# Patient Record
Sex: FEMALE | Race: WHITE | NOT HISPANIC OR LATINO | Employment: FULL TIME | URBAN - METROPOLITAN AREA
[De-identification: names, ages, dates, MRNs, and addresses within clinical notes are randomized per-mention and may not be internally consistent; named-entity substitution may affect disease eponyms.]

---

## 2017-01-03 ENCOUNTER — ANESTHESIA EVENT (OUTPATIENT)
Dept: PERIOP | Facility: HOSPITAL | Age: 32
End: 2017-01-03

## 2017-01-04 ENCOUNTER — ANESTHESIA (OUTPATIENT)
Dept: PERIOP | Facility: HOSPITAL | Age: 32
End: 2017-01-04

## 2017-01-04 PROBLEM — R10.2 PELVIC PAIN: Status: ACTIVE | Noted: 2017-01-04

## 2017-01-04 PROCEDURE — 25010000002 ONDANSETRON PER 1 MG: Performed by: NURSE ANESTHETIST, CERTIFIED REGISTERED

## 2017-01-04 PROCEDURE — 25010000002 FENTANYL CITRATE (PF) 100 MCG/2ML SOLUTION: Performed by: NURSE ANESTHETIST, CERTIFIED REGISTERED

## 2017-01-04 PROCEDURE — 25010000003 CEFAZOLIN IN DEXTROSE 2-4 GM/100ML-% SOLUTION: Performed by: OBSTETRICS & GYNECOLOGY

## 2017-01-04 PROCEDURE — 25010000002 PROPOFOL 10 MG/ML EMULSION: Performed by: NURSE ANESTHETIST, CERTIFIED REGISTERED

## 2017-01-04 PROCEDURE — 25010000002 KETOROLAC TROMETHAMINE PER 15 MG: Performed by: NURSE ANESTHETIST, CERTIFIED REGISTERED

## 2017-01-04 PROCEDURE — 25010000002 NEOSTIGMINE PER 0.5 MG: Performed by: NURSE ANESTHETIST, CERTIFIED REGISTERED

## 2017-01-04 PROCEDURE — 25010000002 PROPOFOL 1000 MG/ML EMULSION: Performed by: NURSE ANESTHETIST, CERTIFIED REGISTERED

## 2017-01-04 PROCEDURE — 25010000002 MIDAZOLAM PER 1 MG: Performed by: NURSE ANESTHETIST, CERTIFIED REGISTERED

## 2017-01-04 PROCEDURE — 25010000002 DEXAMETHASONE PER 1 MG: Performed by: NURSE ANESTHETIST, CERTIFIED REGISTERED

## 2017-01-04 RX ORDER — FENTANYL CITRATE 50 UG/ML
INJECTION, SOLUTION INTRAMUSCULAR; INTRAVENOUS AS NEEDED
Status: DISCONTINUED | OUTPATIENT
Start: 2017-01-04 | End: 2017-01-04 | Stop reason: SURG

## 2017-01-04 RX ORDER — ONDANSETRON 2 MG/ML
INJECTION INTRAMUSCULAR; INTRAVENOUS AS NEEDED
Status: DISCONTINUED | OUTPATIENT
Start: 2017-01-04 | End: 2017-01-04 | Stop reason: SURG

## 2017-01-04 RX ORDER — GLYCOPYRROLATE 0.2 MG/ML
INJECTION INTRAMUSCULAR; INTRAVENOUS AS NEEDED
Status: DISCONTINUED | OUTPATIENT
Start: 2017-01-04 | End: 2017-01-04 | Stop reason: SURG

## 2017-01-04 RX ORDER — MIDAZOLAM HYDROCHLORIDE 1 MG/ML
INJECTION INTRAMUSCULAR; INTRAVENOUS AS NEEDED
Status: DISCONTINUED | OUTPATIENT
Start: 2017-01-04 | End: 2017-01-04 | Stop reason: SURG

## 2017-01-04 RX ORDER — LIDOCAINE HYDROCHLORIDE 10 MG/ML
INJECTION, SOLUTION INFILTRATION; PERINEURAL AS NEEDED
Status: DISCONTINUED | OUTPATIENT
Start: 2017-01-04 | End: 2017-01-04 | Stop reason: SURG

## 2017-01-04 RX ORDER — ATRACURIUM BESYLATE 10 MG/ML
INJECTION, SOLUTION INTRAVENOUS AS NEEDED
Status: DISCONTINUED | OUTPATIENT
Start: 2017-01-04 | End: 2017-01-04 | Stop reason: SURG

## 2017-01-04 RX ORDER — DEXAMETHASONE SODIUM PHOSPHATE 4 MG/ML
INJECTION, SOLUTION INTRA-ARTICULAR; INTRALESIONAL; INTRAMUSCULAR; INTRAVENOUS; SOFT TISSUE AS NEEDED
Status: DISCONTINUED | OUTPATIENT
Start: 2017-01-04 | End: 2017-01-04 | Stop reason: SURG

## 2017-01-04 RX ORDER — PROPOFOL 10 MG/ML
VIAL (ML) INTRAVENOUS AS NEEDED
Status: DISCONTINUED | OUTPATIENT
Start: 2017-01-04 | End: 2017-01-04 | Stop reason: SURG

## 2017-01-04 RX ORDER — KETOROLAC TROMETHAMINE 30 MG/ML
INJECTION, SOLUTION INTRAMUSCULAR; INTRAVENOUS AS NEEDED
Status: DISCONTINUED | OUTPATIENT
Start: 2017-01-04 | End: 2017-01-04 | Stop reason: SURG

## 2017-01-04 RX ADMIN — ONDANSETRON 4 MG: 2 INJECTION INTRAMUSCULAR; INTRAVENOUS at 11:07

## 2017-01-04 RX ADMIN — LIDOCAINE HYDROCHLORIDE 50 MG: 10 INJECTION, SOLUTION INFILTRATION; PERINEURAL at 10:37

## 2017-01-04 RX ADMIN — DEXAMETHASONE SODIUM PHOSPHATE 8 MG: 4 INJECTION, SOLUTION INTRAMUSCULAR; INTRAVENOUS at 10:42

## 2017-01-04 RX ADMIN — PROPOFOL 200 MG: 10 INJECTION, EMULSION INTRAVENOUS at 10:37

## 2017-01-04 RX ADMIN — MIDAZOLAM HYDROCHLORIDE 2 MG: 1 INJECTION, SOLUTION INTRAMUSCULAR; INTRAVENOUS at 10:32

## 2017-01-04 RX ADMIN — FENTANYL CITRATE 50 MCG: 50 INJECTION, SOLUTION INTRAMUSCULAR; INTRAVENOUS at 10:37

## 2017-01-04 RX ADMIN — ROBINUL 0.2 MG: 0.2 INJECTION INTRAMUSCULAR; INTRAVENOUS at 10:58

## 2017-01-04 RX ADMIN — ATRACURIUM BESYLATE 30 MG: 10 INJECTION, SOLUTION INTRAVENOUS at 10:37

## 2017-01-04 RX ADMIN — CEFAZOLIN SODIUM 2 G: 2 INJECTION, SOLUTION INTRAVENOUS at 10:42

## 2017-01-04 RX ADMIN — FENTANYL CITRATE 50 MCG: 50 INJECTION, SOLUTION INTRAMUSCULAR; INTRAVENOUS at 10:55

## 2017-01-04 RX ADMIN — KETOROLAC TROMETHAMINE 30 MG: 30 INJECTION, SOLUTION INTRAMUSCULAR at 11:09

## 2017-01-04 RX ADMIN — PROPOFOL 25 MCG/KG/MIN: 10 INJECTION, EMULSION INTRAVENOUS at 10:41

## 2017-01-04 RX ADMIN — ROBINUL 0.4 MG: 0.2 INJECTION INTRAMUSCULAR; INTRAVENOUS at 11:13

## 2017-01-04 RX ADMIN — Medication 3 MG: at 11:13

## 2017-01-04 NOTE — ANESTHESIA PREPROCEDURE EVALUATION
Anesthesia Evaluation      Airway   Mallampati: I  TM distance: <3 FB  Neck ROM: full  no difficulty expected  Dental - normal exam     Pulmonary - normal exam   Cardiovascular - normal exam    Neuro/Psych  GI/Hepatic/Renal/Endo      Musculoskeletal     Abdominal  - normal exam    Bowel sounds: normal.   Substance History      OB/GYN          Other                       Anesthesia Plan    ASA 2     general

## 2017-01-04 NOTE — ANESTHESIA PROCEDURE NOTES
Airway  Urgency: elective    Airway not difficult    General Information and Staff    Patient location during procedure: OR  Anesthesiologist: ДМИТРИЙ KEY  CRNA: ROSY OLMOS    Indications and Patient Condition  Indications for airway management: airway protection    Preoxygenated: yes  MILS not maintained throughout  Mask difficulty assessment: 1 - vent by mask    Final Airway Details  Final airway type: endotracheal airway      Successful airway: ETT  Cuffed: yes   Successful intubation technique: direct laryngoscopy  Facilitating devices/methods: intubating stylet  Endotracheal tube insertion site: oral  Blade: Sandra  Blade size: #3  ETT size: 7.0 mm  Cormack-Lehane Classification: grade I - full view of glottis  Placement verified by: chest auscultation and capnometry   Cuff volume (mL): 6  Measured from: lips  ETT to lips (cm): 21  Number of attempts at approach: 1    Additional Comments  Negative epigastric sounds, Breath sound equal bilaterally with symmetric chest rise and fall

## 2017-01-04 NOTE — ANESTHESIA POSTPROCEDURE EVALUATION
Patient: Andreina Segovia    Procedure Summary     Date Anesthesia Start Anesthesia Stop Room / Location    01/04/17 1031   [facility] / [facility]       Procedure Diagnosis Surgeon Provider    DIAGNOSTIC LAPAROSCOPY, LAP BILATERAL SALPINGECTOMY (N/A Abdomen) No diagnosis on file. MD Josue Hooks MD          Anesthesia Type: general  Last vitals  BP   130/79   Temp   97.8   Pulse  89   Resp   15   SpO2   97     Post Anesthesia Care and Evaluation    Patient location during evaluation: PACU  Patient participation: complete - patient participated  Level of consciousness: awake and alert  Pain management: adequate  Airway patency: patent  Anesthetic complications: no  Cardiovascular status: hemodynamically stable and acceptable  Respiratory status: nonlabored ventilation, acceptable and nasal cannula  Hydration status: acceptable

## 2017-01-15 ENCOUNTER — APPOINTMENT (OUTPATIENT)
Dept: CT IMAGING | Facility: HOSPITAL | Age: 32
End: 2017-01-15

## 2017-01-15 ENCOUNTER — HOSPITAL ENCOUNTER (EMERGENCY)
Facility: HOSPITAL | Age: 32
Discharge: HOME | End: 2017-01-15
Attending: EMERGENCY MEDICINE | Admitting: EMERGENCY MEDICINE

## 2017-01-15 ENCOUNTER — APPOINTMENT (OUTPATIENT)
Dept: GENERAL RADIOLOGY | Facility: HOSPITAL | Age: 32
End: 2017-01-15

## 2017-01-15 VITALS
OXYGEN SATURATION: 95 % | DIASTOLIC BLOOD PRESSURE: 103 MMHG | SYSTOLIC BLOOD PRESSURE: 149 MMHG | HEIGHT: 60 IN | HEART RATE: 85 BPM | RESPIRATION RATE: 18 BRPM | BODY MASS INDEX: 42.41 KG/M2 | WEIGHT: 216 LBS | TEMPERATURE: 98.5 F

## 2017-01-15 DIAGNOSIS — K44.9 HIATAL HERNIA: ICD-10-CM

## 2017-01-15 DIAGNOSIS — R06.00 DYSPNEA, UNSPECIFIED TYPE: ICD-10-CM

## 2017-01-15 DIAGNOSIS — R07.9 CHEST PAIN, UNSPECIFIED TYPE: Primary | ICD-10-CM

## 2017-01-15 LAB
ALBUMIN SERPL-MCNC: 4.3 G/DL (ref 3.2–4.8)
ALBUMIN/GLOB SERPL: 1.3 G/DL (ref 1.5–2.5)
ALP SERPL-CCNC: 114 U/L (ref 25–100)
ALT SERPL W P-5'-P-CCNC: 20 U/L (ref 7–40)
ANION GAP SERPL CALCULATED.3IONS-SCNC: 14 MMOL/L (ref 3–11)
AST SERPL-CCNC: 20 U/L (ref 0–33)
BASOPHILS # BLD AUTO: 0.02 10*3/MM3 (ref 0–0.2)
BASOPHILS NFR BLD AUTO: 0.2 % (ref 0–1)
BILIRUB SERPL-MCNC: 0.2 MG/DL (ref 0.3–1.2)
BUN BLD-MCNC: 9 MG/DL (ref 9–23)
BUN/CREAT SERPL: 12.9 (ref 7–25)
CALCIUM SPEC-SCNC: 10.1 MG/DL (ref 8.7–10.4)
CHLORIDE SERPL-SCNC: 107 MMOL/L (ref 99–109)
CO2 SERPL-SCNC: 25 MMOL/L (ref 20–31)
CREAT BLD-MCNC: 0.7 MG/DL (ref 0.6–1.3)
D DIMER PPP FEU-MCNC: 0.86 MG/L (FEU) (ref 0–0.5)
DEPRECATED RDW RBC AUTO: 44.5 FL (ref 37–54)
EOSINOPHIL # BLD AUTO: 0.16 10*3/MM3 (ref 0.1–0.3)
EOSINOPHIL NFR BLD AUTO: 1.9 % (ref 0–3)
ERYTHROCYTE [DISTWIDTH] IN BLOOD BY AUTOMATED COUNT: 13.5 % (ref 11.3–14.5)
GFR SERPL CREATININE-BSD FRML MDRD: 96 ML/MIN/1.73
GLOBULIN UR ELPH-MCNC: 3.4 GM/DL
GLUCOSE BLD-MCNC: 105 MG/DL (ref 70–100)
HCT VFR BLD AUTO: 38.8 % (ref 34.5–44)
HGB BLD-MCNC: 12.8 G/DL (ref 11.5–15.5)
HOLD SPECIMEN: NORMAL
IMM GRANULOCYTES # BLD: 0.03 10*3/MM3 (ref 0–0.03)
IMM GRANULOCYTES NFR BLD: 0.4 % (ref 0–0.6)
INR PPP: 0.92
LYMPHOCYTES # BLD AUTO: 1.98 10*3/MM3 (ref 0.6–4.8)
LYMPHOCYTES NFR BLD AUTO: 23.5 % (ref 24–44)
MCH RBC QN AUTO: 29.6 PG (ref 27–31)
MCHC RBC AUTO-ENTMCNC: 33 G/DL (ref 32–36)
MCV RBC AUTO: 89.6 FL (ref 80–99)
MONOCYTES # BLD AUTO: 0.39 10*3/MM3 (ref 0–1)
MONOCYTES NFR BLD AUTO: 4.6 % (ref 0–12)
NEUTROPHILS # BLD AUTO: 5.83 10*3/MM3 (ref 1.5–8.3)
NEUTROPHILS NFR BLD AUTO: 69.4 % (ref 41–71)
PLATELET # BLD AUTO: 247 10*3/MM3 (ref 150–450)
PMV BLD AUTO: 10.2 FL (ref 6–12)
POTASSIUM BLD-SCNC: 4.1 MMOL/L (ref 3.5–5.5)
PROT SERPL-MCNC: 7.7 G/DL (ref 5.7–8.2)
PROTHROMBIN TIME: 10 SECONDS (ref 9.6–11.5)
RBC # BLD AUTO: 4.33 10*6/MM3 (ref 3.89–5.14)
SODIUM BLD-SCNC: 146 MMOL/L (ref 132–146)
TROPONIN I SERPL-MCNC: 0.01 NG/ML (ref 0–0.07)
TROPONIN I SERPL-MCNC: <0.006 NG/ML
WBC NRBC COR # BLD: 8.41 10*3/MM3 (ref 3.5–10.8)
WHOLE BLOOD HOLD SPECIMEN: NORMAL
WHOLE BLOOD HOLD SPECIMEN: NORMAL

## 2017-01-15 PROCEDURE — 85025 COMPLETE CBC W/AUTO DIFF WBC: CPT | Performed by: PHYSICIAN ASSISTANT

## 2017-01-15 PROCEDURE — 25010000002 KETOROLAC TROMETHAMINE PER 15 MG: Performed by: PHYSICIAN ASSISTANT

## 2017-01-15 PROCEDURE — 85610 PROTHROMBIN TIME: CPT | Performed by: PHYSICIAN ASSISTANT

## 2017-01-15 PROCEDURE — 99284 EMERGENCY DEPT VISIT MOD MDM: CPT

## 2017-01-15 PROCEDURE — 84484 ASSAY OF TROPONIN QUANT: CPT

## 2017-01-15 PROCEDURE — 93005 ELECTROCARDIOGRAM TRACING: CPT | Performed by: EMERGENCY MEDICINE

## 2017-01-15 PROCEDURE — 85379 FIBRIN DEGRADATION QUANT: CPT | Performed by: PHYSICIAN ASSISTANT

## 2017-01-15 PROCEDURE — 71010 HC CHEST PA OR AP: CPT

## 2017-01-15 PROCEDURE — 93005 ELECTROCARDIOGRAM TRACING: CPT | Performed by: PHYSICIAN ASSISTANT

## 2017-01-15 PROCEDURE — 96374 THER/PROPH/DIAG INJ IV PUSH: CPT

## 2017-01-15 PROCEDURE — 84484 ASSAY OF TROPONIN QUANT: CPT | Performed by: EMERGENCY MEDICINE

## 2017-01-15 PROCEDURE — 80053 COMPREHEN METABOLIC PANEL: CPT | Performed by: PHYSICIAN ASSISTANT

## 2017-01-15 PROCEDURE — 0 IOPAMIDOL PER 1 ML: Performed by: EMERGENCY MEDICINE

## 2017-01-15 PROCEDURE — 71275 CT ANGIOGRAPHY CHEST: CPT

## 2017-01-15 RX ORDER — KETOROLAC TROMETHAMINE 30 MG/ML
30 INJECTION, SOLUTION INTRAMUSCULAR; INTRAVENOUS ONCE
Status: COMPLETED | OUTPATIENT
Start: 2017-01-15 | End: 2017-01-15

## 2017-01-15 RX ORDER — SODIUM CHLORIDE 0.9 % (FLUSH) 0.9 %
10 SYRINGE (ML) INJECTION AS NEEDED
Status: DISCONTINUED | OUTPATIENT
Start: 2017-01-15 | End: 2017-01-15 | Stop reason: HOSPADM

## 2017-01-15 RX ADMIN — IOPAMIDOL 60 ML: 755 INJECTION, SOLUTION INTRAVENOUS at 10:41

## 2017-01-15 RX ADMIN — KETOROLAC TROMETHAMINE 30 MG: 30 INJECTION, SOLUTION INTRAMUSCULAR at 12:01

## 2017-01-15 NOTE — ED PROVIDER NOTES
Subjective   Patient is a 31 y.o. female presenting with chest pain.   History provided by:  Patient  Chest Pain   Pain location:  R chest  Pain quality: pressure and sharp    Pain radiates to:  Does not radiate  Onset quality:  Sudden  Duration: About 4am today.  Context comment:  Pt had diagnostic laparoscopy and salpingectomy by Dr Allison 1-4-17. No complications since procedure.   Ineffective treatments: Motrin.  Associated symptoms: dizziness (Lightheaded ) and shortness of breath    Associated symptoms: no abdominal pain, no anxiety, no back pain, no cough, no dysphagia, no fever, no headache, no lower extremity edema, no nausea, no numbness, no palpitations, no syncope, no vomiting and no weakness    Risk factors: birth control (Discontinued 1-4-17) and surgery    Risk factors: no coronary artery disease, no diabetes mellitus, no high cholesterol, no hypertension, no immobilization, no prior DVT/PE and no smoking        Review of Systems   Constitutional: Negative for chills and fever.   HENT: Negative for congestion, ear pain, sore throat and trouble swallowing.    Eyes: Negative for pain, redness and visual disturbance.   Respiratory: Positive for shortness of breath. Negative for cough and chest tightness.    Cardiovascular: Positive for chest pain. Negative for palpitations, leg swelling and syncope.   Gastrointestinal: Negative for abdominal pain, constipation, diarrhea, nausea and vomiting.   Genitourinary: Negative for difficulty urinating, dysuria, flank pain, hematuria and vaginal bleeding.   Musculoskeletal: Negative for arthralgias, back pain and joint swelling.   Skin: Negative for rash and wound.   Neurological: Positive for dizziness (Lightheaded ). Negative for syncope, speech difficulty, weakness, numbness and headaches.   Psychiatric/Behavioral: Negative for confusion.   All other systems reviewed and are negative.      Past Medical History   Diagnosis Date   • Bronchitis    • Infectious  mononucleosis    • Ovarian cyst    • Wears glasses        No Known Allergies    Past Surgical History   Procedure Laterality Date   • [place] tooth extraction     • Colonoscopy     • Diagnostic laparoscopy N/A 1/4/2017     Procedure: DIAGNOSTIC LAPAROSCOPY, LAP BILATERAL SALPINGECTOMY;  Surgeon: Kaia Humphrey MD;  Location: [facility], 1928–1983;  Service:        Family History   Problem Relation Age of Onset   • Ulcerative colitis Mother    • Glaucoma Father        Social History     Social History   • Marital status:      Spouse name: N/A   • Number of children: N/A   • Years of education: N/A     Social History Main Topics   • Smoking status: Never Smoker   • Smokeless tobacco: Never Used   • Alcohol use Yes      Comment: occasional    • Drug use: No   • Sexual activity: No     Other Topics Concern   • None     Social History Narrative           Objective   Physical Exam   Constitutional: She is oriented to person, place, and time. Vital signs are normal. She appears well-developed.   HENT:   Head: Atraumatic.   Nose: Nose normal.   Mouth/Throat: Mucous membranes are normal.   Eyes: Conjunctivae, EOM and lids are normal. Pupils are equal, round, and reactive to light.   Neck: Normal range of motion. Neck supple.   Cardiovascular: Regular rhythm and normal heart sounds.  Tachycardia present.    Pulmonary/Chest: Effort normal and breath sounds normal. She has no wheezes.   Abdominal: Soft. She exhibits no distension. There is no tenderness. There is no rebound and no guarding.   Well healing surgical scars. No erythema or drainage.    Musculoskeletal: Normal range of motion. She exhibits no edema or tenderness.   Neurological: She is alert and oriented to person, place, and time. No sensory deficit.   Skin: Skin is warm and dry. No rash noted. No erythema.   Psychiatric: She has a normal mood and affect. Her speech is normal and behavior is normal.   Nursing note and vitals reviewed.      Procedures         ED  Course  ED Course   Comment By Time   RE-examined multiple times in ED. Pt initially declined pain meds but then agreeable for Toradol. Discussed results and close f/u with PCP / Dr Humphrey. She will return to ED if new or worse sx.  MONTSE Brewster 01/15 1153        Course of Care      Lab Results (last 24 hours)     Procedure Component Value Units Date/Time    CBC & Differential [[id number]] Collected:  01/15/17 0913    Specimen:  Blood Updated:  01/15/17 0932    Narrative:       The following orders were created for panel order CBC & Differential.  Procedure                               Abnormality         Status                     ---------                               -----------         ------                     CBC Auto Differential[[id number]]         Abnormal            Final result                 Please view results for these tests on the individual orders.    Comprehensive Metabolic Panel [[id number]]  (Abnormal) Collected:  01/15/17 0913    Specimen:  Blood Updated:  01/15/17 0949     Glucose 105 (H) mg/dL      BUN 9 mg/dL      Creatinine 0.70 mg/dL      Sodium 146 mmol/L      Potassium 4.1 mmol/L      Chloride 107 mmol/L      CO2 25.0 mmol/L      Calcium 10.1 mg/dL      Total Protein 7.7 g/dL      Albumin 4.30 g/dL      ALT (SGPT) 20 U/L      AST (SGOT) 20 U/L      Alkaline Phosphatase 114 (H) U/L      Total Bilirubin 0.2 (L) mg/dL      eGFR Non African Amer 96 mL/min/1.73      Globulin 3.4 gm/dL      A/G Ratio 1.3 (L) g/dL      BUN/Creatinine Ratio 12.9      Anion Gap 14.0 (H) mmol/L     Narrative:       [employer] Guidelines    Stage                           Description                             GFR                      1                               Normal or High                          90+  2                               Mild decrease                            60-89  3                               Moderate decrease                   30-59  4                                Severe decrease                       15-29  5                               Kidney failure                             <15    Protime-INR [[id number]] Collected:  01/15/17 0913    Specimen:  Blood Updated:  01/15/17 0950     Protime 10.0 Seconds      INR 0.92     Narrative:       Therapeutic Ranges for INR: 2.0-3.0 (PT 20-30)                              2.5-3.5 (PT 25-34)    D-dimer, Quantitative [[id number]]  (Abnormal) Collected:  01/15/17 0913    Specimen:  Blood Updated:  01/15/17 0950     D-Dimer, Quantitative 0.86 (H) mg/L (FEU)     Narrative:       Negative predictive value for exclusion of venous thromboembolism: < or = 0.5 mg/L (FEU)    CBC Auto Differential [[id number]]  (Abnormal) Collected:  01/15/17 0913    Specimen:  Blood Updated:  01/15/17 0932     WBC 8.41 10*3/mm3      RBC 4.33 10*6/mm3      Hemoglobin 12.8 g/dL      Hematocrit 38.8 %      MCV 89.6 fL      MCH 29.6 pg      MCHC 33.0 g/dL      RDW 13.5 %      RDW-SD 44.5 fl      MPV 10.2 fL      Platelets 247 10*3/mm3      Neutrophil % 69.4 %      Lymphocyte % 23.5 (L) %      Monocyte % 4.6 %      Eosinophil % 1.9 %      Basophil % 0.2 %      Immature Grans % 0.4 %      Neutrophils, Absolute 5.83 10*3/mm3      Lymphocytes, Absolute 1.98 10*3/mm3      Monocytes, Absolute 0.39 10*3/mm3      Eosinophils, Absolute 0.16 10*3/mm3      Basophils, Absolute 0.02 10*3/mm3      Immature Grans, Absolute 0.03 10*3/mm3     Troponin [[id number]]  (Normal) Collected:  01/15/17 0913    Specimen:  Blood Updated:  01/15/17 1149     Troponin I <0.006 ng/mL     Narrative:       Ultra Troponin I Reference Range:         <=0.039 ng/mL: Negative    0.04-0.779 ng/mL: Indeterminate Range. Suspicious of MI.  Clinical correlation required.       >=0.78  ng/mL: Consistent with myocardial injury.  Clinical correlation required.    POC Troponin, Rapid [[id number]]  (Normal) Collected:  01/15/17 1122    Specimen:  Blood Updated:  01/15/17 1140     Troponin I 0.01 ng/mL       Serial  "Number: [id number]    : [id number]             Note: In addition to lab results from this visit, the labs listed above may include labs taken at another facility or during a different encounter within the last 24 hours. Please correlate lab times with ED admission and discharge times for further clarification of the services performed during this visit.    CT Angiogram Chest With Contrast   ED Interpretation   No PE, hiatal hernia otherwise NAD per RAD voice comment      XR Chest 1 View    (Results Pending)       Vitals:    01/15/17 0812 01/15/17 0814 01/15/17 0830 01/15/17 1100   BP:  157/93 131/87 (!) 149/103   BP Location:  Right arm     Patient Position:  Sitting     Pulse:  104 87 85   Resp:  18     Temp:  98.5 °F (36.9 °C)     TempSrc:  Oral     SpO2:  96% 95% 95%   Weight: 216 lb (98 kg)      Height: 60\" (152.4 cm)          Medications   sodium chloride 0.9 % flush 10 mL (not administered)   ketorolac (TORADOL) injection 30 mg (not administered)   iopamidol (ISOVUE-370) 76 % injection 60 mL (60 mL Intravenous Given 1/15/17 1041)       ECG/EMG Results (last 24 hours)     Procedure Component Value Units Date/Time    ECG 12 Lead [[id number]] Collected:  01/15/17 0816     Updated:  01/15/17 0839    ECG 12 Lead [[id number]] Collected:  01/15/17 1125     Updated:  01/15/17 1138                    MDM    Final diagnoses:   Chest pain, unspecified type   Dyspnea, unspecified type   Hiatal hernia            MONTSE Brewster  01/15/17 1810    "

## 2017-05-17 ENCOUNTER — OFFICE VISIT (OUTPATIENT)
Dept: ORTHOPEDIC SURGERY | Facility: CLINIC | Age: 32
End: 2017-05-17

## 2017-05-17 DIAGNOSIS — R52 PAIN: Primary | ICD-10-CM

## 2017-05-17 DIAGNOSIS — M79.672 FOOT PAIN, LEFT: ICD-10-CM

## 2017-05-17 DIAGNOSIS — M77.42 METATARSALGIA OF LEFT FOOT: ICD-10-CM

## 2017-05-17 PROCEDURE — 99213 OFFICE O/P EST LOW 20 MIN: CPT | Performed by: ORTHOPAEDIC SURGERY

## 2017-10-31 ENCOUNTER — TRANSCRIBE ORDERS (OUTPATIENT)
Dept: PHYSICAL THERAPY | Facility: HOSPITAL | Age: 32
End: 2017-10-31

## 2017-10-31 DIAGNOSIS — S49.91XA INJURY OF RIGHT SHOULDER, INITIAL ENCOUNTER: Primary | ICD-10-CM

## 2017-11-06 ENCOUNTER — HOSPITAL ENCOUNTER (OUTPATIENT)
Dept: PHYSICAL THERAPY | Facility: HOSPITAL | Age: 32
Setting detail: THERAPIES SERIES
Discharge: HOME | End: 2017-11-06

## 2017-11-06 DIAGNOSIS — S49.91XA INJURY OF RIGHT SHOULDER, INITIAL ENCOUNTER: Primary | ICD-10-CM

## 2017-11-06 PROCEDURE — 97161 PT EVAL LOW COMPLEX 20 MIN: CPT | Performed by: PHYSICAL THERAPIST

## 2017-11-06 NOTE — THERAPY EVALUATION
Outpatient Physical Therapy Ortho Initial Evaluation  [facility]     Patient Name: Andreina Segovia  : 1985  MRN: [id number]  Today's Date: 2017      Visit Date: 2017    Patient Active Problem List   Diagnosis   • Pelvic pain   • Metatarsalgia of left foot        Past Medical History:   Diagnosis Date   • Bronchitis    • Infectious mononucleosis    • Ovarian cyst    • Wears glasses         Past Surgical History:   Procedure Laterality Date   • COLONOSCOPY     • DIAGNOSTIC LAPAROSCOPY N/A 2017    Procedure: DIAGNOSTIC LAPAROSCOPY, LAP BILATERAL SALPINGECTOMY;  Surgeon: Kaia Humphrey MD;  Location: [facility];  Service:    • WISDOM TOOTH EXTRACTION         Visit Dx:     ICD-10-CM ICD-9-CM   1. Injury of right shoulder, initial encounter S49.91XA 959.2             Patient History       17 1400          History    Chief Complaint Difficulty with daily activities;Pain;Muscle weakness  -RB      Type of Pain Upper Extremity / Arm;Shoulder pain  -RB      Brief Description of Current Complaint Received a flu shot 3 weeks ago, was sore for a day or so.  By the end of the week was unable to raise her arm,  objects, has numbness/tingling in the pinky finger and ulnar side of the hand. Symptoms initially constant, have since become more intermittent. Pn initially located over the lateral upper arm, and radiated up into her neck and the shoulder blade, but recently has involved primarily the upper arm. Unable to raise her arm over shoulder height, don/doff tighter fitting shirts, wash her hair,  objects, open screw top lids, or perform repetitive activity w/ the R arm. Has been taking prednisone, ibuprofen. Has had no imaging.   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 Hobbies include knitting, running. She cares for her 3 children  -RB      Patient seeing anyone else for problem(s)? Yes  -RB      How has patient tried to help current problem? use of ice, heat, rest, medication  -RB      What clinical tests have you had for this problem? --   None  -RB      Pain     Pain Location Arm  -RB      Pain at Present 3  -RB      Pain at Best 3  -RB      Pain at Worst 7  -RB      Pain Frequency Constant/continuous  -RB      Pain Description Aching;Dull  -RB      What Performance Factors Make the Current Problem(s) WORSE? raising the arm, repetitive activity  -RB      What Performance Factors Make the Current Problem(s) BETTER? use of ibuprofen  -RB      Is your sleep disturbed? Yes   2/2 soreness w/ direct pressure  -RB      Fall Risk Assessment    Any falls in the past year: No  -RB      Daily Activities    Primary Language English  -RB      Are you able to read Yes  -RB      Are you able to write Yes  -RB      How does patient learn best? Demonstration  -RB      Teaching needs identified Home Exercise Program;Management of Condition  -RB      Patient is concerned about/has problems with Difficulty with self care (i.e. bathing, dressing, toileting:;Performing home management (household chores, shopping, care of dependents);Performing job responsibilities/community activities (work, school,;Reaching over head;Repetitive movements of the hand, arm, shoulder  -RB      Does patient have problems with the following? None  -RB      Barriers to learning None  -RB      Pt Participated in POC and Goals Yes  -RB      Safety    Are you being hurt, hit, or frightened by anyone at home or in your life? No  -RB      Are you being neglected by a caregiver No  -RB        User Key  (r) = Recorded By, (t) = Taken By, (c) =  Cosigned By    Initials Name Provider Type    RB Collette Crespo, PT Physical Therapist                PT Ortho       11/06/17 1400    Subjective Comments    Subjective Comments Presents w/ complaint of acute right shoulder pn after receiving flu shot 3 weeks ago.  -RB    Posture/Observations    Posture- WNL Posture is WNL  -RB    Quarter Clearing    Quarter Clearing Upper Quarter Clearing  -RB    Neural Tension Signs- Upper Quarter Clearing    ULNTT 1 Negative  -RB    ULNTT 2 Negative  -RB    ULNTT 3 Negative  -RB    ULNTT 4 Negative  -RB    Sensory Screen for Light Touch- Upper Quarter Clearing    C4 (posterior shoulder) Intact  -RB    C5 (lateral upper arm) Intact  -RB    C6 (tip of thumb) Intact  -RB    C7 (tip of 3rd finger) Intact  -RB    C8 (tip of 5th finger) Intact  -RB    T1 (medial lower arm) Intact  -RB    Cervical/Shoulder ROM Screen    Cervical flexion Normal  -RB    Cervical extension Normal  -RB    Cervical lateral flexion Normal  -RB    Cervical rotation Normal  -RB    Cervical quadrant (Spurling's) Normal  -RB    Special Tests/Palpation    Special Tests/Palpation Shoulder  -RB    Shoulder Girdle Palpation    Subacromial Space Right:;Tender  -RB    Long Head of Biceps Right:;Tender  -RB    Deltoid Right:;Tender   ant/middle portions  -RB    Shoulder Girdle Palpation? Yes  -RB    Shoulder Girdle Accessory Motions    Shoulder Girdle Accessory Motions Tested? Yes   intact and pn free  -RB    Shoulder Impingement/Rotator Cuff Special Tests    Ordoñez-Theron Test (RC Lesion vs. Bursitis) Negative  -RB    Neer Impingement Test (RC Lesion vs. Bursitis) Negative  -RB    Empty Can Test (RC Lesion) Right:;Positive   weak and painful  -RB    Belly Press (Subscapularis Lesion) Negative  -RB    Speed's Test (LH of Biceps Lesion) Right:;Positive   pn over bicep tendon  -RB    Yergason Test (LH of Biceps Lesion) Right:;Positive  -RB    Biceps/Labral Special Tests    Sumter's Test (Labral Test) Negative  -RB     ROM (Range of Motion)    General ROM upper extremity range of motion deficits identified  -RB    Right Shoulder    Flexion AROM Deficit 143   limited by tightness on top of shoulder  -RB    Flexion PROM Deficit 180    pn anterior shoulder at end range  -RB    ABduction AROM Deficit 93  -RB    ABduction PROM Deficit 170   pn at end range  -RB    External Rotation AROM Deficit 56  -RB    External Rotation PROM Deficit 70  -RB    Internal Rotation AROM Deficit to mid back, limited by pn/tightness upper arm  -RB    Internal Rotation PROM Deficit WFL  -RB    General UE Assessment    ROM shoulder, right: UE ROM deficit  -RB    MMT (Manual Muscle Testing)    General MMT Assessment upper extremity strength deficits identified  -RB    Right Shoulder    Flexion Gross Movement (4-/5) good minus  -RB    ABduction Gross Movement (4-/5) good minus   significant pn  -RB    Int Rotation Gross Movement (4+/5) good plus  -RB    Ext Rotation Gross Movement (4/5) good  -RB    Upper Extremity    Upper Ext Manual Muscle Testing right shoulder strength deficit;right elbow/forearm strength deficit  -RB    Right Elbow/Forearm    Elbow Flexion Gross Movement (4+/5) good plus   mild pn  -RB    Elbow Extension Gross Movement (5/5) normal  -RB      User Key  (r) = Recorded By, (t) = Taken By, (c) = Cosigned By    Initials Name Provider Type    COLETTE Crespo PT Physical Therapist                            Therapy Education       11/06/17 1527          Therapy Education    Education Details Issued hands behind head ER stretch, bicep stretch, and wall slides  -RB      Given HEP  -RB      Program New  -RB      How Provided Verbal;Demonstration;Written  -RB      Provided to Patient  -RB      Level of Understanding Teach back education performed  -RB        User Key  (r) = Recorded By, (t) = Taken By, (c) = Cosigned By    Initials Name Provider Type    COLETTE Crespo PT Physical Therapist                PT OP Goals       11/06/17 1500        PT Short Term Goals    STG Date to Achieve 11/27/17  -RB     STG 1 Pt to be compliant w/ initial HEP for flexibility and strengthening  -RB     STG 1 Progress New  -RB     STG 2 Pt to demo full AROM of the R shoulder w/ minimal to no pn.  -RB     STG 2 Progress New  -RB     STG 3 Pt to report pn w/ daily activities no greater than 4/10.  -RB     STG 3 Progress New  -RB     Long Term Goals    LTG Date to Achieve 12/18/17  -RB     LTG 1 Pt to be independent w/ long term HEP for strengthening, flexibility, symptom mgmt  -RB     LTG 1 Progress New  -RB     LTG 2 Pt to improve R shoulder/elbow strength to at least 4+/5 in all planes w/ minimal to no pn upon testing.  -RB     LTG 2 Progress New  -RB     LTG 3 Pt to improve QD score by at least 20% to reflect improved pn and function.  -RB     LTG 3 Progress New  -RB     Time Calculation    PT Goal Re-Cert Due Date 02/04/18  -RB       User Key  (r) = Recorded By, (t) = Taken By, (c) = Cosigned By    Initials Name Provider Type    RB Collette Crespo, PT Physical Therapist                PT Assessment/Plan       11/06/17 1530       PT Assessment    Functional Limitations Limitations in functional capacity and performance;Performance in self-care ADL;Limitation in home management;Performance in work activities  -RB     Impairments Muscle strength;Pain;Range of motion;Impaired flexibility  -RB     Assessment Comments Pt presents with complaint of acute R upper extremity pn, numbness/tingling along ulnar hand and 5th digit. Findings indicative of R bicep strain. She demonstrates deficits in R shoulder AROM and strength, limiting her ability to perform daily and work activities including pushing/pulling, lifting and carrying objects, opening lids. Cervical compression/distraction and ULTT unremarkable. Pt would benefit from skilled PT services to address deficits, decrease pn, and allow return to PLOF.  -RB     Please refer to paper survey for additional self-reported information  Yes  -RB     Rehab Potential Good  -RB     Patient/caregiver participated in establishment of treatment plan and goals Yes  -RB     Patient would benefit from skilled therapy intervention Yes  -RB     PT Plan    PT Frequency 1x/week  -RB     Predicted Duration of Therapy Intervention (days/wks) 6 wks  -RB     Planned CPT's? PT EVAL LOW COMPLEXITY: 52970;PT RE-EVAL: 56717;PT THER PROC EA 15 MIN: 36415;PT MANUAL THERAPY EA 15 MIN: 19253;PT NEUROMUSC RE-EDUCATION EA 15 MIN: 97648;PT ELECTRICAL STIM UNATTEND: ;PT HOT/COLD PACK WC NONMCARE: 87872;PT ULTRASOUND EA 15 MIN: 96603;PT IONTOPHORESIS EA 15 MIN: 38899  -RB     PT Plan Comments PT POC to include progressive strengthening and flexibility program, manual therapy techniques, modalities as indicated for pn control.  -RB       User Key  (r) = Recorded By, (t) = Taken By, (c) = Cosigned By    Initials Name Provider Type    COLETTE Crespo, PT Physical Therapist                  Exercises       11/06/17 1400          Subjective Comments    Subjective Comments Presents w/ complaint of acute right shoulder pn after receiving flu shot 3 weeks ago.  -RB        User Key  (r) = Recorded By, (t) = Taken By, (c) = Cosigned By    Initials Name Provider Type    COLETTE Crespo, PT Physical Therapist                              Outcome Measures       11/06/17 1500          Quick DASH    Open a tight or new jar. 4  -RB      Do heavy household chores (e.g., wash walls, wash floors) 4  -RB      Carry a shopping bag or briefcase 3  -RB      Wash your back 4  -RB      Use a knife to cut food 1  -RB      Recreational activities in which you take some force or impact through your arm, should or hand (e.g. golf, hammering, tennis, etc.) 5  -RB      During the past week, to what extent has your arm, shoulder, or hand problem interfered with your normal social activites with family, friends, neighbors or groups? 4  -RB      During the past week, were you limited in your work or  other regular daily activities as a result of your arm, shoulder or hand problem? 4  -RB      Arm, Shoulder, or hand pain 3  -RB      Tingling (pins and needles) in your arm, shoulder, or hand 3  -RB      During the past week, how much difficulty have you had sleeping because of the pain in your arm, shoulder or hand? 2  -RB      Number of Questions Answered 11  -RB      Quick DASH Score 59.09  -RB      Functional Assessment    Outcome Measure Options Quick DASH  -RB        User Key  (r) = Recorded By, (t) = Taken By, (c) = Cosigned By    Initials Name Provider Type    RB Collette Crespo, PT Physical Therapist            Time Calculation:   Start Time: 1430     Therapy Charges for Today     Code Description Service Date Service Provider Modifiers Qty    62005413464 HC PT EVAL LOW COMPLEXITY 3 11/6/2017 Collette Crespo, PT GP 1          PT G-Sophia  Outcome Measure Options: Quick DASH         Collette Crespo, PT  11/6/2017

## 2017-11-14 ENCOUNTER — HOSPITAL ENCOUNTER (OUTPATIENT)
Dept: PHYSICAL THERAPY | Facility: HOSPITAL | Age: 32
Setting detail: THERAPIES SERIES
Discharge: HOME | End: 2017-11-14

## 2017-11-14 DIAGNOSIS — S49.91XA INJURY OF RIGHT SHOULDER, INITIAL ENCOUNTER: Primary | ICD-10-CM

## 2017-11-14 PROCEDURE — 97140 MANUAL THERAPY 1/> REGIONS: CPT | Performed by: PHYSICAL THERAPIST

## 2017-11-14 PROCEDURE — 97110 THERAPEUTIC EXERCISES: CPT | Performed by: PHYSICAL THERAPIST

## 2017-11-14 PROCEDURE — 97035 APP MDLTY 1+ULTRASOUND EA 15: CPT | Performed by: PHYSICAL THERAPIST

## 2017-11-14 NOTE — THERAPY TREATMENT NOTE
Outpatient Physical Therapy Ortho Treatment Note  [facility]     Patient Name: Andreina Segovia  : 1985  MRN: [id number]  Today's Date: 2017      Visit Date: 2017    Visit Dx:    ICD-10-CM ICD-9-CM   1. Injury of right shoulder, initial encounter S49.91XA 959.2       Patient Active Problem List   Diagnosis   • Pelvic pain   • Metatarsalgia of left foot        Past Medical History:   Diagnosis Date   • Bronchitis    • Infectious mononucleosis    • Ovarian cyst    • Wears glasses         Past Surgical History:   Procedure Laterality Date   • COLONOSCOPY     • DIAGNOSTIC LAPAROSCOPY N/A 2017    Procedure: DIAGNOSTIC LAPAROSCOPY, LAP BILATERAL SALPINGECTOMY;  Surgeon: Kaia Humphrey MD;  Location: [facility];  Service:    • WISDOM TOOTH EXTRACTION               PT Ortho       17 1600    Subjective Comments    Subjective Comments Pn in the upper arm is about the same, however tingling in hand/digits much improved.  -RB    Subjective Pain    Able to rate subjective pain? yes  -RB    Pre-Treatment Pain Level 3  -RB    Post-Treatment Pain Level 4  -RB      User Key  (r) = Recorded By, (t) = Taken By, (c) = Cosigned By    Initials Name Provider Type    COLETTE Crespo, PT Physical Therapist                            PT Assessment/Plan       17 1632       PT Assessment    Assessment Comments Signs/symptoms appear more indicative of bursitis vs. bicep involvement. Pt tolerated initial ther ex fairly well, though has significant pn w/ elevation over shoulder height.  -RB     PT Plan    PT Plan Comments Cont per POC-progress strengthening as tolerated.  -RB       User Key  (r) = Recorded By, (t) = Taken By, (c) = Cosigned By    Initials Name Provider Type    COLETTE Crespo, PT Physical Therapist                Modalities       17 1600          Ultrasound 70703    Location R shoulder-anterior  -RB      Rx Minutes 8  -RB      Duty Cycle 50  -RB      Frequency 1.0 MHz   -RB      Intensity - Wts/cm 1  -RB        User Key  (r) = Recorded By, (t) = Taken By, (c) = Cosigned By    Initials Name Provider Type    COLETTE Crespo, PT Physical Therapist                Exercises       11/14/17 1600          Subjective Comments    Subjective Comments Pn in the upper arm is about the same, however tingling in hand/digits much improved.  -RB      Subjective Pain    Able to rate subjective pain? yes  -RB      Pre-Treatment Pain Level 3  -RB      Post-Treatment Pain Level 4  -RB      Exercise 1    Exercise Name 1 Initiated ther ex in clinic as per flow sheet w/ focus on UE flexiblity and scapular stabilization.  -RB      Time (Minutes) 1 20  -RB        User Key  (r) = Recorded By, (t) = Taken By, (c) = Cosigned By    Initials Name Provider Type    COLETTE Crespo, PT Physical Therapist                        Manual Rx (last 36 hours)      Manual Treatments       11/14/17 1500          Manual Rx 1    Manual Rx 1 Location R shoulder  -RB      Manual Rx 1 Type supine GH mobilization, post/inf; distraction w/ oscillation  -RB      Manual Rx 1 Grade GrII  -RB      Manual Rx 1 Duration 10 min  -RB        User Key  (r) = Recorded By, (t) = Taken By, (c) = Cosigned By    Initials Name Provider Type    COLETTE Crespo, PT Physical Therapist                PT OP Goals       11/14/17 1633       Time Calculation    PT Goal Re-Cert Due Date 02/04/18  -RB       User Key  (r) = Recorded By, (t) = Taken By, (c) = Cosigned By    Initials Name Provider Type    COLETTE Crespo, PT Physical Therapist                Therapy Education       11/14/17 1632          Therapy Education    Education Details updated HEP to include SL ER, SL abd, scaption, supine scap depression  -RB      Given HEP  -RB      Program New  -RB      How Provided Verbal;Demonstration;Written  -RB      Provided to Patient  -RB      Level of Understanding Teach back education performed  -RB        User Key  (r) = Recorded By, (t) = Taken  By, (c) = Cosigned By    Initials Name Provider Type    RB Collette Crespo, PT Physical Therapist                Time Calculation:   Start Time: 1310  Total Timed Code Minutes- PT: 30 minute(s)    Therapy Charges for Today     Code Description Service Date Service Provider Modifiers Qty    85505865326 HC PT THER PROC EA 15 MIN 11/14/2017 Collette Crespo, PT GP 1    62257257043 HC PT MANUAL THERAPY EA 15 MIN 11/14/2017 Collette Crespo, PT GP 1    51507085978 HC PT ULTRASOUND EA 15 MIN 11/14/2017 Collette Crespo, PT GP 1                    Collette Crespo, PT  11/14/2017

## 2017-11-20 ENCOUNTER — HOSPITAL ENCOUNTER (OUTPATIENT)
Dept: PHYSICAL THERAPY | Facility: HOSPITAL | Age: 32
Setting detail: THERAPIES SERIES
Discharge: HOME | End: 2017-11-20

## 2017-11-20 DIAGNOSIS — S49.91XA INJURY OF RIGHT SHOULDER, INITIAL ENCOUNTER: Primary | ICD-10-CM

## 2017-11-20 PROCEDURE — 97110 THERAPEUTIC EXERCISES: CPT | Performed by: PHYSICAL THERAPIST

## 2017-11-20 PROCEDURE — 97140 MANUAL THERAPY 1/> REGIONS: CPT | Performed by: PHYSICAL THERAPIST

## 2017-11-20 PROCEDURE — 97035 APP MDLTY 1+ULTRASOUND EA 15: CPT | Performed by: PHYSICAL THERAPIST

## 2017-11-20 NOTE — THERAPY TREATMENT NOTE
Outpatient Physical Therapy Ortho Treatment Note  [facility]     Patient Name: Andreina Segovia  : 1985  MRN: [id number]  Today's Date: 2017      Visit Date: 2017    Visit Dx:    ICD-10-CM ICD-9-CM   1. Injury of right shoulder, initial encounter S49.91XA 959.2       Patient Active Problem List   Diagnosis   • Pelvic pain   • Metatarsalgia of left foot        Past Medical History:   Diagnosis Date   • Bronchitis    • Infectious mononucleosis    • Ovarian cyst    • Wears glasses         Past Surgical History:   Procedure Laterality Date   • COLONOSCOPY     • DIAGNOSTIC LAPAROSCOPY N/A 2017    Procedure: DIAGNOSTIC LAPAROSCOPY, LAP BILATERAL SALPINGECTOMY;  Surgeon: Kaia Humphrey MD;  Location: [facility];  Service:    • WISDOM TOOTH EXTRACTION               PT Ortho       17 1000    Subjective Comments    Subjective Comments Has no issues after her last PT visit. However last Friday had significant anterior shoulder pn after a busy day w/ work and school activities.  -RB    Subjective Pain    Able to rate subjective pain? yes  -RB    Pre-Treatment Pain Level 4  -RB    Post-Treatment Pain Level 5  -RB    Right Shoulder    Flexion AROM Deficit 135  -RB    ABduction AROM Deficit 122  -RB    External Rotation AROM Deficit 68  -RB    Internal Rotation AROM Deficit thumb to T10  -RB    Right Shoulder    Flexion Gross Movement (4+/5) good plus  -RB    ABduction Gross Movement (4/5) good   pn  -RB    Int Rotation Gross Movement (4+/5) good plus  -RB    Ext Rotation Gross Movement (4+/5) good plus  -RB    Right Elbow/Forearm    Elbow Flexion Gross Movement (5/5) normal  -RB    Elbow Extension Gross Movement (5/5) normal  -RB      User Key  (r) = Recorded By, (t) = Taken By, (c) = Cosigned By    Initials Name Provider Type    RB Collette Crespo, PT Physical Therapist                            PT Assessment/Plan       17 1100       PT Assessment    Assessment Comments Pn more  localized to anterior shoulder. Pt reports tenderness to palpation of the anterior subacromial region that is more pronounced w/ shoulder extended, indicating subacromial bursa involvement. She reported slight decrease in pn w/ application of MWM throughout scaption.  -RB     PT Plan    PT Plan Comments Incorporate ionto w/ dex if MD agreeable  -RB       User Key  (r) = Recorded By, (t) = Taken By, (c) = Cosigned By    Initials Name Provider Type    COLETTE Crespo, PT Physical Therapist                Modalities       11/20/17 1000          Ultrasound 76148    Location R shoulder-anterior  -RB      Rx Minutes 8  -RB      Duty Cycle 50  -RB      Frequency 1.0 MHz  -RB      Intensity - Wts/cm 1  -RB        User Key  (r) = Recorded By, (t) = Taken By, (c) = Cosigned By    Initials Name Provider Type    COLETTE Crespo, PT Physical Therapist                Exercises       11/20/17 1000          Subjective Comments    Subjective Comments Has no issues after her last PT visit. However last Friday had significant anterior shoulder pn after a busy day w/ work and school activities.  -RB      Subjective Pain    Able to rate subjective pain? yes  -RB      Pre-Treatment Pain Level 4  -RB      Post-Treatment Pain Level 5  -RB      Exercise 1    Exercise Name 1 Continued ther ex in clinic as per flow sheet. Issued MD progress note.  -RB      Time (Minutes) 1 20  -RB        User Key  (r) = Recorded By, (t) = Taken By, (c) = Cosigned By    Initials Name Provider Type    COLETTE Crespo, PT Physical Therapist                        Manual Rx (last 36 hours)      Manual Treatments       11/20/17 1100          Manual Rx 1    Manual Rx 1 Location R shoulder  -RB      Manual Rx 1 Type supine GH mobilization, post/inf; distraction w/ oscillation  -RB      Manual Rx 1 Grade GrII  -RB      Manual Rx 1 Duration 7 min  -RB      Manual Rx 2    Manual Rx 2 Location R shoulder  -RB      Manual Rx 2 Type Seated inferior/posterior humeral  glide MWM w/ scaption  -RB      Manual Rx 2 Duration 8 reps  -RB        User Key  (r) = Recorded By, (t) = Taken By, (c) = Cosigned By    Initials Name Provider Type    RB Collette Crespo, PT Physical Therapist                PT OP Goals       11/20/17 1102       Time Calculation    PT Goal Re-Cert Due Date 02/04/18  -RB       User Key  (r) = Recorded By, (t) = Taken By, (c) = Cosigned By    Initials Name Provider Type    COLETTE Crespo PT Physical Therapist                    Time Calculation:   Start Time: 0945  Total Timed Code Minutes- PT: 30 minute(s)    Therapy Charges for Today     Code Description Service Date Service Provider Modifiers Qty    90982851106 HC PT MANUAL THERAPY EA 15 MIN 11/20/2017 Collette Crespo, PT GP 1    91505723541 HC PT THER PROC EA 15 MIN 11/20/2017 Collette Crespo, PT GP 1    76376145196 HC PT ULTRASOUND EA 15 MIN 11/20/2017 Collette Crespo, PT GP 1                    Collette Crespo, PT  11/20/2017

## 2017-11-28 ENCOUNTER — HOSPITAL ENCOUNTER (OUTPATIENT)
Dept: PHYSICAL THERAPY | Facility: HOSPITAL | Age: 32
Setting detail: THERAPIES SERIES
Discharge: HOME | End: 2017-11-28

## 2017-11-28 DIAGNOSIS — S49.91XA INJURY OF RIGHT SHOULDER, INITIAL ENCOUNTER: Primary | ICD-10-CM

## 2017-11-28 PROCEDURE — 97110 THERAPEUTIC EXERCISES: CPT | Performed by: PHYSICAL THERAPIST

## 2017-11-28 PROCEDURE — 97035 APP MDLTY 1+ULTRASOUND EA 15: CPT | Performed by: PHYSICAL THERAPIST

## 2017-11-28 NOTE — THERAPY TREATMENT NOTE
Outpatient Physical Therapy Ortho Treatment Note  [facility]     Patient Name: Andreina Segovia  : 1985  MRN: [id number]  Today's Date: 2017      Visit Date: 2017    Visit Dx:    ICD-10-CM ICD-9-CM   1. Injury of right shoulder, initial encounter S49.91XA 959.2       Patient Active Problem List   Diagnosis   • Pelvic pain   • Metatarsalgia of left foot        Past Medical History:   Diagnosis Date   • Bronchitis    • Infectious mononucleosis    • Ovarian cyst    • Wears glasses         Past Surgical History:   Procedure Laterality Date   • COLONOSCOPY     • DIAGNOSTIC LAPAROSCOPY N/A 2017    Procedure: DIAGNOSTIC LAPAROSCOPY, LAP BILATERAL SALPINGECTOMY;  Surgeon: Kaia Humphrey MD;  Location: [facility];  Service:    • WISDOM TOOTH EXTRACTION               PT Ortho       17 1000    Subjective Comments    Subjective Comments Has pn when reaching behind her such as when reaching for items in the backseat of her car.  -RB    Subjective Pain    Able to rate subjective pain? yes  -RB    Pre-Treatment Pain Level 4  -RB    Post-Treatment Pain Level 3  -RB      User Key  (r) = Recorded By, (t) = Taken By, (c) = Cosigned By    Initials Name Provider Type    COLETTE Crespo, PT Physical Therapist                            PT Assessment/Plan       17 1019       PT Assessment    Assessment Comments Pt tolerated progressed ther ex w/o complaint of pn. Initiated ionto take home patch w/ dexamethasone today, and issued updated HEP.  -RB     PT Plan    PT Plan Comments Assess resposne to ionto w/ dex, continue to progress as tolerated.  -RB       User Key  (r) = Recorded By, (t) = Taken By, (c) = Cosigned By    Initials Name Provider Type    COLETTE Crespo, PT Physical Therapist                Modalities       17 1000          Ultrasound [id number]    Location R shoulder-anterior  -RB      Rx Minutes 8  -RB      Duty Cycle 50  -RB      Frequency 1.0 MHz  -RB      Intensity -  Wts/cm 1  -RB      Iontophoresis 62949    Dexamethasone used Yes  -RB      Patch Type --   activa take home patch anterior subacromial region  -RB        User Key  (r) = Recorded By, (t) = Taken By, (c) = Cosigned By    Initials Name Provider Type    COLETTE Crespo, PT Physical Therapist                Exercises       11/28/17 1000          Subjective Comments    Subjective Comments Has pn when reaching behind her such as when reaching for items in the backseat of her car.  -RB      Subjective Pain    Able to rate subjective pain? yes  -RB      Pre-Treatment Pain Level 4  -RB      Post-Treatment Pain Level 3  -RB      Exercise 1    Exercise Name 1 Continued ther ex in clinic as per flow sheet. Progressed to include prone row, prone HAbdER @ 100 deg abd, progressed shoulder ER and shoulder depression to include resistance w/ RTB.  -RB      Time (Minutes) 1 20  -RB        User Key  (r) = Recorded By, (t) = Taken By, (c) = Cosigned By    Initials Name Provider Type    COLETTE Crespo, PT Physical Therapist                               PT OP Goals       11/28/17 1020       Time Calculation    PT Goal Re-Cert Due Date 02/04/18  -RB       User Key  (r) = Recorded By, (t) = Taken By, (c) = Cosigned By    Initials Name Provider Type    COLETTE Crepso PT Physical Therapist                Therapy Education       11/28/17 1018          Therapy Education    Education Details educated on use of ionto patch w/ dexamethasone and instructed to remove if any adverse effects noted. Issued updated HEP to include progressed ther ex as outlined in chart.  -RB      Given HEP;Other (comment)  -RB      Program Progressed  -RB      How Provided Verbal;Demonstration;Written  -RB      Provided to Patient  -RB      Level of Understanding Teach back education performed  -RB        User Key  (r) = Recorded By, (t) = Taken By, (c) = Cosigned By    Initials Name Provider Type    COLETTE Crespo, PT Physical Therapist                 Time Calculation:   Start Time: 0845  Total Timed Code Minutes- PT: 30 minute(s)    Therapy Charges for Today     Code Description Service Date Service Provider Modifiers Qty    18298799621 HC PT THER PROC EA 15 MIN 11/28/2017 Collette Crespo, PT GP 1    76111415018 HC PT ULTRASOUND EA 15 MIN 11/28/2017 Collette Crespo, PT GP 1                    Collette Crespo, PT  11/28/2017

## 2017-12-05 ENCOUNTER — HOSPITAL ENCOUNTER (OUTPATIENT)
Dept: PHYSICAL THERAPY | Facility: HOSPITAL | Age: 32
Setting detail: THERAPIES SERIES
Discharge: HOME | End: 2017-12-05

## 2017-12-05 DIAGNOSIS — S49.91XA INJURY OF RIGHT SHOULDER, INITIAL ENCOUNTER: Primary | ICD-10-CM

## 2017-12-05 PROCEDURE — 97110 THERAPEUTIC EXERCISES: CPT | Performed by: PHYSICAL THERAPIST

## 2017-12-05 NOTE — THERAPY PROGRESS REPORT/RE-CERT
"    Outpatient Physical Therapy Ortho Re-Assessment  [facility]     Patient Name: Andreina Segovia  : 1985  MRN: [id number]  Today's Date: 2017      Visit Date: 2017    Patient Active Problem List   Diagnosis   • Pelvic pain   • Metatarsalgia of left foot        Past Medical History:   Diagnosis Date   • Bronchitis    • Infectious mononucleosis    • Ovarian cyst    • Wears glasses         Past Surgical History:   Procedure Laterality Date   • COLONOSCOPY     • DIAGNOSTIC LAPAROSCOPY N/A 2017    Procedure: DIAGNOSTIC LAPAROSCOPY, LAP BILATERAL SALPINGECTOMY;  Surgeon: Kaia Humphrey MD;  Location: [facility];  Service:    • WISDOM TOOTH EXTRACTION         Visit Dx:     ICD-10-CM ICD-9-CM   1. Injury of right shoulder, initial encounter S49.91XA 959.2                 PT Ortho       17 1100    Subjective Comments    Subjective Comments Pn has been much improved over the past several days, even through her work shifts. Able to lift her arm further overhead w/ little pn.  -RB    Subjective Pain    Able to rate subjective pain? yes  -RB    Pre-Treatment Pain Level 1  -RB    Post-Treatment Pain Level 1  -RB    Shoulder Girdle Palpation    Subacromial Space Right:;Tender  -RB    Shoulder Impingement/Rotator Cuff Special Tests    Ordoñez-Theron Test (RC Lesion vs. Bursitis) Negative  -RB    Neer Impingement Test (RC Lesion vs. Bursitis) Negative  -RB    Empty Can Test (RC Lesion) Negative  -RB    Speed's Test (LH of Biceps Lesion) Negative  -RB    Yergason Test (LH of Biceps Lesion) Negative  -RB    Right Shoulder    Flexion AROM Deficit 160   \"soreness\" ant shoulder at end range  -RB    Flexion PROM Deficit 180  -RB    ABduction AROM Deficit 130  -RB    ABduction PROM Deficit 180  -RB    External Rotation AROM Deficit 75, functionally to C7   \"soreness\" ant shoulder at end range  -RB    External Rotation PROM Deficit 80  -RB    Internal Rotation AROM Deficit thumb to T10  -RB    " Internal Rotation PROM Deficit WFL  -RB    Right Shoulder    Flexion Gross Movement (4+/5) good plus  -RB    ABduction Gross Movement (4+/5) good plus  -RB    Int Rotation Gross Movement (5/5) normal  -RB    Ext Rotation Gross Movement (5/5) normal  -RB    Right Elbow/Forearm    Elbow Flexion Gross Movement (5/5) normal  -RB    Elbow Extension Gross Movement (5/5) normal  -RB      User Key  (r) = Recorded By, (t) = Taken By, (c) = Cosigned By    Initials Name Provider Type    RB Collette Crespo, PT Physical Therapist                                PT OP Goals       12/05/17 1142 12/05/17 1100    PT Short Term Goals    STG Date to Achieve  11/27/17  -RB    STG 1  Pt to be compliant w/ initial HEP for flexibility and strengthening  -RB    STG 1 Progress  Met  -RB    STG 2  Pt to demo full AROM of the R shoulder w/ minimal to no pn.  -RB    STG 2 Progress  Partially Met  -RB    STG 3  Pt to report pn w/ daily activities no greater than 4/10.  -RB    STG 3 Progress  Met  -RB    Long Term Goals    LTG Date to Achieve  12/18/17  -RB    LTG 1  Pt to be independent w/ long term HEP for strengthening, flexibility, symptom mgmt  -RB    LTG 1 Progress  Ongoing  -RB    LTG 2  Pt to improve R shoulder/elbow strength to at least 4+/5 in all planes w/ minimal to no pn upon testing.  -RB    LTG 2 Progress  Met  -RB    LTG 3  Pt to improve QD score by at least 20% to reflect improved pn and function.  -RB    LTG 3 Progress  Met  -RB    Time Calculation    PT Goal Re-Cert Due Date 02/04/18  -RB 02/04/18  -RB      User Key  (r) = Recorded By, (t) = Taken By, (c) = Cosigned By    Initials Name Provider Type    RB Collette Crespo, PT Physical Therapist                PT Assessment/Plan       12/05/17 1138       PT Assessment    Functional Limitations Limitations in functional capacity and performance  -RB     Impairments Pain;Range of motion  -RB     Assessment Comments Pt progressing well w/ PT.  She is responding well to use of  Iontophoresis w/ dexamethasone applied over the anterior subacromial region. She subjectively reports significant reduction in pn, improved function and demonstrates improved strength and ROM of the R shoulder. She continues to lack full AROM and reports mild restrictions in daily function due to continued pn. She would benefit from continued PT services to restore full strength and mobility and allow return to PLOF.  -RB     Please refer to paper survey for additional self-reported information Yes  -RB     Rehab Potential Good  -RB     Patient/caregiver participated in establishment of treatment plan and goals Yes  -RB     Patient would benefit from skilled therapy intervention Yes  -RB     PT Plan    PT Frequency 1x/week  -RB     Predicted Duration of Therapy Intervention (days/wks) 3 visits  -RB     Planned CPT's? PT RE-EVAL: 76553;PT THER PROC EA 15 MIN: 10875;PT MANUAL THERAPY EA 15 MIN: 73658;PT IONTOPHORESIS EA 15 MIN: 72251;PT ULTRASOUND EA 15 MIN: 70758;PT HOT/COLD PACK WC NONMCARE: 94977  -RB     PT Plan Comments May d/c soon if pn and ROM continue to improve. Plan to continue 2-4 additional visits to restore full strength and mobility.  -RB       User Key  (r) = Recorded By, (t) = Taken By, (c) = Cosigned By    Initials Name Provider Type    COLETTE Crespo, PT Physical Therapist                Modalities       12/05/17 1100          Iontophoresis 66456    Milliamps 40  -RB      MA/Min 4  -RB      Dexamethasone used Yes  -RB      Patch Type --   activa take home patch anterior subacromial region  -RB        User Key  (r) = Recorded By, (t) = Taken By, (c) = Cosigned By    Initials Name Provider Type    COLETTE Crespo, PT Physical Therapist              Exercises       12/05/17 1100          Subjective Comments    Subjective Comments Pn has been much improved over the past several days, even through her work shifts. Able to lift her arm further overhead w/ little pn.  -RB      Subjective Pain    Able  to rate subjective pain? yes  -RB      Pre-Treatment Pain Level 1  -RB      Post-Treatment Pain Level 1  -RB      Exercise 1    Exercise Name 1 Performed reassessment followed by gentle ROM/mobility activities.  -RB      Time (Minutes) 1 25  -RB        User Key  (r) = Recorded By, (t) = Taken By, (c) = Cosigned By    Initials Name Provider Type    COLETTE Crespo PT Physical Therapist                              Outcome Measures       12/05/17 1100          Quick DASH    Open a tight or new jar. 2  -RB      Do heavy household chores (e.g., wash walls, wash floors) 2  -RB      Carry a shopping bag or briefcase 3  -RB      Wash your back 3  -RB      Use a knife to cut food 2  -RB      Recreational activities in which you take some force or impact through your arm, should or hand (e.g. golf, hammering, tennis, etc.) 3  -RB      During the past week, to what extent has your arm, shoulder, or hand problem interfered with your normal social activites with family, friends, neighbors or groups? 1  -RB      During the past week, were you limited in your work or other regular daily activities as a result of your arm, shoulder or hand problem? 3  -RB      Arm, Shoulder, or hand pain 2  -RB      Tingling (pins and needles) in your arm, shoulder, or hand 2  -RB      During the past week, how much difficulty have you had sleeping because of the pain in your arm, shoulder or hand? 3  -RB      Number of Questions Answered 11  -RB      Quick DASH Score 34.09  -RB      Functional Assessment    Outcome Measure Options Quick DASH  -RB        User Key  (r) = Recorded By, (t) = Taken By, (c) = Cosigned By    Initials Name Provider Type    COLETTE Crespo PT Physical Therapist            Time Calculation:   Start Time: 1100  Total Timed Code Minutes- PT: 25 minute(s)     Therapy Charges for Today     Code Description Service Date Service Provider Modifiers Qty    70985986711  PT THER PROC EA 15 MIN 12/5/2017 Collette Crespo, PT GP  2          PT G-Codes  Outcome Measure Options: Quick AURORA Crespo, PT  12/5/2017

## 2017-12-12 ENCOUNTER — HOSPITAL ENCOUNTER (OUTPATIENT)
Dept: PHYSICAL THERAPY | Facility: HOSPITAL | Age: 32
Setting detail: THERAPIES SERIES
Discharge: HOME | End: 2017-12-12

## 2017-12-12 DIAGNOSIS — S49.91XA INJURY OF RIGHT SHOULDER, INITIAL ENCOUNTER: Primary | ICD-10-CM

## 2017-12-12 PROCEDURE — 97110 THERAPEUTIC EXERCISES: CPT | Performed by: PHYSICAL THERAPIST

## 2017-12-12 NOTE — THERAPY TREATMENT NOTE
Outpatient Physical Therapy Ortho Treatment Note  [facility]     Patient Name: Andreina Segovia  : 1985  MRN: [id number]  Today's Date: 2017      Visit Date: 2017    Visit Dx:    ICD-10-CM ICD-9-CM   1. Injury of right shoulder, initial encounter S49.91XA 959.2       Patient Active Problem List   Diagnosis   • Pelvic pain   • Metatarsalgia of left foot        Past Medical History:   Diagnosis Date   • Bronchitis    • Infectious mononucleosis    • Ovarian cyst    • Wears glasses         Past Surgical History:   Procedure Laterality Date   • COLONOSCOPY     • DIAGNOSTIC LAPAROSCOPY N/A 2017    Procedure: DIAGNOSTIC LAPAROSCOPY, LAP BILATERAL SALPINGECTOMY;  Surgeon: Kaia Humphrey MD;  Location: [facility];  Service:    • WISDOM TOOTH EXTRACTION               PT Ortho       17 1000    Subjective Comments    Subjective Comments Pn had been improved, but then was exacerbated after a busy weekend at work.  -RB    Subjective Pain    Able to rate subjective pain? yes  -RB    Pre-Treatment Pain Level 3  -RB    Post-Treatment Pain Level 3  -RB    Right Shoulder    ABduction AROM Deficit 145  -RB      User Key  (r) = Recorded By, (t) = Taken By, (c) = Cosigned By    Initials Name Provider Type    RB Collette Crespo, PT Physical Therapist                            PT Assessment/Plan       17 1036       PT Assessment    Assessment Comments Pt presents w/ increased pn after busy weeked at work. Tx program currently focused on rotator cuff strengthening in attempt to decompress subacromial space/bursa and pn control. Pt tolerated ther ex progressions w/o increased pn.  -RB     PT Plan    PT Plan Comments Pt follows up w/ MD in 1 wk-will continue as MD recommends.  -RB       User Key  (r) = Recorded By, (t) = Taken By, (c) = Cosigned By    Initials Name Provider Type    COLETTE Crespo, PT Physical Therapist                Modalities       17 1000          Iontophoresis  23246    Milliamps 40  -RB      MA/Min 4  -RB      Dexamethasone used Yes  -RB      Patch Type --   activa take home patch anterior subacromial region  -RB        User Key  (r) = Recorded By, (t) = Taken By, (c) = Cosigned By    Initials Name Provider Type    COLETTE Crespo, PT Physical Therapist                Exercises       12/12/17 1000          Subjective Comments    Subjective Comments Pn had been improved, but then was exacerbated after a busy weekend at work.  -RB      Subjective Pain    Able to rate subjective pain? yes  -RB      Pre-Treatment Pain Level 3  -RB      Post-Treatment Pain Level 3  -RB      Exercise 1    Exercise Name 1 Continued ther ex as per flow sheet w/ continued focus on rotator cuff strengthening, OH mobility activities. Issued MD progress report  -RB      Time (Minutes) 1 30  -RB        User Key  (r) = Recorded By, (t) = Taken By, (c) = Cosigned By    Initials Name Provider Type    COLETTE Crespo, PT Physical Therapist                               PT OP Goals       12/12/17 1037       Time Calculation    PT Goal Re-Cert Due Date 02/04/18  -RB       User Key  (r) = Recorded By, (t) = Taken By, (c) = Cosigned By    Initials Name Provider Type    COLETTE Crespo, PT Physical Therapist                         Time Calculation:   Start Time: 1000  Total Timed Code Minutes- PT: 30 minute(s)    Therapy Charges for Today     Code Description Service Date Service Provider Modifiers Qty    70774843119  PT THER PROC EA 15 MIN 12/12/2017 Collette Crespo, PT GP 2                    Collette Crespo, PT  12/12/2017

## 2017-12-22 ENCOUNTER — HOSPITAL ENCOUNTER (OUTPATIENT)
Dept: PHYSICAL THERAPY | Facility: HOSPITAL | Age: 32
Setting detail: THERAPIES SERIES
Discharge: HOME | End: 2017-12-22

## 2017-12-22 DIAGNOSIS — S49.91XA INJURY OF RIGHT SHOULDER, INITIAL ENCOUNTER: Primary | ICD-10-CM

## 2017-12-22 PROCEDURE — 97110 THERAPEUTIC EXERCISES: CPT | Performed by: PHYSICAL THERAPIST

## 2017-12-22 NOTE — THERAPY TREATMENT NOTE
Outpatient Physical Therapy Ortho Treatment Note  [facility]     Patient Name: Andreina Segovia  : 1985  MRN: [id number]  Today's Date: 2017      Visit Date: 2017    Visit Dx:    ICD-10-CM ICD-9-CM   1. Injury of right shoulder, initial encounter S49.91XA 959.2       Patient Active Problem List   Diagnosis   • Pelvic pain   • Metatarsalgia of left foot        Past Medical History:   Diagnosis Date   • Bronchitis    • Infectious mononucleosis    • Ovarian cyst    • Wears glasses         Past Surgical History:   Procedure Laterality Date   • COLONOSCOPY     • DIAGNOSTIC LAPAROSCOPY N/A 2017    Procedure: DIAGNOSTIC LAPAROSCOPY, LAP BILATERAL SALPINGECTOMY;  Surgeon: Kaia Humphrey MD;  Location: [facility];  Service:    • WISDOM TOOTH EXTRACTION               PT Ortho       17 1000    Subjective Comments    Subjective Comments Pn about the same. At rest doesn't really bother her, but comes on spontaneously. Saw MD who is referring her to Ortho.  -RB    Subjective Pain    Able to rate subjective pain? yes  -RB    Pre-Treatment Pain Level 0  -RB    Post-Treatment Pain Level 0  -RB      User Key  (r) = Recorded By, (t) = Taken By, (c) = Cosigned By    Initials Name Provider Type    COLETTE Crespo, PT Physical Therapist                            PT Assessment/Plan       17 1035       PT Assessment    Assessment Comments Pn relatively unchanged since previous visit.  Pt tolerated increased resistance w/ ther ex w/o increased pn. ROM improving.  -RB     PT Plan    PT Plan Comments Ortho consult pending. Continue w/ 2 remaining approved visits w/ focus on rotator cuff strengthening, joint mobility, ROM.  -RB       User Key  (r) = Recorded By, (t) = Taken By, (c) = Cosigned By    Initials Name Provider Type    COLETTE Crespo, PT Physical Therapist                Modalities       17 1000          Iontophoresis 85766    Milliamps 40  -RB      MA/Min 4  -RB       Dexamethasone used Yes  -RB      Patch Type --   activa take home patch anterior subacromial region  -RB        User Key  (r) = Recorded By, (t) = Taken By, (c) = Cosigned By    Initials Name Provider Type    COLETTE Crespo PT Physical Therapist                Exercises       12/22/17 1000          Subjective Comments    Subjective Comments Pn about the same. At rest doesn't really bother her, but comes on spontaneously. Saw MD who is referring her to Ortho.  -RB      Subjective Pain    Able to rate subjective pain? yes  -RB      Pre-Treatment Pain Level 0  -RB      Post-Treatment Pain Level 0  -RB      Exercise 1    Exercise Name 1 Continued with ther ex in clinic as per flow sheet. Added weight to prone horizontal abd/ER, prone row, and added prone shoulder extn w/ weight.  -RB      Time (Minutes) 1 35  -RB        User Key  (r) = Recorded By, (t) = Taken By, (c) = Cosigned By    Initials Name Provider Type    COLETTE Crespo PT Physical Therapist                               PT OP Goals       12/22/17 1037       Time Calculation    PT Goal Re-Cert Due Date 02/04/18  -RB       User Key  (r) = Recorded By, (t) = Taken By, (c) = Cosigned By    Initials Name Provider Type    COLETTE Crespo, PT Physical Therapist                         Time Calculation:   Start Time: 0945  Total Timed Code Minutes- PT: 35 minute(s)    Therapy Charges for Today     Code Description Service Date Service Provider Modifiers Qty    55220693668  PT THER PROC EA 15 MIN 12/22/2017 Collette Crespo, PT GP 2                    Collette Crespo, PT  12/22/2017

## 2017-12-27 ENCOUNTER — HOSPITAL ENCOUNTER (OUTPATIENT)
Dept: PHYSICAL THERAPY | Facility: HOSPITAL | Age: 32
Setting detail: THERAPIES SERIES
Discharge: HOME | End: 2017-12-27

## 2017-12-27 DIAGNOSIS — S49.91XA INJURY OF RIGHT SHOULDER, INITIAL ENCOUNTER: Primary | ICD-10-CM

## 2017-12-27 PROCEDURE — 97110 THERAPEUTIC EXERCISES: CPT | Performed by: PHYSICAL THERAPIST

## 2017-12-27 NOTE — THERAPY TREATMENT NOTE
Outpatient Physical Therapy Ortho Treatment Note  [facility]     Patient Name: Andreina Segovia  : 1985  MRN: [id number]  Today's Date: 2017      Visit Date: 2017    Visit Dx:    ICD-10-CM ICD-9-CM   1. Injury of right shoulder, initial encounter S49.91XA 959.2       Patient Active Problem List   Diagnosis   • Pelvic pain   • Metatarsalgia of left foot        Past Medical History:   Diagnosis Date   • Bronchitis    • Infectious mononucleosis    • Ovarian cyst    • Wears glasses         Past Surgical History:   Procedure Laterality Date   • COLONOSCOPY     • DIAGNOSTIC LAPAROSCOPY N/A 2017    Procedure: DIAGNOSTIC LAPAROSCOPY, LAP BILATERAL SALPINGECTOMY;  Surgeon: Kaia Humphrey MD;  Location: [facility];  Service:    • WISDOM TOOTH EXTRACTION               PT Ortho       17 1200    Subjective Comments    Subjective Comments Pt reports that pn is a little improved. She has been able to sleep while laying on her R shoulder.  -RB    Subjective Pain    Able to rate subjective pain? yes  -RB    Pre-Treatment Pain Level 2  -RB    Post-Treatment Pain Level 2  -RB      User Key  (r) = Recorded By, (t) = Taken By, (c) = Cosigned By    Initials Name Provider Type    COLETTE Crespo, PT Physical Therapist                            PT Assessment/Plan       17 1253       PT Assessment    Assessment Comments Pn improved. Pt tolerated progressed resistance well, no complaint of increased pn. Pt has 1 approved visit remaining  -RB     PT Plan    PT Plan Comments Cont w/ last approved visit, request more. Still awaiting ortho consult  -RB       User Key  (r) = Recorded By, (t) = Taken By, (c) = Cosigned By    Initials Name Provider Type    COLETTE Crespo, PT Physical Therapist                Modalities       17 1200          Iontophoresis 29011    Milliamps 40  -RB      MA/Min 4  -RB      Dexamethasone used Yes  -RB      Patch Type --   activa take home patch anterior  subacromial region  -RB        User Key  (r) = Recorded By, (t) = Taken By, (c) = Cosigned By    Initials Name Provider Type    COLETTE Crespo, PT Physical Therapist                Exercises       12/27/17 1200          Subjective Comments    Subjective Comments Pt reports that pn is a little improved. She has been able to sleep while laying on her R shoulder.  -RB      Subjective Pain    Able to rate subjective pain? yes  -RB      Pre-Treatment Pain Level 2  -RB      Post-Treatment Pain Level 2  -RB      Exercise 1    Exercise Name 1 Continued ther ex as per flow sheet. Increased weight to 3 lbs and added robbery w/ YTB.  -RB      Time (Minutes) 1 30  -RB        User Key  (r) = Recorded By, (t) = Taken By, (c) = Cosigned By    Initials Name Provider Type    COLETTE Crespo PT Physical Therapist                               PT OP Goals       12/27/17 1254       Time Calculation    PT Goal Re-Cert Due Date 02/04/18  -RB       User Key  (r) = Recorded By, (t) = Taken By, (c) = Cosigned By    Initials Name Provider Type    COLETTE Crespo PT Physical Therapist                         Time Calculation:   Start Time: 0930  Total Timed Code Minutes- PT: 30 minute(s)    Therapy Charges for Today     Code Description Service Date Service Provider Modifiers Qty    72291466995 HC PT THER PROC EA 15 MIN 12/27/2017 Collette Crespo, PT GP 2                    Collette Crespo, PT  12/27/2017

## 2018-01-02 ENCOUNTER — HOSPITAL ENCOUNTER (OUTPATIENT)
Dept: PHYSICAL THERAPY | Facility: HOSPITAL | Age: 33
Setting detail: THERAPIES SERIES
Discharge: HOME | End: 2018-01-02

## 2018-01-02 DIAGNOSIS — S49.91XA INJURY OF RIGHT SHOULDER, INITIAL ENCOUNTER: Primary | ICD-10-CM

## 2018-01-02 PROCEDURE — 97110 THERAPEUTIC EXERCISES: CPT | Performed by: PHYSICAL THERAPIST

## 2018-01-02 NOTE — THERAPY TREATMENT NOTE
Outpatient Physical Therapy Ortho Treatment Note   [place]     Patient Name: Adnreina Segovia  : 1985  MRN: [id number]  Today's Date: 2018      Visit Date: 2018    Visit Dx:    ICD-10-CM ICD-9-CM   1. Injury of right shoulder, initial encounter S49.91XA 959.2       Patient Active Problem List   Diagnosis   • Pelvic pain   • Metatarsalgia of left foot        Past Medical History:   Diagnosis Date   • Bronchitis    • Infectious mononucleosis    • Ovarian cyst    • Wears glasses         Past Surgical History:   Procedure Laterality Date   • COLONOSCOPY     • DIAGNOSTIC LAPAROSCOPY N/A 2017    Procedure: DIAGNOSTIC LAPAROSCOPY, LAP BILATERAL SALPINGECTOMY;  Surgeon: Kaia Humphrey MD;  Location: [facility];  Service:    • WISDOM TOOTH EXTRACTION               PT Ortho       18 1300    Subjective Comments    Subjective Comments Has had only 1 episode of increased pn over the past several days, occurred while sitting at rest during work. Otherwise reports a soreness in the front of the shoulder w/ movement that limits her ability to reach overhead, behind her back, lift/carry, etc.  -RB    Subjective Pain    Able to rate subjective pain? yes  -RB    Pre-Treatment Pain Level 2  -RB    Post-Treatment Pain Level 2  -RB    Shoulder Girdle Palpation    Subacromial Space Right:;Tender  -RB    Shoulder Impingement/Rotator Cuff Special Tests    Ordoñez-Theron Test (RC Lesion vs. Bursitis) Negative  -RB    Neer Impingement Test (RC Lesion vs. Bursitis) Negative  -RB    Empty Can Test (RC Lesion) Negative  -RB    Speed's Test (LH of Biceps Lesion) Negative  -RB    Yergason Test (LH of Biceps Lesion) Negative  -RB    Right Shoulder    Flexion AROM Deficit 165  -RB    Flexion PROM Deficit 180  -RB    ABduction AROM Deficit 145  -RB    ABduction PROM Deficit 180  -RB    External Rotation AROM Deficit 80  -RB    External Rotation PROM Deficit 80  -RB    Internal Rotation AROM Deficit thumb  to T 10  -RB    Right Shoulder    Flexion Gross Movement (4+/5) good plus  -RB    ABduction Gross Movement (4+/5) good plus  -RB    Int Rotation Gross Movement (5/5) normal  -RB    Ext Rotation Gross Movement (5/5) normal  -RB    Right Elbow/Forearm    Elbow Flexion Gross Movement (5/5) normal  -RB    Elbow Extension Gross Movement (5/5) normal  -RB      User Key  (r) = Recorded By, (t) = Taken By, (c) = Cosigned By    Initials Name Provider Type    COLETTE Crespo, PT Physical Therapist                            PT Assessment/Plan       01/02/18 1410       PT Assessment    Functional Limitations Limitations in functional capacity and performance  -RB     Impairments Pain;Range of motion  -RB     Assessment Comments Pt reports slow but gradual improvement in pn and functional ability. She continues to demonstrates deficits in OH and behind back mobility 2/2 pn over anterior subacromial space, where she remains tender to palpation. Pt still awaiting approval for Ortho consult. She may benefit from continued skilled PT services to further progress strength, ROM and decrease pn.  -RB     Please refer to paper survey for additional self-reported information Yes  -RB     Rehab Potential Good  -RB     Patient/caregiver participated in establishment of treatment plan and goals Yes  -RB     Patient would benefit from skilled therapy intervention Yes  -RB     PT Plan    PT Frequency 1x/week  -RB     Predicted Duration of Therapy Intervention (days/wks) 4-6 visits  -RB     Planned CPT's? PT RE-EVAL: 98158;PT THER PROC EA 15 MIN: 19808;PT MANUAL THERAPY EA 15 MIN: 52866;PT IONTOPHORESIS EA 15 MIN: 64918;PT HOT/COLD PACK WC NONMCARE: 96885  -RB     PT Plan Comments Submit for additional visits. Awaiting ortho consult. Cont PT POC if approved to maximize strength and mobility.  -RB       User Key  (r) = Recorded By, (t) = Taken By, (c) = Cosigned By    Initials Name Provider Type    COLETTE Crespo, PT Physical Therapist                 Modalities       01/02/18 1300          Iontophoresis 31743    Milliamps 40  -RB      MA/Min 4  -RB      Dexamethasone used Yes  -RB      Patch Type --   activa take home patch anterior subacromial region  -RB        User Key  (r) = Recorded By, (t) = Taken By, (c) = Cosigned By    Initials Name Provider Type    COLETTE Crespo, PT Physical Therapist                Exercises       01/02/18 1300          Subjective Comments    Subjective Comments Has had only 1 episode of increased pn over the past several days, occurred while sitting at rest during work. Otherwise reports a soreness in the front of the shoulder w/ movement that limits her ability to reach overhead, behind her back, lift/carry, etc.  -RB      Subjective Pain    Able to rate subjective pain? yes  -RB      Pre-Treatment Pain Level 2  -RB      Post-Treatment Pain Level 2  -RB      Exercise 1    Exercise Name 1 Performed reassessment then continued w/ ther ex in clinic as per flow sheet.  -RB      Time (Minutes) 1 30  -RB        User Key  (r) = Recorded By, (t) = Taken By, (c) = Cosigned By    Initials Name Provider Type    COLETTE Crespo, PT Physical Therapist                               PT OP Goals       01/02/18 1412 01/02/18 1400    PT Short Term Goals    STG Date to Achieve  11/27/17  -RB    STG 1  Pt to be compliant w/ initial HEP for flexibility and strengthening  -RB    STG 1 Progress  Met  -RB    STG 2  Pt to demo full AROM of the R shoulder w/ minimal to no pn.  -RB    STG 2 Progress  Partially Met  -RB    STG 3  Pt to report pn w/ daily activities no greater than 4/10.  -RB    STG 3 Progress  Met  -RB    Long Term Goals    LTG Date to Achieve  12/18/17  -RB    LTG 1  Pt to be independent w/ long term HEP for strengthening, flexibility, symptom mgmt  -RB    LTG 1 Progress  Ongoing  -RB    LTG 2  Pt to improve R shoulder/elbow strength to at least 4+/5 in all planes w/ minimal to no pn upon testing.  -RB    LTG 2 Progress  Met   -RB    LTG 3  Pt to improve QD score by at least 20% to reflect improved pn and function.  -RB    LTG 3 Progress  Met  -RB    Time Calculation    PT Goal Re-Cert Due Date 02/04/18  -RB 02/04/18  -RB      User Key  (r) = Recorded By, (t) = Taken By, (c) = Cosigned By    Initials Name Provider Type    RB Collette Crespo, PT Physical Therapist               Outcome Measure Options: Quick DASH  Quick DASH  Open a tight or new jar.: Mild Difficulty  Do heavy household chores (e.g., wash walls, wash floors): Mild Difficulty  Carry a shopping bag or briefcase: Mild Difficulty  Wash your back: Moderate Difficulty  Use a knife to cut food: No Difficulty  Recreational activities in which you take some force or impact through your arm, should or hand (e.g. golf, hammering, tennis, etc.): Moderate Difficulty  During the past week, to what extent has your arm, shoulder, or hand problem interfered with your normal social activites with family, friends, neighbors or groups?: Slightly  During the past week, were you limited in your work or other regular daily activities as a result of your arm, shoulder or hand problem?: Slightly Limited  Arm, Shoulder, or hand pain: Moderate  Tingling (pins and needles) in your arm, shoulder, or hand: Mild  During the past week, how much difficulty have you had sleeping because of the pain in your arm, shoulder or hand?: Mild Difficulty  Number of Questions Answered: 11  Quick DASH Score: 29.55         Time Calculation:   Start Time: 1330  Total Timed Code Minutes- PT: 30 minute(s)    Therapy Charges for Today     Code Description Service Date Service Provider Modifiers Qty    31770877062 HC PT THER PROC EA 15 MIN 1/2/2018 Collette Crespo, PT GP 2          PT G-Codes  Outcome Measure Options: Quick DASH         Collette Crespo, PT  1/2/2018

## 2018-01-17 ENCOUNTER — OFFICE VISIT (OUTPATIENT)
Dept: ORTHOPEDIC SURGERY | Facility: CLINIC | Age: 33
End: 2018-01-17

## 2018-01-17 VITALS
SYSTOLIC BLOOD PRESSURE: 151 MMHG | HEART RATE: 73 BPM | HEIGHT: 60 IN | BODY MASS INDEX: 45.45 KG/M2 | DIASTOLIC BLOOD PRESSURE: 94 MMHG | WEIGHT: 231.48 LBS

## 2018-01-17 DIAGNOSIS — R52 PAIN: ICD-10-CM

## 2018-01-17 DIAGNOSIS — M75.91 SUPRASPINATUS TENDINITIS, RIGHT: Primary | ICD-10-CM

## 2018-01-17 PROCEDURE — 99204 OFFICE O/P NEW MOD 45 MIN: CPT | Performed by: ORTHOPAEDIC SURGERY

## 2018-01-17 PROCEDURE — 20610 DRAIN/INJ JOINT/BURSA W/O US: CPT | Performed by: ORTHOPAEDIC SURGERY

## 2018-01-17 RX ORDER — NITROFURANTOIN 25; 75 MG/1; MG/1
100 CAPSULE ORAL 2 TIMES DAILY
COMMUNITY
End: 2018-01-24

## 2018-01-17 RX ORDER — LIDOCAINE HYDROCHLORIDE 10 MG/ML
4 INJECTION, SOLUTION INFILTRATION; PERINEURAL
Status: COMPLETED | OUTPATIENT
Start: 2018-01-17 | End: 2018-01-17

## 2018-01-17 RX ORDER — BETAMETHASONE SODIUM PHOSPHATE AND BETAMETHASONE ACETATE 3; 3 MG/ML; MG/ML
6 INJECTION, SUSPENSION INTRA-ARTICULAR; INTRALESIONAL; INTRAMUSCULAR; SOFT TISSUE
Status: COMPLETED | OUTPATIENT
Start: 2018-01-17 | End: 2018-01-17

## 2018-01-17 RX ADMIN — BETAMETHASONE SODIUM PHOSPHATE AND BETAMETHASONE ACETATE 6 MG: 3; 3 INJECTION, SUSPENSION INTRA-ARTICULAR; INTRALESIONAL; INTRAMUSCULAR; SOFT TISSUE at 10:48

## 2018-01-17 RX ADMIN — LIDOCAINE HYDROCHLORIDE 4 ML: 10 INJECTION, SOLUTION INFILTRATION; PERINEURAL at 10:48

## 2018-01-17 NOTE — PROGRESS NOTES
Procedure   Large Joint Arthrocentesis  Date/Time: 1/17/2018 10:48 AM  Consent given by: patient  Site marked: site marked  Timeout: Immediately prior to procedure a time out was called to verify the correct patient, procedure, equipment, support staff and site/side marked as required   Supporting Documentation  Indications: pain   Procedure Details  Location: shoulder - R subacromial bursa  Preparation: Patient was prepped and draped in the usual sterile fashion  Needle size: 22 G  Approach: anterior  Medications administered: 4 mL lidocaine 1 %; 6 mg betamethasone acetate-betamethasone sodium phosphate 6 (3-3) MG/ML (4 cc Bupivicaine .25% NDC: 55150-167-10; LOT: [id number]; EXP: 09/01/2019)  Patient tolerance: patient tolerated the procedure well with no immediate complications

## 2018-01-17 NOTE — PROGRESS NOTES
[facility] [place] [place]    Subjective     Chief Complaint   Patient presents with   • Right Shoulder - Pain        HPI      Andreina Segovia is a 32 y.o. female.  She had a right shoulder flu shot injection October 17, 2017 associated with inflammatory problem.  She's been on ibuprofen and prednisone.  Her pain is 3 out of 10 and throbbing.  She's been in physical therapy for 9 weeks without relief.  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  She is on light duty now      Past Medical History:   Diagnosis Date   • Bronchitis    • Diabetes in pregnancy    • Infectious mononucleosis    • Ovarian cyst    • Wears glasses       Past Surgical History:   Procedure Laterality Date   • COLONOSCOPY     • DIAGNOSTIC LAPAROSCOPY N/A 1/4/2017    Procedure: DIAGNOSTIC LAPAROSCOPY, LAP BILATERAL SALPINGECTOMY;  Surgeon: Kaia Humphrey MD;  Location: [facility];  Service:    • SALPINGECTOMY     • WISDOM TOOTH EXTRACTION        Family History   Problem Relation Age of Onset   • Ulcerative colitis Mother    • Hypertension Mother    • Glaucoma Father    • Osteoarthritis Father    • Cancer Other    • Diabetes Other    • Heart attack Other    • Stroke Other      Social History     Social History   • Marital status:      Spouse name: N/A   • Number of children: N/A   • Years of education: N/A     Occupational History   • Not on file.     Social History Main Topics   • Smoking status: Never Smoker   • Smokeless tobacco: Never Used   • Alcohol use Yes      Comment: 2 per week   • Drug use: No   • Sexual activity: No     Other Topics Concern   • Not on file     Social History Narrative      Current Outpatient Prescriptions on File Prior to Visit   Medication Sig Dispense Refill   • cetirizine (zyrTEC) 10 MG tablet Take 10 mg by mouth Daily.     • HYDROcodone-acetaminophen (NORCO) 5-325 MG per tablet Take 1-2 tablets by mouth Every 4 (Four)  "Hours As Needed (Pain). 20 tablet 0   • cholecalciferol (VITAMIN D3) 1000 UNITS tablet Take 2,000 Units by mouth Daily.       No current facility-administered medications on file prior to visit.       Allergies   Allergen Reactions   • Adhesive Tape         The following portions of the patient's history were reviewed and updated as appropriate: allergies, current medications, past family history, past medical history, past social history, past surgical history and problem list.    Review of Systems   Constitutional: Positive for fatigue and fever.   HENT: Positive for congestion and ear pain.    Eyes: Negative.    Respiratory: Negative.    Cardiovascular: Negative.    Gastrointestinal: Positive for abdominal pain.   Endocrine: Negative.    Genitourinary: Positive for flank pain.   Musculoskeletal: Positive for arthralgias.   Skin: Negative.    Allergic/Immunologic: Positive for environmental allergies.   Neurological: Negative.    Hematological: Negative.    Psychiatric/Behavioral: Negative.         Objective      Physical Exam  /94  Pulse 73  Ht 151.1 cm (59.5\")  Wt 105 kg (231 lb 7.7 oz)  BMI 45.97 kg/m2    Body mass index is 45.97 kg/(m^2).        GENERAL APPEARANCE: awake, alert & oriented x 3, in no acute distress and well developed, well nourished  PSYCH: normal mood andaffect  LUNGS:  breathing nonlabored, no wheezing  EYES: sclera anicteric, pupils equal  CARDIOVASCULAR: palpable pulses dorsalis pedis, palpable posterior tibial bilaterally. Capillary refill less than 2 seconds  INTEGUMENTARY: skin intact, no clubbing, cyanosis  NEUROLOGIC:  Normal gait and balance            Ortho Exam  Musculoskeletal   Upper Extremity   Right Shoulder     Inspection and Palpation:     Tenderness - none    Crepitus - none    Sensation is normal    Examination reveals no ecchymosis.      Strength and Tone:    Supraspinatus - 5/5    Infraspinatus - 5/5    Subscapularis - 5/5    Deltoid - 5/5     Range of " Motion   Left shoulder:    Internal Rotation: ROM - T7    External Rotation: AROM - 80 degrees    Elevation through flexion: AROM - 180 degrees    Right Shoulder:    Internal Rotation: ROM - T7    External Rotation: AROM - 80 degrees    Elevation through flexion: AROM - 180 degrees     Abduction -180 degrees     Instability   Right shoulder    Sulcus sign negative    Apprehension test negative    Miracle relocation test negative    Jerk test negative   Impingement   Right shoulder    Ordoñez impingement test positive    Neer impingement test positive   Functional Testing   Right shoulder    AC crossover adduction test negative    Abdominal compression test negative    Lift-off sign negative    Speed's test negative    Greene's test negative    Horriblower's sign negative       Imaging/Studies  Imaging Results (last 24 hours)     Procedure Component Value Units Date/Time    XR Shoulder 2+ View Right [[id number]] Resulted:  01/17/18 1038     Updated:  01/17/18 1039    Narrative:       Right Shoulder X-Ray  Indication: Pain  AP, scapular Y, and axillary lateral views    Findings:  No fracture  No bony lesion  Normal soft tissues  Normal joint spaces    No prior studies were available for comparison.            Assessment/Plan      Andreina was seen today for pain.    Diagnoses and all orders for this visit:    Pain  -     XR Shoulder 2+ View Right    Supraspinatus tendinitis, right    Other orders  -     Large Joint Arthrocentesis    I gave her right shoulder subacromial steroid injection today.  She tolerated the injection well without complication.  She will continue physical therapy and follow up in 3 weeks.  She will continue work restrictions of no lifting more than 15 pounds on the right no shoulder level or above work no push or pull more than 30 pounds    Medical Decision Making  Management Options : over-the-counter medicine, prescription/IM medicine and physical/occupational therapy  Data/Risk: radiology tests and  independent visualization of imaging, lab tests, or EMG/NCV    Gabriel David MD  01/17/18  10:57 AM

## 2018-01-24 ENCOUNTER — OFFICE VISIT (OUTPATIENT)
Dept: RETAIL CLINIC | Facility: CLINIC | Age: 33
End: 2018-01-24

## 2018-01-24 VITALS
WEIGHT: 232 LBS | HEIGHT: 60 IN | HEART RATE: 126 BPM | RESPIRATION RATE: 20 BRPM | TEMPERATURE: 97.8 F | SYSTOLIC BLOOD PRESSURE: 118 MMHG | BODY MASS INDEX: 45.55 KG/M2 | DIASTOLIC BLOOD PRESSURE: 68 MMHG | OXYGEN SATURATION: 99 %

## 2018-01-24 DIAGNOSIS — H69.82 EUSTACHIAN TUBE DYSFUNCTION, LEFT: Primary | ICD-10-CM

## 2018-01-24 PROCEDURE — 99213 OFFICE O/P EST LOW 20 MIN: CPT | Performed by: NURSE PRACTITIONER

## 2018-01-24 RX ORDER — FLUTICASONE PROPIONATE 50 MCG
2 SPRAY, SUSPENSION (ML) NASAL DAILY
Qty: 1 BOTTLE | Refills: 0 | Status: SHIPPED | OUTPATIENT
Start: 2018-01-24 | End: 2018-02-23

## 2018-01-24 RX ORDER — MECLIZINE HCL 12.5 MG/1
12.5 TABLET ORAL 3 TIMES DAILY PRN
Qty: 90 TABLET | Refills: 0 | Status: SHIPPED | OUTPATIENT
Start: 2018-01-24 | End: 2018-02-23

## 2018-01-24 NOTE — PATIENT INSTRUCTIONS
Eustachian Tube Dysfunction  Introduction  The eustachian tube connects the middle ear to the back of the nose. It regulates air pressure in the middle ear by allowing air to move between the ear and nose. It also helps to drain fluid from the middle ear space. When the eustachian tube does not function properly, air pressure, fluid, or both can build up in the middle ear.  Eustachian tube dysfunction can affect one or both ears.  What are the causes?  This condition happens when the eustachian tube becomes blocked or cannot open normally. This may result from:  · Ear infections.  · Colds and other upper respiratory infections.  · Allergies.  · Irritation, such as from cigarette smoke or acid from the stomach coming up into the esophagus (gastroesophageal reflux).  · Sudden changes in air pressure, such as from descending in an airplane.  · Abnormal growths in the nose or throat, such as nasal polyps, tumors, or enlarged tissue at the back of the throat (adenoids).  What increases the risk?  This condition may be more likely to develop in people who smoke and people who are overweight. Eustachian tube dysfunction may also be more likely to develop in children, especially children who have:  · Certain birth defects of the mouth, such as cleft palate.  · Large tonsils and adenoids.  What are the signs or symptoms?  Symptoms of this condition may include:  · A feeling of fullness in the ear.  · Ear pain.  · Clicking or popping noises in the ear.  · Ringing in the ear.  · Hearing loss.  · Loss of balance.  Symptoms may get worse when the air pressure around you changes, such as when you travel to an area of high elevation or fly on an airplane.  How is this diagnosed?  This condition may be diagnosed based on:  · Your symptoms.  · A physical exam of your ear, nose, and throat.  · Tests, such as those that measure:  ¨ The movement of your eardrum (tympanogram).  ¨ Your hearing (audiometry).  How is this  "treated?  Treatment depends on the cause and severity of your condition. If your symptoms are mild, you may be able to relieve your symptoms by moving air into (\"popping\") your ears. If you have symptoms of fluid in your ears, treatment may include:  · Decongestants.  · Antihistamines.  · Nasal sprays or ear drops that contain medicines that reduce swelling (steroids).  In some cases, you may need to have a procedure to drain the fluid in your eardrum (myringotomy). In this procedure, a small tube is placed in the eardrum to:  · Drain the fluid.  · Restore the air in the middle ear space.  Follow these instructions at home:  · Take over-the-counter and prescription medicines only as told by your health care provider.  · Use techniques to help pop your ears as recommended by your health care provider. These may include:  ¨ Chewing gum.  ¨ Yawning.  ¨ Frequent, forceful swallowing.  ¨ Closing your mouth, holding your nose closed, and gently blowing as if you are trying to blow air out of your nose.  · Do not do any of the following until your health care provider approves:  ¨ Travel to high altitudes.  ¨ Fly in airplanes.  ¨ Work in a pressurized cabin or room.  ¨ Scuba dive.  · Keep your ears dry. Dry your ears completely after showering or bathing.  · Do not smoke.  · Keep all follow-up visits as told by your health care provider. This is important.  Contact a health care provider if:  · Your symptoms do not go away after treatment.  · Your symptoms come back after treatment.  · You are unable to pop your ears.  · You have:  ¨ A fever.  ¨ Pain in your ear.  ¨ Pain in your head or neck.  ¨ Fluid draining from your ear.  · Your hearing suddenly changes.  · You become very dizzy.  · You lose your balance.  This information is not intended to replace advice given to you by your health care provider. Make sure you discuss any questions you have with your health care provider.  Document Released: 01/13/2017 Document " Revised: 05/25/2017 Document Reviewed: 01/06/2016  © 2017 [employer]

## 2018-01-24 NOTE — PROGRESS NOTES
"Subjective   Andreina Segovia is a 32 y.o. female.      CHIEF COMPLAINT  Earache      Earache    There is pain in the left ear. This is a new problem. The current episode started in the past 7 days. The problem occurs constantly. The problem has been gradually worsening. There has been no fever. The pain is at a severity of 5/10. The pain is moderate. Pertinent negatives include no abdominal pain, coughing, diarrhea, ear discharge, headaches, hearing loss, rash, rhinorrhea, sore throat or vomiting. She has tried NSAIDs for the symptoms. The treatment provided mild relief.       The following portions of the patient's history were reviewed and updated as appropriate: allergies, current mediations, past family history, past medical history, past social history, past surgical history, and problem list.    Review of Systems   Constitutional: Positive for fatigue. Negative for appetite change, chills and fever.   HENT: Positive for ear pain and tinnitus. Negative for congestion, ear discharge, hearing loss, rhinorrhea, sneezing, sore throat and voice change.    Eyes: Negative for pain and itching.   Respiratory: Negative for cough, shortness of breath and wheezing.    Gastrointestinal: Negative for abdominal pain, diarrhea, nausea and vomiting.   Skin: Negative for rash.   Neurological: Positive for dizziness. Negative for light-headedness and headaches.         Objective   Allergies   Allergen Reactions   • Adhesive Tape          /68  Pulse (!) 126  Temp 97.8 °F (36.6 °C) (Temporal Artery )   Resp 20  Ht 152.4 cm (60\")  Wt 105 kg (232 lb)  LMP 01/20/2018  SpO2 99%  BMI 45.31 kg/m2    Physical Exam   Constitutional: She is oriented to person, place, and time. She appears well-developed and well-nourished. No distress.   HENT:   Head: Normocephalic.   Right Ear: Tympanic membrane, external ear and ear canal normal.   Left Ear: External ear and ear canal normal. Tympanic membrane is not injected and not " erythematous. A middle ear effusion is present.   Nose: Mucosal edema present. Right sinus exhibits no maxillary sinus tenderness and no frontal sinus tenderness. Left sinus exhibits no maxillary sinus tenderness and no frontal sinus tenderness.   Mouth/Throat: Uvula is midline and mucous membranes are normal. Posterior oropharyngeal erythema present. No oropharyngeal exudate or posterior oropharyngeal edema.   Eyes: Conjunctivae are normal.   Cardiovascular: Normal rate, regular rhythm and normal heart sounds.    Pulmonary/Chest: Effort normal and breath sounds normal.   Lymphadenopathy:     She has no cervical adenopathy.   Neurological: She is alert and oriented to person, place, and time.       Assessment/Plan   Andreina was seen today for earache.    Diagnoses and all orders for this visit:    Eustachian tube dysfunction, left  -     fluticasone (FLONASE) 50 MCG/ACT nasal spray; 2 sprays into each nostril Daily for 30 days.  -     meclizine (ANTIVERT) 12.5 MG tablet; Take 1 tablet by mouth 3 (Three) Times a Day As Needed for dizziness for up to 30 days. Advised this may cause drowsiness, do not take if driving or going to work.  - Drink plenty of clear, decaffeinated fluids, as tolerated.  - Acetaminophen or ibuprofen, per package directions, as needed for earache  - Warm compress to left ear, as needed for pain  - Follow up with PCP if these measures are not effective, for further evaluation.  Patient states she has had multiple ear infections as an adult.       An After Visit Summary was printed, reviewed, and given to the patient. Understanding verbalized and agrees with treatment plan.  If no improvement or becomes worse, follow up with primary or go to [facility].        January 24, 2018  11:53 AM

## 2018-01-25 ENCOUNTER — HOSPITAL ENCOUNTER (OUTPATIENT)
Dept: PHYSICAL THERAPY | Facility: HOSPITAL | Age: 33
Setting detail: THERAPIES SERIES
Discharge: HOME | End: 2018-01-25

## 2018-01-25 DIAGNOSIS — S49.91XA INJURY OF RIGHT SHOULDER, INITIAL ENCOUNTER: Primary | ICD-10-CM

## 2018-01-25 PROCEDURE — 97110 THERAPEUTIC EXERCISES: CPT | Performed by: PHYSICAL THERAPIST

## 2018-01-25 NOTE — THERAPY PROGRESS REPORT/RE-CERT
Outpatient Physical Therapy Ortho Re-Assessment  [facility]     Patient Name: Andreina Segovia  : 1985  MRN: [id number]  Today's Date: 2018      Visit Date: 2018    Patient Active Problem List   Diagnosis   • Pelvic pain   • Metatarsalgia of left foot        Past Medical History:   Diagnosis Date   • Bronchitis    • Diabetes in pregnancy    • Infectious mononucleosis    • Ovarian cyst    • Wears glasses         Past Surgical History:   Procedure Laterality Date   • COLONOSCOPY     • DIAGNOSTIC LAPAROSCOPY N/A 2017    Procedure: DIAGNOSTIC LAPAROSCOPY, LAP BILATERAL SALPINGECTOMY;  Surgeon: Kaia Humphrey MD;  Location: [facility];  Service:    • SALPINGECTOMY     • WISDOM TOOTH EXTRACTION         Visit Dx:     ICD-10-CM ICD-9-CM   1. Injury of right shoulder, initial encounter S49.91XA 959.2                 PT Ortho       18 1400    Subjective Comments    Subjective Comments Had injection at MD visit. [place] good a few days then pn returned. Returns for followup 18. Has continued w/ HEP  -RB    Subjective Pain    Able to rate subjective pain? yes  -RB    Pre-Treatment Pain Level 3  -RB    Post-Treatment Pain Level 2  -RB    Shoulder Girdle Palpation    Subacromial Space Right:;Tender  -RB    Shoulder Impingement/Rotator Cuff Special Tests    Ordoñez-Theron Test (RC Lesion vs. Bursitis) Positive  -RB    Empty Can Test (RC Lesion) Positive  -RB    Speed's Test (LH of Biceps Lesion) Negative  -RB    Yergason Test (LH of Biceps Lesion) Negative  -RB    Right Shoulder    Flexion AROM Deficit 160  -RB    Flexion PROM Deficit 180  -RB    ABduction AROM Deficit 145  -RB    ABduction PROM Deficit 180  -RB    External Rotation AROM Deficit 80  -RB    External Rotation PROM Deficit 80  -RB    Internal Rotation AROM Deficit thumb to T10  -RB    Right Shoulder    Flexion Gross Movement (4+/5) good plus  -RB    ABduction Gross Movement (5/5) normal  -RB    Int Rotation Gross Movement  (5/5) normal  -RB    Ext Rotation Gross Movement (5/5) normal  -RB      User Key  (r) = Recorded By, (t) = Taken By, (c) = Cosigned By    Initials Name Provider Type    COLETTE Crespo PT Physical Therapist                      Therapy Education  Education Details: updated HEP to include posterior capsule and sleeper stretches  Given: HEP  Program: Modified  How Provided: Verbal  Provided to: Patient  Level of Understanding: Teach back education performed           PT OP Goals       01/25/18 1616 01/25/18 1600    PT Short Term Goals    STG Date to Achieve  11/27/17  -RB    STG 1  Pt to be compliant w/ initial HEP for flexibility and strengthening  -RB    STG 1 Progress  Met  -RB    STG 2  Pt to demo full AROM of the R shoulder w/ minimal to no pn.  -RB    STG 2 Progress  Partially Met  -RB    STG 3  Pt to report pn w/ daily activities no greater than 4/10.  -RB    STG 3 Progress  Met  -RB    Long Term Goals    LTG Date to Achieve  12/18/17  -RB    LTG 1  Pt to be independent w/ long term HEP for strengthening, flexibility, symptom mgmt  -RB    LTG 1 Progress  Ongoing  -RB    LTG 2  Pt to improve R shoulder/elbow strength to at least 4+/5 in all planes w/ minimal to no pn upon testing.  -RB    LTG 2 Progress  Met  -RB    LTG 3  Pt to improve QD score by at least 20% to reflect improved pn and function.  -RB    LTG 3 Progress  Met  -RB    Time Calculation    PT Goal Re-Cert Due Date 02/04/18  -RB 02/04/18  -RB      User Key  (r) = Recorded By, (t) = Taken By, (c) = Cosigned By    Initials Name Provider Type    COLETTE Crespo, PT Physical Therapist                PT Assessment/Plan       01/25/18 1613       PT Assessment    Functional Limitations Limitations in functional capacity and performance;Performance in work activities  -RB     Impairments Pain;Range of motion  -RB     Assessment Comments Pt has completed Ortho consult. She was diagnosed w/ bursitis/tendonitis and administered a cortisone injection which  provided her w/ a few days of pn relief. She continues to show signs of bursitis/impingement, though pn at end range elevation slightly improved. Initiated stretching directed at the posterior capsule this visit. Pt would benefit from continued skilled PT services.  -RB     Please refer to paper survey for additional self-reported information Yes  -RB     Rehab Potential Good  -RB     Patient/caregiver participated in establishment of treatment plan and goals Yes  -RB     Patient would benefit from skilled therapy intervention Yes  -RB     PT Plan    PT Frequency 2x/week  -RB     Predicted Duration of Therapy Intervention (days/wks) 6 visits  -RB     Planned CPT's? PT RE-EVAL: 30677;PT THER PROC EA 15 MIN: 47841;PT NEUROMUSC RE-EDUCATION EA 15 MIN: 87932;PT HOT/COLD PACK WC NONMCARE: 24637  -RB     PT Plan Comments Cont POC w/ focus on restoring posterior capsule mobility, progressing scapular stabilization as tolerated.  -RB       User Key  (r) = Recorded By, (t) = Taken By, (c) = Cosigned By    Initials Name Provider Type    COLETTE Crespo, PT Physical Therapist                  Exercises       01/25/18 1400          Subjective Comments    Subjective Comments Had injection at MD visit. [place] good a few days then pn returned. Returns for followup 2/7/18. Has continued w/ HEP  -RB      Subjective Pain    Able to rate subjective pain? yes  -RB      Pre-Treatment Pain Level 3  -RB      Post-Treatment Pain Level 2  -RB      Exercise 1    Exercise Name 1 Performed reassessment then continued w/ ther ex in clinic per flow sheet. Added posterior capsule stretch and sleeper stretch.  -RB      Time (Minutes) 1 30  -RB        User Key  (r) = Recorded By, (t) = Taken By, (c) = Cosigned By    Initials Name Provider Type    COLETTE Crespo, PT Physical Therapist                                  Time Calculation:   Start Time: 1425  Total Timed Code Minutes- PT: 30 minute(s)     Therapy Charges for Today     Code Description  Service Date Service Provider Modifiers Qty    41705369688 HC PT THER PROC EA 15 MIN 1/25/2018 Collette Crespo, PT GP 2                    Collette Crespo, PT  1/25/2018

## 2018-01-26 ENCOUNTER — HOSPITAL ENCOUNTER (OUTPATIENT)
Dept: PHYSICAL THERAPY | Facility: HOSPITAL | Age: 33
Setting detail: THERAPIES SERIES
Discharge: HOME | End: 2018-01-26

## 2018-01-26 DIAGNOSIS — S49.91XA INJURY OF RIGHT SHOULDER, INITIAL ENCOUNTER: Primary | ICD-10-CM

## 2018-01-26 PROCEDURE — 97110 THERAPEUTIC EXERCISES: CPT | Performed by: PHYSICAL THERAPIST

## 2018-01-26 NOTE — THERAPY TREATMENT NOTE
Outpatient Physical Therapy Ortho Treatment Note   [place]     Patient Name: Andreina Segovia  : 1985  MRN: [id number]  Today's Date: 2018      Visit Date: 2018    Visit Dx:    ICD-10-CM ICD-9-CM   1. Injury of right shoulder, initial encounter S49.91XA 959.2       Patient Active Problem List   Diagnosis   • Pelvic pain   • Metatarsalgia of left foot        Past Medical History:   Diagnosis Date   • Bronchitis    • Diabetes in pregnancy    • Infectious mononucleosis    • Ovarian cyst    • Wears glasses         Past Surgical History:   Procedure Laterality Date   • COLONOSCOPY     • DIAGNOSTIC LAPAROSCOPY N/A 2017    Procedure: DIAGNOSTIC LAPAROSCOPY, LAP BILATERAL SALPINGECTOMY;  Surgeon: Kaia Humphrey MD;  [facility]  Service:    • SALPINGECTOMY     • WISDOM TOOTH EXTRACTION               PT Ortho       18 1200    Subjective Comments    Subjective Comments Reports that she had a little pn in the R shoulder last night, but improved today.  -RB    Subjective Pain    Able to rate subjective pain? yes  -RB    Pre-Treatment Pain Level 1  -RB    Post-Treatment Pain Level 1  -RB      18 1400    Subjective Comments    Subjective Comments Had injection at MD visit. [place] good a few days then pn returned. Returns for followup 18. Has continued w/ HEP  -RB    Subjective Pain    Able to rate subjective pain? yes  -RB    Pre-Treatment Pain Level 3  -RB    Post-Treatment Pain Level 2  -RB    Shoulder Girdle Palpation    Subacromial Space Right:;Tender  -RB    Shoulder Impingement/Rotator Cuff Special Tests    Ordoñez-Theron Test (RC Lesion vs. Bursitis) Positive  -RB    Empty Can Test (RC Lesion) Positive  -RB    Speed's Test (LH of Biceps Lesion) Negative  -RB    Yergason Test (LH of Biceps Lesion) Negative  -RB    Right Shoulder    Flexion AROM Deficit 160  -RB    Flexion PROM Deficit 180  -RB    ABduction AROM Deficit 145  -RB    ABduction PROM Deficit 180   -RB    External Rotation AROM Deficit 80  -RB    External Rotation PROM Deficit 80  -RB    Internal Rotation AROM Deficit thumb to T10  -RB    Right Shoulder    Flexion Gross Movement (4+/5) good plus  -RB    ABduction Gross Movement (5/5) normal  -RB    Int Rotation Gross Movement (5/5) normal  -RB    Ext Rotation Gross Movement (5/5) normal  -RB      User Key  (r) = Recorded By, (t) = Taken By, (c) = Cosigned By    Initials Name Provider Type    RB Collette Crespo, PT Physical Therapist                            PT Assessment/Plan       01/26/18 1249 01/25/18 1613    PT Assessment    Functional Limitations  Limitations in functional capacity and performance;Performance in work activities  -RB    Impairments  Pain;Range of motion  -RB    Assessment Comments Provided cues for scap depression w/ weighted scaption. Pt tolerated ther ex additions well. Posterior glenohumeral glide mildly restricted due to posterior capusle tightness.  -RB Pt has completed Ortho consult. She was diagnosed w/ bursitis/tendonitis and administered a cortisone injection which provided her w/ a few days of pn relief. She continues to show signs of bursitis/impingement, though pn at end range elevation slightly improved. Initiated stretching directed at the posterior capsule this visit. Pt would benefit from continued skilled PT services.  -RB    Please refer to paper survey for additional self-reported information  Yes  -RB    Rehab Potential  Good  -RB    Patient/caregiver participated in establishment of treatment plan and goals  Yes  -RB    Patient would benefit from skilled therapy intervention  Yes  -RB    PT Plan    PT Frequency  2x/week  -RB    Predicted Duration of Therapy Intervention (days/wks)  6 visits  -RB    Planned CPT's?  PT RE-EVAL: 15831;PT THER PROC EA 15 MIN: 22279;PT NEUROMUSC RE-EDUCATION EA 15 MIN: 67225;PT HOT/COLD PACK WC NONMCARE: 19427  -RB    PT Plan Comments Cont per POC.  -RB Cont POC w/ focus on restoring  posterior capsule mobility, progressing scapular stabilization as tolerated.  -RB      User Key  (r) = Recorded By, (t) = Taken By, (c) = Cosigned By    Initials Name Provider Type    COLETTE Crespo, PT Physical Therapist                    Exercises       01/26/18 1200 01/25/18 1400       Subjective Comments    Subjective Comments Reports that she had a little pn in the R shoulder last night, but improved today.  -RB Had injection at MD visit. [place] good a few days then pn returned. Returns for followup 2/7/18. Has continued w/ HEP  -RB     Subjective Pain    Able to rate subjective pain? yes  -RB yes  -RB     Pre-Treatment Pain Level 1  -RB 3  -RB     Post-Treatment Pain Level 1  -RB 2  -RB     Exercise 1    Exercise Name 1 Ther ex in clinic per flow sheet. Added serratus slides at wall, focused on scap depression during weighted scaption, increased weight w/ 90/90 ER.  -RB Performed reassessment then continued w/ ther ex in clinic per flow sheet. Added posterior capsule stretch and sleeper stretch.  -RB     Time (Minutes) 1 25  -RB 30  -RB       User Key  (r) = Recorded By, (t) = Taken By, (c) = Cosigned By    Initials Name Provider Type    COLETTE Crespo, PT Physical Therapist                        Manual Rx (last 36 hours)      Manual Treatments       01/26/18 1100          Manual Rx 1    Manual Rx 1 Location R shoulder  -RB      Manual Rx 1 Type supine GH mobilization, post/inf; distraction w/ oscillation  -RB      Manual Rx 1 Grade GrII-III  -RB      Manual Rx 1 Duration 5 min  -RB        User Key  (r) = Recorded By, (t) = Taken By, (c) = Cosigned By    Initials Name Provider Type    COLETTE Crespo, PT Physical Therapist                PT OP Goals       01/26/18 1251 01/25/18 1616    Time Calculation    PT Goal Re-Cert Due Date 02/04/18  -RB 02/04/18  -RB      01/25/18 1600       PT Short Term Goals    STG Date to Achieve 11/27/17  -RB     STG 1 Pt to be compliant w/ initial HEP for flexibility and  strengthening  -RB     STG 1 Progress Met  -RB     STG 2 Pt to demo full AROM of the R shoulder w/ minimal to no pn.  -RB     STG 2 Progress Partially Met  -RB     STG 3 Pt to report pn w/ daily activities no greater than 4/10.  -RB     STG 3 Progress Met  -RB     Long Term Goals    LTG Date to Achieve 12/18/17  -RB     LTG 1 Pt to be independent w/ long term HEP for strengthening, flexibility, symptom mgmt  -RB     LTG 1 Progress Ongoing  -RB     LTG 2 Pt to improve R shoulder/elbow strength to at least 4+/5 in all planes w/ minimal to no pn upon testing.  -RB     LTG 2 Progress Met  -RB     LTG 3 Pt to improve QD score by at least 20% to reflect improved pn and function.  -RB     LTG 3 Progress Met  -RB     Time Calculation    PT Goal Re-Cert Due Date 02/04/18  -RB       User Key  (r) = Recorded By, (t) = Taken By, (c) = Cosigned By    Initials Name Provider Type    RB Collette Crespo, PT Physical Therapist                         Time Calculation:   Start Time: 1115  Total Timed Code Minutes- PT: 30 minute(s)    Therapy Charges for Today     Code Description Service Date Service Provider Modifiers Qty    45688767503 HC PT THER PROC EA 15 MIN 1/26/2018 Collette Crespo, PT GP 2                    Collette Crespo, PT  1/26/2018

## 2018-01-27 ENCOUNTER — TELEPHONE (OUTPATIENT)
Dept: RETAIL CLINIC | Facility: CLINIC | Age: 33
End: 2018-01-27

## 2018-01-27 NOTE — TELEPHONE ENCOUNTER
Patient called to say she can't tolerate meclizine. Advised to stop this and try sudafed along with continuation of flonase nasal spray for fluid behind her ear and ear pain. If no improvement she should follow up with her PCP.   GISELLE Scott

## 2018-01-31 ENCOUNTER — HOSPITAL ENCOUNTER (OUTPATIENT)
Dept: PHYSICAL THERAPY | Facility: HOSPITAL | Age: 33
Setting detail: THERAPIES SERIES
Discharge: HOME | End: 2018-01-31

## 2018-01-31 DIAGNOSIS — S49.91XA INJURY OF RIGHT SHOULDER, INITIAL ENCOUNTER: Primary | ICD-10-CM

## 2018-01-31 PROCEDURE — 97110 THERAPEUTIC EXERCISES: CPT | Performed by: PHYSICAL THERAPIST

## 2018-02-06 ENCOUNTER — HOSPITAL ENCOUNTER (OUTPATIENT)
Dept: PHYSICAL THERAPY | Facility: HOSPITAL | Age: 33
Setting detail: THERAPIES SERIES
Discharge: HOME | End: 2018-02-06

## 2018-02-06 DIAGNOSIS — S49.91XA INJURY OF RIGHT SHOULDER, INITIAL ENCOUNTER: Primary | ICD-10-CM

## 2018-02-06 PROCEDURE — 97110 THERAPEUTIC EXERCISES: CPT | Performed by: PHYSICAL THERAPIST

## 2018-02-06 NOTE — THERAPY TREATMENT NOTE
Outpatient Physical Therapy Ortho Progress Note  [facility]     Patient Name: Andreina Segovia  : 1985  MRN: [id number]  Today's Date: 2018      Visit Date: 2018    Visit Dx:    ICD-10-CM ICD-9-CM   1. Injury of right shoulder, initial encounter S49.91XA 959.2       Patient Active Problem List   Diagnosis   • Pelvic pain   • Metatarsalgia of left foot        Past Medical History:   Diagnosis Date   • Bronchitis    • Diabetes in pregnancy    • Infectious mononucleosis    • Ovarian cyst    • Wears glasses         Past Surgical History:   Procedure Laterality Date   • COLONOSCOPY     • DIAGNOSTIC LAPAROSCOPY N/A 2017    Procedure: DIAGNOSTIC LAPAROSCOPY, LAP BILATERAL SALPINGECTOMY;  Surgeon: Kaia Humphrey MD;  Location: [facility];  Service:    • SALPINGECTOMY     • WISDOM TOOTH EXTRACTION               PT Ortho       18 1400    Subjective Comments    Subjective Comments Pn slowly improving. Was sore after sleeping on her shoulder the other night, but improved now.  -RB    Subjective Pain    Able to rate subjective pain? yes  -RB    Pre-Treatment Pain Level 2  -RB    Post-Treatment Pain Level 2  -RB    Shoulder Girdle Palpation    Subacromial Space Right:;Tender  -RB    Shoulder Impingement/Rotator Cuff Special Tests    Ordoñez-Theron Test (RC Lesion vs. Bursitis) Positive  -RB    Empty Can Test (RC Lesion) --   strong, mild pn  -RB    Right Shoulder    Flexion AROM Deficit 170  -RB    ABduction AROM Deficit 165  -RB    External Rotation AROM Deficit 80  -RB    Internal Rotation AROM Deficit thumb to T8  -RB    Right Shoulder    Flexion Gross Movement (5/5) normal  -RB    ABduction Gross Movement (5/5) normal   pn subacromial space  -RB    Int Rotation Gross Movement (5/5) normal  -RB    Ext Rotation Gross Movement (5/5) normal  -RB    Right Elbow/Forearm    Elbow Flexion Gross Movement (5/5) normal  -RB    Elbow Extension Gross Movement (5/5) normal  -RB      User Key  (r)  = Recorded By, (t) = Taken By, (c) = Cosigned By    Initials Name Provider Type    COLETTE Crespo, PT Physical Therapist                            PT Assessment/Plan       02/06/18 1457       PT Assessment    Assessment Comments Pt demo improved elevation AROM today, though still painful at end range. She exhibits 5/5 strength throughout the shoulder but continues to report pn w/ abduction. Empty can testing now strong and w/ minimal pn, whereas was weak and significantly painful before. Pt follows up w/ MD tomorrow.  -RB     PT Plan    PT Plan Comments Will await MD recommendation, plan to continue PT w/ focus on scapular stabilization/rotator cuff strengthening, capsular mobility  -RB       User Key  (r) = Recorded By, (t) = Taken By, (c) = Cosigned By    Initials Name Provider Type    COLETTE Crespo, PT Physical Therapist                    Exercises       02/06/18 1400          Subjective Comments    Subjective Comments Pn slowly improving. Was sore after sleeping on her shoulder the other night, but improved now.  -RB      Subjective Pain    Able to rate subjective pain? yes  -RB      Pre-Treatment Pain Level 2  -RB      Post-Treatment Pain Level 2  -RB      Exercise 1    Exercise Name 1 Continued w/ ther ex in clinic as per flow sheet. Increased resistance w/ supported 90/90 ER and scaption. Issued MD report.  -RB      Time (Minutes) 1 30  -RB        User Key  (r) = Recorded By, (t) = Taken By, (c) = Cosigned By    Initials Name Provider Type    CLOETTE Crespo, PT Physical Therapist                               PT OP Goals       02/06/18 1459 02/06/18 1400    PT Short Term Goals    STG Date to Achieve  11/27/17  -RB    STG 1  Pt to be compliant w/ initial HEP for flexibility and strengthening  -RB    STG 1 Progress  Met  -RB    STG 2  Pt to demo full AROM of the R shoulder w/ minimal to no pn.  -RB    STG 2 Progress  Partially Met  -RB    STG 3  Pt to report pn w/ daily activities no greater than  4/10.  -RB    STG 3 Progress  Met  -RB    Long Term Goals    LTG Date to Achieve  12/18/17  -RB    LTG 1  Pt to be independent w/ long term HEP for strengthening, flexibility, symptom mgmt  -RB    LTG 1 Progress  Ongoing  -RB    LTG 2  Pt to improve R shoulder/elbow strength to at least 4+/5 in all planes w/ minimal to no pn upon testing.  -RB    LTG 2 Progress  Met  -RB    LTG 3  Pt to improve QD score by at least 20% to reflect improved pn and function.  -RB    LTG 3 Progress  Met  -RB    Time Calculation    PT Goal Re-Cert Due Date 05/07/18  -RB 05/07/18  -RB      User Key  (r) = Recorded By, (t) = Taken By, (c) = Cosigned By    Initials Name Provider Type    RB Collette Crespo, PT Physical Therapist                         Time Calculation:   Start Time: 1425  Total Timed Code Minutes- PT: 30 minute(s)    Therapy Charges for Today     Code Description Service Date Service Provider Modifiers Qty    72296564893 HC PT THER PROC EA 15 MIN 2/6/2018 Collette Crespo, PT GP 2                    Collette Crespo, PT  2/6/2018

## 2018-02-07 ENCOUNTER — OFFICE VISIT (OUTPATIENT)
Dept: ORTHOPEDIC SURGERY | Facility: CLINIC | Age: 33
End: 2018-02-07

## 2018-02-07 VITALS
WEIGHT: 225.97 LBS | DIASTOLIC BLOOD PRESSURE: 82 MMHG | HEIGHT: 60 IN | HEART RATE: 78 BPM | BODY MASS INDEX: 44.36 KG/M2 | SYSTOLIC BLOOD PRESSURE: 120 MMHG

## 2018-02-07 DIAGNOSIS — M75.91 SUPRASPINATUS TENDINITIS, RIGHT: Primary | ICD-10-CM

## 2018-02-07 PROCEDURE — 99212 OFFICE O/P EST SF 10 MIN: CPT | Performed by: ORTHOPAEDIC SURGERY

## 2018-02-07 NOTE — PROGRESS NOTES
[facility] Orthopaedic Surgery Clinic Note    Subjective     Chief Complaint   Patient presents with   • Right Shoulder - Follow-up     3 week follow up        HPI      Andreina Segovia is a 32 y.o. female. She is follow-up right shoulder pain after flu vaccine October 17, 2017.  She is a nurse here at [employer].  She says she is getting better.  Her pain is 2 out of 10 aching and throbbing.  She tried anti-inflammatories physical therapy and an injection        Past Medical History:   Diagnosis Date   • Bronchitis    • Diabetes in pregnancy    • Infectious mononucleosis    • Ovarian cyst    • Wears glasses       Past Surgical History:   Procedure Laterality Date   • COLONOSCOPY     • DIAGNOSTIC LAPAROSCOPY N/A 1/4/2017    Procedure: DIAGNOSTIC LAPAROSCOPY, LAP BILATERAL SALPINGECTOMY;  Surgeon: Kaia Humphrey MD;  Location: [facility];  Service:    • SALPINGECTOMY     • WISDOM TOOTH EXTRACTION        Family History   Problem Relation Age of Onset   • Ulcerative colitis Mother    • Hypertension Mother    • Glaucoma Father    • Osteoarthritis Father    • Cancer Other    • Diabetes Other    • Heart attack Other    • Stroke Other      Social History     Social History   • Marital status:      Spouse name: N/A   • Number of children: N/A   • Years of education: N/A     Occupational History   • Not on file.     Social History Main Topics   • Smoking status: Never Smoker   • Smokeless tobacco: Never Used   • Alcohol use Yes      Comment: 2 per week   • Drug use: No   • Sexual activity: No     Other Topics Concern   • Not on file     Social History Narrative      Current Outpatient Prescriptions on File Prior to Visit   Medication Sig Dispense Refill   • cetirizine (zyrTEC) 10 MG tablet Take 10 mg by mouth Daily.     • cholecalciferol (VITAMIN D3) 1000 UNITS tablet Take 2,000 Units by mouth Daily.     • fluticasone (FLONASE) 50 MCG/ACT nasal spray 2 sprays into each nostril Daily for 30 days. 1 bottle 0   •  "meclizine (ANTIVERT) 12.5 MG tablet Take 1 tablet by mouth 3 (Three) Times a Day As Needed for dizziness for up to 30 days. 90 tablet 0     No current facility-administered medications on file prior to visit.       Allergies   Allergen Reactions   • Adhesive Tape         The following portions of the patient's history were reviewed and updated as appropriate: allergies, current medications, past family history, past medical history, past social history, past surgical history and problem list.    Review of Systems   Constitutional: Negative.    HENT: Positive for ear pain.    Eyes: Negative.    Respiratory: Negative.    Cardiovascular: Negative.    Gastrointestinal: Negative.    Endocrine: Negative.    Genitourinary: Negative.    Musculoskeletal: Positive for arthralgias.   Skin: Negative.    Allergic/Immunologic: Negative.    Neurological: Negative.    Hematological: Negative.    Psychiatric/Behavioral: Negative.         Objective      Physical Exam  /82  Pulse 78  Ht 152.4 cm (60\")  Wt 102 kg (225 lb 15.5 oz)  LMP 01/20/2018  BMI 44.13 kg/m2    Body mass index is 44.13 kg/(m^2).        GENERAL APPEARANCE: awake, alert & oriented x 3, in no acute distress and well developed, well nourished  PSYCH: normal mood and affect      Ortho Exam  Musculoskeletal   Upper Extremity   Right Shoulder     Inspection and Palpation:     Tenderness - none    Crepitus - none    Sensation is normal    Examination reveals no ecchymosis.      Strength and Tone:    Supraspinatus - 5/5    Infraspinatus - 5/5    Subscapularis - 5/5    Deltoid - 5/5     Range of Motion   Left shoulder:    Internal Rotation: ROM - T7    External Rotation: AROM - 80 degrees    Elevation through flexion: AROM - 180 degrees    Right Shoulder:    Internal Rotation: ROM - T7    External Rotation: AROM - 80 degrees    Elevation through flexion: AROM - 180 degrees     Abduction -180 degrees     Instability   Right shoulder    Sulcus sign " negative    Apprehension test negative    Miracle relocation test negative    Jerk test negative   Impingement   Right shoulder    Orodñez impingement test positive    Neer impingement test positive   Functional Testing   Right shoulder    AC crossover adduction test negative    Abdominal compression test negative    Lift-off sign negative    Speed's test negative    Greene's test negative    Horriblower's sign negative     Assessment/Plan      Andreina was seen today for follow-up.    Diagnoses and all orders for this visit:    Supraspinatus tendinitis, right  -     Ambulatory Referral to Physical Therapy  She will continue physical therapy.  We will increase work restrictions no lifting more than 20 pounds no push pull greater than 40 pounds she'll follow-up in a month.  She's making progress.  If she plateaus we will get an MRI.    Medical Decision Making  Management Options : over-the-counter medicine and physical/occupational therapy      Gabriel David MD  02/07/18  9:28 AM

## 2018-02-20 ENCOUNTER — HOSPITAL ENCOUNTER (OUTPATIENT)
Dept: PHYSICAL THERAPY | Facility: HOSPITAL | Age: 33
Setting detail: THERAPIES SERIES
Discharge: HOME | End: 2018-02-20

## 2018-02-20 DIAGNOSIS — S49.91XA INJURY OF RIGHT SHOULDER, INITIAL ENCOUNTER: Primary | ICD-10-CM

## 2018-02-20 PROCEDURE — 97110 THERAPEUTIC EXERCISES: CPT | Performed by: PHYSICAL THERAPIST

## 2018-03-01 ENCOUNTER — HOSPITAL ENCOUNTER (OUTPATIENT)
Dept: PHYSICAL THERAPY | Facility: HOSPITAL | Age: 33
Setting detail: THERAPIES SERIES
Discharge: HOME | End: 2018-03-01

## 2018-03-01 DIAGNOSIS — S49.91XA INJURY OF RIGHT SHOULDER, INITIAL ENCOUNTER: Primary | ICD-10-CM

## 2018-03-01 PROCEDURE — 97110 THERAPEUTIC EXERCISES: CPT | Performed by: PHYSICAL THERAPIST

## 2018-03-01 NOTE — THERAPY TREATMENT NOTE
Outpatient Physical Therapy Ortho Treatment Note  [facility]     Patient Name: Andreina Segovia  : 1985  MRN: [id number]  Today's Date: 3/1/2018      Visit Date: 2018    Visit Dx:    ICD-10-CM ICD-9-CM   1. Injury of right shoulder, initial encounter S49.91XA 959.2       Patient Active Problem List   Diagnosis   • Pelvic pain   • Metatarsalgia of left foot        Past Medical History:   Diagnosis Date   • Bronchitis    • Diabetes in pregnancy    • Infectious mononucleosis    • Ovarian cyst    • Wears glasses         Past Surgical History:   Procedure Laterality Date   • COLONOSCOPY     • DIAGNOSTIC LAPAROSCOPY N/A 2017    Procedure: DIAGNOSTIC LAPAROSCOPY, LAP BILATERAL SALPINGECTOMY;  Surgeon: Kaia Humphrey MD;  [facility];  Service:    • SALPINGECTOMY     • WISDOM TOOTH EXTRACTION               PT Ortho       18 09    Subjective Comments    Subjective Comments Doing about the same. Pn minimal. Still working on HEP. Next follow up w/ Dr. David 3/12.  -RB    Subjective Pain    Able to rate subjective pain? yes  -RB    Pre-Treatment Pain Level 0  -RB    Post-Treatment Pain Level 0  -RB      User Key  (r) = Recorded By, (t) = Taken By, (c) = Cosigned By    Initials Name Provider Type    COLETTE Crespo, PT Physical Therapist                            PT Assessment/Plan       18 0950       PT Assessment    Assessment Comments Pn still minimal and ROM WFL. Pt independent w/ HEP and has met all goals established for PT. Will plan d/c pending MD recommendation at next followup.  -RB     PT Plan    PT Plan Comments Plan d/c unless MD recommends otherwise.  -RB       User Key  (r) = Recorded By, (t) = Taken By, (c) = Cosigned By    Initials Name Provider Type    COLETTE Crespo, PT Physical Therapist                    Exercises       18 0900          Subjective Comments    Subjective Comments Doing about the same. Pn minimal. Still working on HEP. Next  follow up w/ Dr. David 3/12.  -RB      Subjective Pain    Able to rate subjective pain? yes  -RB      Pre-Treatment Pain Level 0  -RB      Post-Treatment Pain Level 0  -RB      Exercise 1    Exercise Name 1 Ther ex in clinic as per flow sheet. Progressed weight and resistance as detailed. Reviewed final HEP.  -RB      Time (Minutes) 1 30  -RB        User Key  (r) = Recorded By, (t) = Taken By, (c) = Cosigned By    Initials Name Provider Type    RB Collette Crespo PT Physical Therapist                               PT OP Goals       03/01/18 0952 03/01/18 0900    PT Short Term Goals    STG Date to Achieve  11/27/17  -RB    STG 1  Pt to be compliant w/ initial HEP for flexibility and strengthening  -RB    STG 1 Progress  Met  -RB    STG 2  Pt to demo full AROM of the R shoulder w/ minimal to no pn.  -RB    STG 2 Progress  Met  -RB    STG 3  Pt to report pn w/ daily activities no greater than 4/10.  -RB    STG 3 Progress  Met  -RB    Long Term Goals    LTG Date to Achieve  12/18/17  -RB    LTG 1  Pt to be independent w/ long term HEP for strengthening, flexibility, symptom mgmt  -RB    LTG 1 Progress  Met  -RB    LTG 2  Pt to improve R shoulder/elbow strength to at least 4+/5 in all planes w/ minimal to no pn upon testing.  -RB    LTG 2 Progress  Met  -RB    LTG 3  Pt to improve QD score by at least 20% to reflect improved pn and function.  -RB    LTG 3 Progress  Met  -RB    Time Calculation    PT Goal Re-Cert Due Date 05/07/18  -RB       User Key  (r) = Recorded By, (t) = Taken By, (c) = Cosigned By    Initials Name Provider Type    RB Collette Crespo PT Physical Therapist                         Time Calculation:   Start Time: 0915  Total Timed Code Minutes- PT: 30 minute(s)    Therapy Charges for Today     Code Description Service Date Service Provider Modifiers Qty    [id number]  PT THER PROC EA 15 MIN 3/1/2018 Collette Crespo, PT GP 2                    Collette Crespo, PT  3/1/2018

## 2018-03-12 ENCOUNTER — OFFICE VISIT (OUTPATIENT)
Dept: ORTHOPEDIC SURGERY | Facility: CLINIC | Age: 33
End: 2018-03-12

## 2018-03-12 VITALS
BODY MASS INDEX: 45.01 KG/M2 | HEIGHT: 60 IN | WEIGHT: 229.28 LBS | SYSTOLIC BLOOD PRESSURE: 167 MMHG | DIASTOLIC BLOOD PRESSURE: 95 MMHG | HEART RATE: 90 BPM

## 2018-03-12 DIAGNOSIS — M75.91 SUPRASPINATUS TENDINITIS, RIGHT: Primary | ICD-10-CM

## 2018-03-12 PROCEDURE — 99212 OFFICE O/P EST SF 10 MIN: CPT | Performed by: ORTHOPAEDIC SURGERY

## 2018-03-12 NOTE — PROGRESS NOTES
[facility] Orthopaedic Surgery Clinic Note    Subjective     Chief Complaint   Patient presents with   • Right Shoulder - Follow-up     4 weeks          HPI      Andreina Segovia is a 32 y.o. female.  She had a right shoulder injury with an injection back in October.  She is a nurse at [employer].  She did do physical therapy at [facility].  Her pain is better at 2 out of 10.        Past Medical History:   Diagnosis Date   • Bronchitis    • Diabetes in pregnancy    • Infectious mononucleosis    • Ovarian cyst    • Wears glasses       Past Surgical History:   Procedure Laterality Date   • COLONOSCOPY     • DIAGNOSTIC LAPAROSCOPY N/A 1/4/2017    Procedure: DIAGNOSTIC LAPAROSCOPY, LAP BILATERAL SALPINGECTOMY;  Surgeon: Kaia Humphrey MD;  Location: [facility];  Service:    • SALPINGECTOMY     • WISDOM TOOTH EXTRACTION        Family History   Problem Relation Age of Onset   • Ulcerative colitis Mother    • Hypertension Mother    • Glaucoma Father    • Osteoarthritis Father    • Cancer Other    • Diabetes Other    • Heart attack Other    • Stroke Other      Social History     Social History   • Marital status:      Spouse name: N/A   • Number of children: N/A   • Years of education: N/A     Occupational History   • Not on file.     Social History Main Topics   • Smoking status: Never Smoker   • Smokeless tobacco: Never Used   • Alcohol use Yes      Comment: 2 per week   • Drug use: No   • Sexual activity: No     Other Topics Concern   • Not on file     Social History Narrative   • No narrative on file      Current Outpatient Prescriptions on File Prior to Visit   Medication Sig Dispense Refill   • cetirizine (zyrTEC) 10 MG tablet Take 10 mg by mouth Daily.     • cholecalciferol (VITAMIN D3) 1000 UNITS tablet Take 2,000 Units by mouth Daily.       No current facility-administered medications on file prior to visit.       Allergies   Allergen Reactions   • Adhesive Tape         The following  "portions of the patient's history were reviewed and updated as appropriate: allergies, current medications, past family history, past medical history, past social history, past surgical history and problem list.    Review of Systems   Constitutional: Negative.    HENT: Negative.    Eyes: Negative.    Respiratory: Negative.    Cardiovascular: Negative.    Gastrointestinal: Negative.    Endocrine: Negative.    Genitourinary: Negative.    Musculoskeletal: Positive for arthralgias.   Skin: Negative.    Allergic/Immunologic: Negative.    Neurological: Negative.    Hematological: Negative.    Psychiatric/Behavioral: Negative.         Objective      Physical Exam  /95   Pulse 90   Ht 152.4 cm (60\")   Wt 104 kg (229 lb 4.5 oz)   BMI 44.78 kg/m²     Body mass index is 44.78 kg/m².        GENERAL APPEARANCE: awake, alert & oriented x 3, in no acute distress and well developed, well nourished  PSYCH: normal mood and affect  Ortho Exam  Musculoskeletal   Upper Extremity   Right Shoulder     Inspection and Palpation:     Tenderness - none    Crepitus - none    Sensation is normal    Examination reveals no ecchymosis.      Strength and Tone:    Supraspinatus,  Infraspinatus - 5/5    Subscapularis - 5/5    Deltoid - 5/5     Range of Motion   Left shoulder:    Internal Rotation: ROM - T7    External Rotation: AROM - 80 degrees    Elevation through flexion: AROM - 180 degrees    Right Shoulder:    Internal Rotation: ROM - T7    External Rotation: AROM - 80 degrees    Elevation through flexion: AROM - 180 degrees     Abduction -180 degrees     Instability   Right shoulder    Sulcus sign negative    Apprehension test negative    Miracle relocation test negative    Jerk test negative   Impingement   Right shoulder    Ordoñez impingement test positive    Neer impingement test positive   Functional Testing   Right shoulder    AC crossover adduction test negative    Abdominal compression test negative    Lift-off sign " negative    Speed's test negative    Greene's test negative    Horriblower's sign negative     Assessment/Plan      Andreina was seen today for follow-up.    Diagnoses and all orders for this visit:    Supraspinatus tendinitis, right    She may return to work full duty with no restrictions.  She is at maximal medical improvement.    Medical Decision Making  Management Options : over-the-counter medicine      Gabriel David MD  03/12/18  11:01 AM

## 2018-03-20 ENCOUNTER — DOCUMENTATION (OUTPATIENT)
Dept: PHYSICAL THERAPY | Facility: HOSPITAL | Age: 33
End: 2018-03-20

## 2018-03-20 DIAGNOSIS — S49.91XA INJURY OF RIGHT SHOULDER, INITIAL ENCOUNTER: Primary | ICD-10-CM

## 2018-03-20 NOTE — THERAPY DISCHARGE NOTE
Outpatient Physical Therapy Discharge Summary         Patient Name: Andreina Segovia  : 1985  MRN: [id number]    Today's Date: 3/20/2018    Visit Dx:    ICD-10-CM ICD-9-CM   1. Injury of right shoulder, initial encounter S49.91XA 959.2             PT OP Goals     Row Name 18 1400          PT Short Term Goals    STG Date to Achieve 17  -RB     STG 1 Pt to be compliant w/ initial HEP for flexibility and strengthening  -RB     STG 1 Progress Met  -RB     STG 2 Pt to demo full AROM of the R shoulder w/ minimal to no pn.  -RB     STG 2 Progress Met  -RB     STG 3 Pt to report pn w/ daily activities no greater than 4/10.  -RB     STG 3 Progress Met  -RB        Long Term Goals    LTG Date to Achieve 17  -RB     LTG 1 Pt to be independent w/ long term HEP for strengthening, flexibility, symptom mgmt  -RB     LTG 1 Progress Met  -RB     LTG 2 Pt to improve R shoulder/elbow strength to at least 4+/5 in all planes w/ minimal to no pn upon testing.  -RB     LTG 2 Progress Met  -RB     LTG 3 Pt to improve QD score by at least 20% to reflect improved pn and function.  -RB     LTG 3 Progress Met  -RB       User Key  (r) = Recorded By, (t) = Taken By, (c) = Cosigned By    Initials Name Provider Type    RB Collette Crespo, PT Physical Therapist          OP PT Discharge Summary  Date of Discharge: 18  Reason for Discharge: All goals achieved  Outcomes Achieved: Able to achieve all goals within established timeline  Discharge Destination: Home with home program  Discharge Instructions/Additional Comments: Pt progressed well w/ PT and met all goals established. She has been d/c to independent HEP.      Time Calculation:                    Collette Crespo, PT  3/20/2018

## 2019-03-03 ENCOUNTER — HOSPITAL ENCOUNTER (EMERGENCY)
Facility: HOSPITAL | Age: 34
Discharge: HOME | End: 2019-03-03
Attending: EMERGENCY MEDICINE | Admitting: EMERGENCY MEDICINE

## 2019-03-03 VITALS
WEIGHT: 230 LBS | OXYGEN SATURATION: 92 % | TEMPERATURE: 98.1 F | DIASTOLIC BLOOD PRESSURE: 91 MMHG | HEIGHT: 59 IN | HEART RATE: 92 BPM | RESPIRATION RATE: 16 BRPM | BODY MASS INDEX: 46.37 KG/M2 | SYSTOLIC BLOOD PRESSURE: 129 MMHG

## 2019-03-03 DIAGNOSIS — T50.905A PILL ESOPHAGITIS: Primary | ICD-10-CM

## 2019-03-03 DIAGNOSIS — K20.80 PILL ESOPHAGITIS: Primary | ICD-10-CM

## 2019-03-03 PROCEDURE — 99283 EMERGENCY DEPT VISIT LOW MDM: CPT

## 2019-03-03 RX ORDER — SUCRALFATE ORAL 1 G/10ML
1 SUSPENSION ORAL
Qty: 420 ML | Refills: 0 | Status: SHIPPED | OUTPATIENT
Start: 2019-03-03 | End: 2019-03-22

## 2019-03-03 RX ORDER — ALUMINA, MAGNESIA, AND SIMETHICONE 2400; 2400; 240 MG/30ML; MG/30ML; MG/30ML
15 SUSPENSION ORAL ONCE
Status: COMPLETED | OUTPATIENT
Start: 2019-03-03 | End: 2019-03-03

## 2019-03-03 RX ADMIN — LIDOCAINE HYDROCHLORIDE 15 ML: 20 SOLUTION ORAL; TOPICAL at 09:53

## 2019-03-03 RX ADMIN — ALUMINUM HYDROXIDE, MAGNESIUM HYDROXIDE, AND DIMETHICONE 15 ML: 400; 400; 40 SUSPENSION ORAL at 09:53

## 2019-03-03 NOTE — DISCHARGE INSTRUCTIONS
Please call ASAP to arrange outpatient follow up with one of the following gastroenterologists:    Wyatt Cool MD or Vlad Cruz MD  [address]  [place] [id number] [phone]    Or    If unable to make a timely appointment with Dr. Cool or Dr. Cruz, please follow up with one of these gastroenterologists:    Clyde Ortega MD or Corey Chandler MD  [address]  [place] [address]  [phone]    If follow up with these specialists cannot be arranged soon, please also follow up with a primary care provider, next available.

## 2019-03-03 NOTE — ED PROVIDER NOTES
Subjective   Patient presents to the emergency department today complaining of having a pill stuck in her esophagus.  Patient reports she got off work at about 7 AM this morning.  She took a pill just prior to clocking out.  Patient reports it has been taking clindamycin and it felt like it got stuck in her esophagus.  Patient reports that she has drank several glasses of water even warm fluids as well as bread and pudding trying to get this to resolve.  The pain is continued.  States she is able to get food and fluids down without any difficulty.  She has had a little bit of difficulty with swallowing some substances over the past couple days prior to the pill being stuck.  Patient is on clindamycin due to a abscessed tooth.  She has no other complaints she denies any shortness of breath associated with this no cough        History provided by:  Patient   used: No    Sore Throat   Sore throat location: upper esophagus.  Quality: foreign body sensation after taking Clindamycin.  Onset quality:  Sudden  Duration:  4 hours  Timing:  Constant  Progression:  Unchanged  Chronicity:  New  Relieved by:  Nothing  Worsened by:  Drinking and eating  Ineffective treatments:  None tried  Associated symptoms: no abdominal pain, no adenopathy, no chest pain, no chills, no cough, no drooling, no ear discharge, no ear pain, no night sweats, no shortness of breath, no trouble swallowing and no voice change        Review of Systems   Constitutional: Negative for chills and night sweats.   HENT: Positive for sore throat. Negative for drooling, ear discharge, ear pain, trouble swallowing and voice change.    Respiratory: Negative for cough, chest tightness, shortness of breath and wheezing.    Cardiovascular: Negative for chest pain.   Gastrointestinal: Negative for abdominal pain, nausea and vomiting.   Hematological: Negative for adenopathy.   Psychiatric/Behavioral: Negative.    All other systems reviewed and are  negative.      Past Medical History:   Diagnosis Date   • Bronchitis    • Diabetes in pregnancy    • Infectious mononucleosis    • Ovarian cyst    • Wears glasses        Allergies   Allergen Reactions   • Adhesive Tape        Past Surgical History:   Procedure Laterality Date   • COLONOSCOPY     • DIAGNOSTIC LAPAROSCOPY N/A 1/4/2017    Procedure: DIAGNOSTIC LAPAROSCOPY, LAP BILATERAL SALPINGECTOMY;  Surgeon: Kaia Humphrey MD;  Location: [facility];  Service:    • SALPINGECTOMY     • WISDOM TOOTH EXTRACTION         Family History   Problem Relation Age of Onset   • Ulcerative colitis Mother    • Hypertension Mother    • Glaucoma Father    • Osteoarthritis Father    • Cancer Other    • Diabetes Other    • Heart attack Other    • Stroke Other        Social History     Socioeconomic History   • Marital status:      Spouse name: Not on file   • Number of children: Not on file   • Years of education: Not on file   • Highest education level: Not on file   Tobacco Use   • Smoking status: Never Smoker   • Smokeless tobacco: Never Used   Substance and Sexual Activity   • Alcohol use: Yes     Comment: 2 per week   • Drug use: No   • Sexual activity: No           Objective   Physical Exam   Constitutional: She is oriented to person, place, and time. She appears well-developed and well-nourished.   HENT:   Head: Normocephalic and atraumatic.   Nose: Nose normal.   Mouth/Throat: Oropharynx is clear and moist. No oropharyngeal exudate.   Eyes: Conjunctivae are normal. No scleral icterus.   Neck: Normal range of motion. No thyromegaly present.   Cardiovascular: Normal rate, regular rhythm and normal heart sounds.   Pulmonary/Chest: Effort normal and breath sounds normal. No respiratory distress. She has no wheezes. She has no rales. She exhibits no tenderness.   Abdominal: Soft. Bowel sounds are normal. She exhibits no distension. There is no tenderness.   Musculoskeletal: Normal range of motion.   Lymphadenopathy:      She has no cervical adenopathy.   Neurological: She is alert and oriented to person, place, and time. She has normal reflexes.   Skin: Skin is warm and dry.   Psychiatric: She has a normal mood and affect. Her behavior is normal. Judgment and thought content normal.   Nursing note and vitals reviewed.      Procedures           ED Course  ED Course as of Mar 07 0720   Sun Mar 03, 2019   1000 Patient is able to eat and drink without difficulty.  Discussed with her the spine irritation due to the clindamycin.  We will give her GI cocktail here start her on some Carafate have her follow-up with her dentist and family care provider.     [BLADE]      ED Course User Index  [BLADE] Raghav Oden PA                  [facility]        Final diagnoses:   Pill esophagitis            Raghav Oden PA  03/07/19 0720

## 2019-03-06 ENCOUNTER — OFFICE VISIT (OUTPATIENT)
Dept: GASTROENTEROLOGY | Facility: CLINIC | Age: 34
End: 2019-03-06

## 2019-03-06 ENCOUNTER — LAB (OUTPATIENT)
Dept: LAB | Facility: HOSPITAL | Age: 34
End: 2019-03-06

## 2019-03-06 VITALS
WEIGHT: 234 LBS | HEART RATE: 70 BPM | TEMPERATURE: 97.9 F | BODY MASS INDEX: 47.17 KG/M2 | HEIGHT: 59 IN | SYSTOLIC BLOOD PRESSURE: 110 MMHG | DIASTOLIC BLOOD PRESSURE: 70 MMHG | OXYGEN SATURATION: 99 %

## 2019-03-06 DIAGNOSIS — K21.9 CHRONIC GERD: ICD-10-CM

## 2019-03-06 DIAGNOSIS — T50.905A PILL ESOPHAGITIS: ICD-10-CM

## 2019-03-06 DIAGNOSIS — E66.01 MORBID OBESITY WITH BMI OF 45.0-49.9, ADULT (HCC): ICD-10-CM

## 2019-03-06 DIAGNOSIS — T50.905A PILL ESOPHAGITIS: Primary | ICD-10-CM

## 2019-03-06 DIAGNOSIS — K20.80 PILL ESOPHAGITIS: Primary | ICD-10-CM

## 2019-03-06 DIAGNOSIS — K20.80 PILL ESOPHAGITIS: ICD-10-CM

## 2019-03-06 DIAGNOSIS — K57.30 DIVERTICULOSIS OF COLON: ICD-10-CM

## 2019-03-06 PROCEDURE — 99214 OFFICE O/P EST MOD 30 MIN: CPT | Performed by: NURSE PRACTITIONER

## 2019-03-06 PROCEDURE — 83013 H PYLORI (C-13) BREATH: CPT

## 2019-03-06 NOTE — H&P (VIEW-ONLY)
GASTROENTEROLOGY OUTPATIENT ESTABLISHED PATIENT NOTE  Patient: ANDREINA TORRES : 1985  Date of Service: 2019  CC: Follow-up    Assessment/Plan                                             ASSESSMENT & PLANS     Pill esophagitis  Chronic GERD  -     H. Pylori Breath Test - Breath, Lung; Future  -     Esophagogastroduodenoscopy; Future w/ Dr Cool, per pt's request.  EGD to be done in the hospital d/t high BMI  · Continue Carafate suspension    Morbid obesity with BMI of 45.0-49.9, adult (CMS/HCC)    Diverticulosis of colon Per colonoscopy in 2016 by Dr. Cool  · High-fiber diet     Follow Up: PRN    DISCUSSION: The above plan was delineated in details with patient and all questions and concerns were answered.  Patient is also given contact information.  Patient is to return as scheduled or sooner if new problems arise.   Subjective                                                     SUBJECTIVE   History of Present Illness  Ms. Andreina Torres is a 33 y.o. female who is here for Follow-up  Patient was recently in the ER Tomy 3/3/2019 for dysphagia after swallowing clindamycin pill, taken for tooth abscess.  Patient did not have pill impaction.  Patient was discharged from the ER with Carafate suspension.  Pt was on Amoxicillin prior to taking clindamycin and did not have any trouble with swallowing.  Clindamycin was given after Amoxicillin due to persistence of dental problem. Pt fiinished Clindamycin course this past Monday 3/4/2019.  No recurrence of dysphagia since ER visit. No dysphagia or odynopha to solids or liquids or other pills.    history of chronic GERD with intermittent symptoms of reflux in the past.  She has taking Nexium and Protonix in the past, but not recently.  She never had an EGD done.  No prior history of peptic ulcer or H. pylori infection  1-2 drinking of ETOh a wk. Never smoker. NSAIDS once a wk a most.  Her weight has been relatively stable with 5 pound  fluctuations.  Patient has morbid obesity with BMI of 47  No change in bowel habits. Pt denies dark black stools or bright red blood in the stools, in the toilet bowl, or on the toilet tissue.  No diarrhea or constipation.  Stool character and consistency have been normal.    Colonoscopy, done on 10/19/2016 by Dr. Cool due to left lower quadrant abdominal pain and diarrhea, showed cecal diverticulosis, but otherwise normal colonoscopy to the terminal ileum.  Random colon biopsy showed mild proprial edema, but no histologic abnormality.     ROS:Review of Systems   Constitutional: Negative.         Pt currently or recently takes NSAIDS ( (i.e Ibuprofen, Aleve, Advil, Exedrin, BC Powder, diclofenac, meloxicam, & Naproxen, etc)? Yes    Pt currently or recently takes abx? Yes   HENT: Positive for trouble swallowing.    Eyes: Negative.    Respiratory: Negative.    Cardiovascular: Negative.    Gastrointestinal: Negative.    Endocrine: Negative.    Genitourinary: Negative.    Musculoskeletal: Negative.    Skin: Negative.    Allergic/Immunologic: Negative.    Neurological: Negative.    Hematological: Negative.    Psychiatric/Behavioral: Negative.    Subjective   PAST MED HX: Pt  has a past medical history of Bronchitis, Diabetes in pregnancy, Infectious mononucleosis, Ovarian cyst, and Wears glasses.  PAST SURG HX: Pt  has a past surgical history that includes [place] tooth extraction; Colonoscopy; Salpingectomy; and DIAGNOSTIC LAPAROSCOPY, LAP BILATERAL SALPINGECTOMY (N/A, 1/4/2017).  FAM HX: family history includes Cancer in her other; Diabetes in her other; Glaucoma in her father; Heart attack in her other; Hypertension in her mother; Osteoarthritis in her father; Stroke in her other; Ulcerative colitis in her mother.  SOC HX: Pt  reports that  has never smoked. she has never used smokeless tobacco. She reports that she drinks alcohol. She reports that she does not use drugs.    Objective                                                            OBJECTIVE   Allergy: Pt is allergic to adhesive tape.  MEDS:•  cetirizine (zyrTEC) 10 MG tablet, Take 10 mg by mouth Daily., Disp: , Rfl:   •  cholecalciferol (VITAMIN D3) 1000 UNITS tablet, Take 2,000 Units by mouth Daily., Disp: , Rfl:   •  sucralfate (CARAFATE) 1 GM/10ML suspension, Take 10 mL by mouth 4 (Four) Times a Day With Meals & at Bedtime., Disp: 420 mL, Rfl: 0  Lab Results   Component Value Date    WBC 8.41 01/15/2017    WBC 7.31 12/27/2016    WBC 8.21 02/22/2016    EOSABS 0.16 01/15/2017    EOSABS 0.13 02/22/2016    EOSABS 0.21 10/07/2015    HGB 12.8 01/15/2017    HGB 13.7 12/27/2016    HGB 10.8 (L) 02/22/2016    HCT 38.8 01/15/2017    HCT 40.6 12/27/2016    HCT 34.2 (L) 02/22/2016    MCV 89.6 01/15/2017    MCV 87.9 12/27/2016    MCV 83.6 02/22/2016    MCHC 33.0 01/15/2017    MCHC 33.7 12/27/2016    MCHC 31.6 (L) 02/22/2016     01/15/2017     12/27/2016     02/22/2016     Lab Results   Component Value Date     01/15/2017    K 4.1 01/15/2017     01/15/2017    CO2 25.0 01/15/2017    BUN 9 01/15/2017    BUN 13 12/27/2016    BUN 18 02/22/2016    CREATININE 0.70 01/15/2017    CREATININE 0.60 12/27/2016    CREATININE 0.6 02/22/2016    EGFRIFNONA 96 01/15/2017    EGFRIFNONA 115 12/27/2016    GLUCOSE 105 (H) 01/15/2017    CALCIUM 10.1 01/15/2017    ANIONGAP 14.0 (H) 01/15/2017     Lab Results   Component Value Date    AST 20 01/15/2017    AST 23 02/22/2016    ALT 20 01/15/2017    ALT 20 02/22/2016    ALKPHOS 114 (H) 01/15/2017    ALKPHOS 123 (H) 02/22/2016    BILITOT 0.2 (L) 01/15/2017    BILITOT 0.3 02/22/2016    ALBUMIN 4.30 01/15/2017    ALBUMIN 4.4 02/22/2016     Lab Results   Component Value Date    LIPASE 41 02/22/2016     Lab Results   Component Value Date    HGBA1C 5.50 10/18/2016    TSH 1.268 10/07/2015     Lab Results   Component Value Date    INR 0.92 01/15/2017     Wt Readings from Last 5 Encounters:   03/06/19 106 kg (234 lb)   03/03/19  104 kg (230 lb)   08/15/18 97.5 kg (215 lb)   08/08/18 97.5 kg (215 lb)   03/12/18 104 kg (229 lb 4.5 oz)   body mass index is 47.26 kg/m².,Temp: 97.9 °F (36.6 °C),BP: 110/70,Heart Rate: 70   Physical Exam  General Well developed; well nourished; no acute distress.   ENT Oral mucosa pink and moist without thrush or lesions.    GI Abd soft, NT, ND, normal active bowel sounds.  No HSM.  No abd hernia    Pt care team: Shari DESAI & [place] Salguero  03/06/19 9:06 AM  [facility]--Gastroenterology  [phone]    CC: , Deepti ODONNELL MD   [address] / [place] [address] FAX:[phone]

## 2019-03-06 NOTE — PROGRESS NOTES
GASTROENTEROLOGY OUTPATIENT ESTABLISHED PATIENT NOTE  Patient: ANDREINA TORRES : 1985  Date of Service: 2019  CC: Follow-up    Assessment/Plan                                             ASSESSMENT & PLANS     Pill esophagitis  Chronic GERD  -     H. Pylori Breath Test - Breath, Lung; Future  -     Esophagogastroduodenoscopy; Future w/ Dr Cool, per pt's request.  EGD to be done in the hospital d/t high BMI  · Continue Carafate suspension    Morbid obesity with BMI of 45.0-49.9, adult (CMS/HCC)    Diverticulosis of colon Per colonoscopy in 2016 by Dr. Cool  · High-fiber diet     Follow Up: PRN    DISCUSSION: The above plan was delineated in details with patient and all questions and concerns were answered.  Patient is also given contact information.  Patient is to return as scheduled or sooner if new problems arise.   Subjective                                                     SUBJECTIVE   History of Present Illness  Ms. Andreina Torres is a 33 y.o. female who is here for Follow-up  Patient was recently in the ER Tomy 3/3/2019 for dysphagia after swallowing clindamycin pill, taken for tooth abscess.  Patient did not have pill impaction.  Patient was discharged from the ER with Carafate suspension.  Pt was on Amoxicillin prior to taking clindamycin and did not have any trouble with swallowing.  Clindamycin was given after Amoxicillin due to persistence of dental problem. Pt fiinished Clindamycin course this past Monday 3/4/2019.  No recurrence of dysphagia since ER visit. No dysphagia or odynopha to solids or liquids or other pills.    history of chronic GERD with intermittent symptoms of reflux in the past.  She has taking Nexium and Protonix in the past, but not recently.  She never had an EGD done.  No prior history of peptic ulcer or H. pylori infection  1-2 drinking of ETOh a wk. Never smoker. NSAIDS once a wk a most.  Her weight has been relatively stable with 5 pound  fluctuations.  Patient has morbid obesity with BMI of 47  No change in bowel habits. Pt denies dark black stools or bright red blood in the stools, in the toilet bowl, or on the toilet tissue.  No diarrhea or constipation.  Stool character and consistency have been normal.    Colonoscopy, done on 10/19/2016 by Dr. Cool due to left lower quadrant abdominal pain and diarrhea, showed cecal diverticulosis, but otherwise normal colonoscopy to the terminal ileum.  Random colon biopsy showed mild proprial edema, but no histologic abnormality.     ROS:Review of Systems   Constitutional: Negative.         Pt currently or recently takes NSAIDS ( (i.e Ibuprofen, Aleve, Advil, Exedrin, BC Powder, diclofenac, meloxicam, & Naproxen, etc)? Yes    Pt currently or recently takes abx? Yes   HENT: Positive for trouble swallowing.    Eyes: Negative.    Respiratory: Negative.    Cardiovascular: Negative.    Gastrointestinal: Negative.    Endocrine: Negative.    Genitourinary: Negative.    Musculoskeletal: Negative.    Skin: Negative.    Allergic/Immunologic: Negative.    Neurological: Negative.    Hematological: Negative.    Psychiatric/Behavioral: Negative.    Subjective   PAST MED HX: Pt  has a past medical history of Bronchitis, Diabetes in pregnancy, Infectious mononucleosis, Ovarian cyst, and Wears glasses.  PAST SURG HX: Pt  has a past surgical history that includes [place] tooth extraction; Colonoscopy; Salpingectomy; and DIAGNOSTIC LAPAROSCOPY, LAP BILATERAL SALPINGECTOMY (N/A, 1/4/2017).  FAM HX: family history includes Cancer in her other; Diabetes in her other; Glaucoma in her father; Heart attack in her other; Hypertension in her mother; Osteoarthritis in her father; Stroke in her other; Ulcerative colitis in her mother.  SOC HX: Pt  reports that  has never smoked. she has never used smokeless tobacco. She reports that she drinks alcohol. She reports that she does not use drugs.    Objective                                                            OBJECTIVE   Allergy: Pt is allergic to adhesive tape.  MEDS:•  cetirizine (zyrTEC) 10 MG tablet, Take 10 mg by mouth Daily., Disp: , Rfl:   •  cholecalciferol (VITAMIN D3) 1000 UNITS tablet, Take 2,000 Units by mouth Daily., Disp: , Rfl:   •  sucralfate (CARAFATE) 1 GM/10ML suspension, Take 10 mL by mouth 4 (Four) Times a Day With Meals & at Bedtime., Disp: 420 mL, Rfl: 0  Lab Results   Component Value Date    WBC 8.41 01/15/2017    WBC 7.31 12/27/2016    WBC 8.21 02/22/2016    EOSABS 0.16 01/15/2017    EOSABS 0.13 02/22/2016    EOSABS 0.21 10/07/2015    HGB 12.8 01/15/2017    HGB 13.7 12/27/2016    HGB 10.8 (L) 02/22/2016    HCT 38.8 01/15/2017    HCT 40.6 12/27/2016    HCT 34.2 (L) 02/22/2016    MCV 89.6 01/15/2017    MCV 87.9 12/27/2016    MCV 83.6 02/22/2016    MCHC 33.0 01/15/2017    MCHC 33.7 12/27/2016    MCHC 31.6 (L) 02/22/2016     01/15/2017     12/27/2016     02/22/2016     Lab Results   Component Value Date     01/15/2017    K 4.1 01/15/2017     01/15/2017    CO2 25.0 01/15/2017    BUN 9 01/15/2017    BUN 13 12/27/2016    BUN 18 02/22/2016    CREATININE 0.70 01/15/2017    CREATININE 0.60 12/27/2016    CREATININE 0.6 02/22/2016    EGFRIFNONA 96 01/15/2017    EGFRIFNONA 115 12/27/2016    GLUCOSE 105 (H) 01/15/2017    CALCIUM 10.1 01/15/2017    ANIONGAP 14.0 (H) 01/15/2017     Lab Results   Component Value Date    AST 20 01/15/2017    AST 23 02/22/2016    ALT 20 01/15/2017    ALT 20 02/22/2016    ALKPHOS 114 (H) 01/15/2017    ALKPHOS 123 (H) 02/22/2016    BILITOT 0.2 (L) 01/15/2017    BILITOT 0.3 02/22/2016    ALBUMIN 4.30 01/15/2017    ALBUMIN 4.4 02/22/2016     Lab Results   Component Value Date    LIPASE 41 02/22/2016     Lab Results   Component Value Date    HGBA1C 5.50 10/18/2016    TSH 1.268 10/07/2015     Lab Results   Component Value Date    INR 0.92 01/15/2017     Wt Readings from Last 5 Encounters:   03/06/19 106 kg (234 lb)   03/03/19  104 kg (230 lb)   08/15/18 97.5 kg (215 lb)   08/08/18 97.5 kg (215 lb)   03/12/18 104 kg (229 lb 4.5 oz)   body mass index is 47.26 kg/m².,Temp: 97.9 °F (36.6 °C),BP: 110/70,Heart Rate: 70   Physical Exam  General Well developed; well nourished; no acute distress.   ENT Oral mucosa pink and moist without thrush or lesions.    GI Abd soft, NT, ND, normal active bowel sounds.  No HSM.  No abd hernia    Pt care team: Shari DESAI & [place] Salguero  03/06/19 9:06 AM  [facility]--Gastroenterology  [phone]    CC: , Deepti ODONNELL MD   [address] / [place] [address] FAX:[phone]

## 2019-03-06 NOTE — PATIENT INSTRUCTIONS
Helicobacter Pylori Antibodies Test  Why am I having this test?  This is a blood test that looks for bacteria called Helicobacter pylori (H. pylori). H. pylori is a germ that can be found in the cells that line the stomach. Having high levels of H. pylori in your stomach puts you at risk for stomach ulcers and small bowel ulcers, long-term (chronic) inflammation of the lining of the stomach, or even ulcers that may occur in the canal that runs from the mouth to the stomach (esophagus). Presence of H. pylori can also increase your risk for stomach cancer if it is left untreated.  Most people with H. pylori in their stomach bacteria have no symptoms. Your health care provider may ask you to have this test if you have symptoms of a stomach ulcer or small bowel ulcer, such as stomach pain before or after eating, heartburn, or nausea repeatedly after eating.  What kind of sample is taken?  A blood sample is required for this test. It is usually collected by inserting a needle into a vein or sticking a finger with a small needle.  How do I prepare for this test?  There is no preparation required for this test.  What are the reference ranges?  Reference ranges are considered healthy ranges established after testing a large group of healthy people. Reference ranges may vary among different people, labs, and hospitals. It is your responsibility to obtain your test results. Ask the lab or department performing the test when and how you will get your results.  Your test results will be reported as positive, negative, or equivocal. Equivocal means that your results are neither positive nor negative.  References ranges for these results are as follows:  · Less than or equal to 30 units/mL. This is negative.  · Greater than or equal to 40 units/mL. This is positive.  · 30.01-39.99 units/mL. This is equivocal.    What do the results mean?  Test results that are higher than normal may indicate numerous health conditions. These may  include:  · Short-term or long-term irritation of the stomach lining (gastritis).  · Small bowel ulcer.  · Stomach ulcer.  · Stomach cancer.    Talk with your health care provider to discuss your results, treatment options, and if necessary, the need for more tests. Talk with your health care provider if you have any questions about your results.  Talk with your health care provider to discuss your results, treatment options, and if necessary, the need for more tests. Talk with your health care provider if you have any questions about your results.  This information is not intended to replace advice given to you by your health care provider. Make sure you discuss any questions you have with your health care provider.  Document Released: 01/11/2006 Document Revised: 08/23/2017 Document Reviewed: 05/01/2015  Elsevier Interactive Patient Education © 2018 [employer]

## 2019-03-07 LAB — UREA BREATH TEST QL: NEGATIVE

## 2019-03-21 ENCOUNTER — ANESTHESIA EVENT (OUTPATIENT)
Dept: GASTROENTEROLOGY | Facility: HOSPITAL | Age: 34
End: 2019-03-21

## 2019-03-21 ENCOUNTER — ANESTHESIA (OUTPATIENT)
Dept: GASTROENTEROLOGY | Facility: HOSPITAL | Age: 34
End: 2019-03-21

## 2019-03-21 ENCOUNTER — HOSPITAL ENCOUNTER (OUTPATIENT)
Facility: HOSPITAL | Age: 34
Setting detail: HOSPITAL OUTPATIENT SURGERY
Discharge: HOME | End: 2019-03-21
Attending: INTERNAL MEDICINE | Admitting: INTERNAL MEDICINE

## 2019-03-21 VITALS
SYSTOLIC BLOOD PRESSURE: 101 MMHG | BODY MASS INDEX: 44.96 KG/M2 | DIASTOLIC BLOOD PRESSURE: 72 MMHG | TEMPERATURE: 97.2 F | WEIGHT: 229 LBS | RESPIRATION RATE: 18 BRPM | HEIGHT: 60 IN | OXYGEN SATURATION: 98 % | HEART RATE: 71 BPM

## 2019-03-21 DIAGNOSIS — E66.01 MORBID OBESITY WITH BMI OF 45.0-49.9, ADULT (HCC): ICD-10-CM

## 2019-03-21 DIAGNOSIS — K20.80 PILL ESOPHAGITIS: ICD-10-CM

## 2019-03-21 DIAGNOSIS — T50.905A PILL ESOPHAGITIS: ICD-10-CM

## 2019-03-21 DIAGNOSIS — K21.9 CHRONIC GERD: ICD-10-CM

## 2019-03-21 LAB
B-HCG UR QL: NEGATIVE
INTERNAL NEGATIVE CONTROL: NORMAL
INTERNAL POSITIVE CONTROL: POSITIVE
Lab: NORMAL

## 2019-03-21 PROCEDURE — 88305 TISSUE EXAM BY PATHOLOGIST: CPT | Performed by: INTERNAL MEDICINE

## 2019-03-21 PROCEDURE — 88342 IMHCHEM/IMCYTCHM 1ST ANTB: CPT | Performed by: INTERNAL MEDICINE

## 2019-03-21 PROCEDURE — 25010000002 PROPOFOL 10 MG/ML EMULSION: Performed by: NURSE ANESTHETIST, CERTIFIED REGISTERED

## 2019-03-21 PROCEDURE — 81025 URINE PREGNANCY TEST: CPT | Performed by: ANESTHESIOLOGY

## 2019-03-21 RX ORDER — SODIUM CHLORIDE, SODIUM LACTATE, POTASSIUM CHLORIDE, CALCIUM CHLORIDE 600; 310; 30; 20 MG/100ML; MG/100ML; MG/100ML; MG/100ML
20 INJECTION, SOLUTION INTRAVENOUS CONTINUOUS
Status: CANCELLED | OUTPATIENT
Start: 2019-03-21

## 2019-03-21 RX ORDER — SODIUM CHLORIDE 0.9 % (FLUSH) 0.9 %
3-10 SYRINGE (ML) INJECTION AS NEEDED
Status: DISCONTINUED | OUTPATIENT
Start: 2019-03-21 | End: 2019-03-21 | Stop reason: HOSPADM

## 2019-03-21 RX ORDER — FAMOTIDINE 10 MG/ML
20 INJECTION, SOLUTION INTRAVENOUS ONCE
Status: COMPLETED | OUTPATIENT
Start: 2019-03-21 | End: 2019-03-21

## 2019-03-21 RX ORDER — FAMOTIDINE 20 MG/1
20 TABLET, FILM COATED ORAL ONCE
Status: DISCONTINUED | OUTPATIENT
Start: 2019-03-21 | End: 2019-03-21 | Stop reason: HOSPADM

## 2019-03-21 RX ORDER — LIDOCAINE HYDROCHLORIDE 10 MG/ML
INJECTION, SOLUTION INFILTRATION; PERINEURAL AS NEEDED
Status: DISCONTINUED | OUTPATIENT
Start: 2019-03-21 | End: 2019-03-21 | Stop reason: SURG

## 2019-03-21 RX ORDER — PROPOFOL 10 MG/ML
VIAL (ML) INTRAVENOUS AS NEEDED
Status: DISCONTINUED | OUTPATIENT
Start: 2019-03-21 | End: 2019-03-21 | Stop reason: SURG

## 2019-03-21 RX ORDER — LIDOCAINE HYDROCHLORIDE 10 MG/ML
0.5 INJECTION, SOLUTION EPIDURAL; INFILTRATION; INTRACAUDAL; PERINEURAL ONCE AS NEEDED
Status: DISCONTINUED | OUTPATIENT
Start: 2019-03-21 | End: 2019-03-21 | Stop reason: HOSPADM

## 2019-03-21 RX ORDER — FENTANYL CITRATE 50 UG/ML
50 INJECTION, SOLUTION INTRAMUSCULAR; INTRAVENOUS
Status: DISCONTINUED | OUTPATIENT
Start: 2019-03-21 | End: 2019-03-21 | Stop reason: HOSPADM

## 2019-03-21 RX ORDER — SODIUM CHLORIDE 0.9 % (FLUSH) 0.9 %
3 SYRINGE (ML) INJECTION EVERY 12 HOURS SCHEDULED
Status: DISCONTINUED | OUTPATIENT
Start: 2019-03-21 | End: 2019-03-21 | Stop reason: HOSPADM

## 2019-03-21 RX ORDER — ONDANSETRON 2 MG/ML
4 INJECTION INTRAMUSCULAR; INTRAVENOUS ONCE AS NEEDED
Status: DISCONTINUED | OUTPATIENT
Start: 2019-03-21 | End: 2019-03-21 | Stop reason: HOSPADM

## 2019-03-21 RX ORDER — DEXAMETHASONE SODIUM PHOSPHATE 4 MG/ML
8 INJECTION, SOLUTION INTRA-ARTICULAR; INTRALESIONAL; INTRAMUSCULAR; INTRAVENOUS; SOFT TISSUE ONCE AS NEEDED
Status: DISCONTINUED | OUTPATIENT
Start: 2019-03-21 | End: 2019-03-21 | Stop reason: HOSPADM

## 2019-03-21 RX ORDER — SODIUM CHLORIDE, SODIUM LACTATE, POTASSIUM CHLORIDE, CALCIUM CHLORIDE 600; 310; 30; 20 MG/100ML; MG/100ML; MG/100ML; MG/100ML
9 INJECTION, SOLUTION INTRAVENOUS CONTINUOUS
Status: DISCONTINUED | OUTPATIENT
Start: 2019-03-21 | End: 2019-03-21 | Stop reason: HOSPADM

## 2019-03-21 RX ADMIN — FAMOTIDINE 20 MG: 10 INJECTION, SOLUTION INTRAVENOUS at 08:10

## 2019-03-21 RX ADMIN — PROPOFOL 50 MG: 10 INJECTION, EMULSION INTRAVENOUS at 08:19

## 2019-03-21 RX ADMIN — PROPOFOL 50 MG: 10 INJECTION, EMULSION INTRAVENOUS at 08:22

## 2019-03-21 RX ADMIN — SODIUM CHLORIDE, POTASSIUM CHLORIDE, SODIUM LACTATE AND CALCIUM CHLORIDE 9 ML/HR: 600; 310; 30; 20 INJECTION, SOLUTION INTRAVENOUS at 08:09

## 2019-03-21 RX ADMIN — PROPOFOL 50 MG: 10 INJECTION, EMULSION INTRAVENOUS at 08:23

## 2019-03-21 RX ADMIN — PROPOFOL 50 MG: 10 INJECTION, EMULSION INTRAVENOUS at 08:18

## 2019-03-21 RX ADMIN — PROPOFOL 50 MG: 10 INJECTION, EMULSION INTRAVENOUS at 08:21

## 2019-03-21 RX ADMIN — SODIUM CHLORIDE, POTASSIUM CHLORIDE, SODIUM LACTATE AND CALCIUM CHLORIDE: 600; 310; 30; 20 INJECTION, SOLUTION INTRAVENOUS at 08:05

## 2019-03-21 RX ADMIN — LIDOCAINE HYDROCHLORIDE 100 MG: 10 INJECTION, SOLUTION INFILTRATION; PERINEURAL at 08:20

## 2019-03-21 RX ADMIN — PROPOFOL 50 MG: 10 INJECTION, EMULSION INTRAVENOUS at 08:25

## 2019-03-21 RX ADMIN — PROPOFOL 50 MG: 10 INJECTION, EMULSION INTRAVENOUS at 08:20

## 2019-03-21 NOTE — ANESTHESIA POSTPROCEDURE EVALUATION
Patient: Andreina Brady Segovia    Procedure Summary     Date:  03/21/19 Room / Location:   [facility]    Anesthesia Start:  0817 Anesthesia Stop:  0836    Procedure:  EGD w/ Dr Ontiveros (N/A ) Diagnosis:       Pill esophagitis      Chronic GERD      Morbid obesity with BMI of 45.0-49.9, adult (CMS/[facility])      (Pill esophagitis [K20.8])      (Chronic GERD [K21.9])      (Morbid obesity with BMI of 45.0-49.9, adult (CMS/[facility]) [E66.01, Z68.42])    Surgeon:  Wyatt Cool MD Provider:  Hernesto Austin MD    Anesthesia Type:  MAC ASA Status:  3          Anesthesia Type: MAC  Last vitals  BP   (!) 144/101 (03/21/19 0758)   Temp   97.8 °F (36.6 °C) (03/21/19 0758)   Pulse   81 (03/21/19 0758)   Resp   26 (03/21/19 0758)     SpO2   97 % (03/21/19 0758)     Post Anesthesia Care and Evaluation    Patient location during evaluation: PACU  Patient participation: complete - patient participated  Level of consciousness: awake and alert  Pain score: 0  Pain management: adequate  Airway patency: patent  Anesthetic complications: No anesthetic complications  PONV Status: none  Cardiovascular status: hemodynamically stable and acceptable  Respiratory status: nonlabored ventilation, acceptable and nasal cannula  Hydration status: acceptable

## 2019-03-21 NOTE — ANESTHESIA PREPROCEDURE EVALUATION
Anesthesia Evaluation     NPO Solid Status: > 8 hours  NPO Liquid Status: > 8 hours           Airway   Mallampati: II  TM distance: >3 FB  Neck ROM: full  No difficulty expected  Dental - normal exam     Pulmonary    (-) decreased breath sounds  Cardiovascular     ECG reviewed  Rhythm: regular  Rate: normal    (-) hypertension, angina, murmur, cardiac stents      Neuro/Psych  GI/Hepatic/Renal/Endo    (+) obesity,     (-) liver disease, no renal disease    Musculoskeletal     Abdominal    Substance History      OB/GYN          Other        ROS/Med Hx Other: Recent dental abscess, took clindamycin, developed esophagitis                  Anesthesia Plan    ASA 3     MAC     intravenous induction     Plan discussed with CRNA.

## 2019-03-22 ENCOUNTER — TELEPHONE (OUTPATIENT)
Dept: GASTROENTEROLOGY | Facility: CLINIC | Age: 34
End: 2019-03-22

## 2019-03-22 DIAGNOSIS — K21.00 REFLUX ESOPHAGITIS: Primary | ICD-10-CM

## 2019-03-22 RX ORDER — PANTOPRAZOLE SODIUM 40 MG/1
40 TABLET, DELAYED RELEASE ORAL EVERY MORNING
Qty: 30 TABLET | Refills: 2 | Status: SHIPPED | OUTPATIENT
Start: 2019-03-22 | End: 2019-05-28 | Stop reason: SDUPTHER

## 2019-03-22 NOTE — TELEPHONE ENCOUNTER
CALLED PATIENT AND GAVE EGD BIOPSY RESULTS. TRANSFER CALL TO [facility] TO SCHEDULE 2-3 MONTH FOLLOW UP.

## 2019-03-22 NOTE — TELEPHONE ENCOUNTER
Plt let pt know that EGD shows esophagitis.  Recommend pt to take Protonix.  Rx sent. DC Carafate    RTC in 2-3 months

## 2019-03-22 NOTE — TELEPHONE ENCOUNTER
----- Message from Wyatt Cool MD sent at 3/22/2019  8:05 AM EDT -----  Shari:  The patient had evidence of esophagitis on endoscopy yesterday.  She will need to be on a proton pump inhibitor.  There was no pharmacy information in [facility] to prescribe medication.  CASEY

## 2019-03-25 LAB
CYTO UR: NORMAL
LAB AP CASE REPORT: NORMAL
LAB AP CLINICAL INFORMATION: NORMAL
PATH REPORT.FINAL DX SPEC: NORMAL
PATH REPORT.GROSS SPEC: NORMAL

## 2019-04-23 ENCOUNTER — LAB (OUTPATIENT)
Dept: LAB | Facility: HOSPITAL | Age: 34
End: 2019-04-23

## 2019-04-23 ENCOUNTER — TRANSCRIBE ORDERS (OUTPATIENT)
Dept: LAB | Facility: HOSPITAL | Age: 34
End: 2019-04-23

## 2019-04-23 DIAGNOSIS — G93.32 CHRONIC FATIGUE SYNDROME: ICD-10-CM

## 2019-04-23 DIAGNOSIS — G93.32 CHRONIC FATIGUE SYNDROME: Primary | ICD-10-CM

## 2019-04-23 LAB
ALBUMIN SERPL-MCNC: 4 G/DL (ref 3.5–5.2)
ALBUMIN/GLOB SERPL: 1.1 G/DL
ALP SERPL-CCNC: 92 U/L (ref 39–117)
ALT SERPL W P-5'-P-CCNC: 18 U/L (ref 1–33)
ANION GAP SERPL CALCULATED.3IONS-SCNC: 12.7 MMOL/L
AST SERPL-CCNC: 20 U/L (ref 1–32)
BILIRUB SERPL-MCNC: 0.3 MG/DL (ref 0.2–1.2)
BUN BLD-MCNC: 13 MG/DL (ref 6–20)
BUN/CREAT SERPL: 17.6 (ref 7–25)
CALCIUM SPEC-SCNC: 9.5 MG/DL (ref 8.6–10.5)
CHLORIDE SERPL-SCNC: 102 MMOL/L (ref 98–107)
CHOLEST SERPL-MCNC: 174 MG/DL (ref 0–200)
CO2 SERPL-SCNC: 23.3 MMOL/L (ref 22–29)
CREAT BLD-MCNC: 0.74 MG/DL (ref 0.57–1)
GFR SERPL CREATININE-BSD FRML MDRD: 89 ML/MIN/1.73
GLOBULIN UR ELPH-MCNC: 3.6 GM/DL
GLUCOSE BLD-MCNC: 88 MG/DL (ref 65–99)
HBA1C MFR BLD: 5.57 % (ref 4.8–5.6)
HDLC SERPL-MCNC: 54 MG/DL (ref 40–60)
LDLC SERPL CALC-MCNC: 103 MG/DL (ref 0–100)
LDLC/HDLC SERPL: 1.91 {RATIO}
POTASSIUM BLD-SCNC: 4.3 MMOL/L (ref 3.5–5.2)
PROT SERPL-MCNC: 7.6 G/DL (ref 6–8.5)
SODIUM BLD-SCNC: 138 MMOL/L (ref 136–145)
TRIGL SERPL-MCNC: 84 MG/DL (ref 0–150)
TSH SERPL DL<=0.05 MIU/L-ACNC: 1.94 MIU/ML (ref 0.27–4.2)
VLDLC SERPL-MCNC: 16.8 MG/DL (ref 5–40)

## 2019-04-23 PROCEDURE — 80061 LIPID PANEL: CPT | Performed by: INTERNAL MEDICINE

## 2019-04-23 PROCEDURE — 36415 COLL VENOUS BLD VENIPUNCTURE: CPT

## 2019-04-23 PROCEDURE — 80053 COMPREHEN METABOLIC PANEL: CPT

## 2019-04-23 PROCEDURE — 84443 ASSAY THYROID STIM HORMONE: CPT

## 2019-04-23 PROCEDURE — 83036 HEMOGLOBIN GLYCOSYLATED A1C: CPT

## 2019-05-28 ENCOUNTER — OFFICE VISIT (OUTPATIENT)
Dept: GASTROENTEROLOGY | Facility: CLINIC | Age: 34
End: 2019-05-28

## 2019-05-28 VITALS
HEIGHT: 60 IN | DIASTOLIC BLOOD PRESSURE: 72 MMHG | WEIGHT: 236.4 LBS | SYSTOLIC BLOOD PRESSURE: 127 MMHG | HEART RATE: 81 BPM | BODY MASS INDEX: 46.41 KG/M2

## 2019-05-28 DIAGNOSIS — K21.00 REFLUX ESOPHAGITIS: ICD-10-CM

## 2019-05-28 DIAGNOSIS — K29.70 GASTRITIS WITHOUT BLEEDING, UNSPECIFIED CHRONICITY, UNSPECIFIED GASTRITIS TYPE: Primary | ICD-10-CM

## 2019-05-28 PROCEDURE — 99213 OFFICE O/P EST LOW 20 MIN: CPT | Performed by: NURSE PRACTITIONER

## 2019-05-28 RX ORDER — PANTOPRAZOLE SODIUM 40 MG/1
40 TABLET, DELAYED RELEASE ORAL 2 TIMES DAILY
Qty: 60 TABLET | Refills: 5 | OUTPATIENT
Start: 2019-05-28 | End: 2020-05-14

## 2019-05-28 RX ORDER — CHOLECALCIFEROL (VITAMIN D3) 125 MCG
5 CAPSULE ORAL NIGHTLY PRN
COMMUNITY

## 2019-05-28 NOTE — PROGRESS NOTES
GASTROENTEROLOGY OFFICE NOTE  Andreina Segovia  [id number]  1985    CARE TEAM  Patient Care Team:  Deepti Ly MD as PCP - General (Internal Medicine)    Referring Provider: Deepti Ly MD    Chief Complaint   Patient presents with   • Follow-up     from EGD for pharyngeal dysphagia, sspected GERD, and pill esophagitis        HISTORY OF PRESENT ILLNESS:  Ms. Segovia returns in follow-up after having an EGD in March for pill dysphasia.  EGD was significant for a 2 cm hiatal hernia, reflux esophagitis, and gastritis.  She was prescribed Protonix 40 mg daily.  She has had very minimal signs of dysphasia.  She reports that she has continued to have heartburn daily.  The Protonix seems to work throughout the day however by late evening she is beginning to have heartburn symptoms again.    PAST MEDICAL HISTORY  Past Medical History:   Diagnosis Date   • Bronchitis    • Diabetes in pregnancy     Gestational   • Infectious mononucleosis    • Ovarian cyst    • Wears glasses         PAST SURGICAL HISTORY  Past Surgical History:   Procedure Laterality Date   • COLONOSCOPY     • DIAGNOSTIC LAPAROSCOPY N/A 1/4/2017    Procedure: DIAGNOSTIC LAPAROSCOPY, LAP BILATERAL SALPINGECTOMY;  Surgeon: Kaia Humphrey MD;  Location:  [facility];  Service:    • ENDOSCOPY N/A 3/21/2019    Procedure: EGD;  Surgeon: Wyatt Cool MD;  Location: [facility] ENDOSCOPY;  Service: Gastroenterology   • SALPINGECTOMY     • WISDOM TOOTH EXTRACTION          MEDICATIONS:    Current Outpatient Medications:   •  melatonin 5 MG tablet tablet, Take 5 mg by mouth At Night As Needed., Disp: , Rfl:   •  cetirizine (zyrTEC) 10 MG tablet, Take 10 mg by mouth Daily., Disp: , Rfl:   •  cholecalciferol (VITAMIN D3) 1000 UNITS tablet, Take 2,000 Units by mouth Daily., Disp: , Rfl:   •  pantoprazole (PROTONIX) 40 MG EC tablet, Take 1 tablet by mouth 2 (Two) Times a Day., Disp: 60 tablet, Rfl: 5    ALLERGIES  Allergies   Allergen Reactions  "  • Adhesive Tape    • Clindamycin/Lincomycin Other (See Comments)     Pill esophagitis       FAMILY HISTORY:  Family History   Problem Relation Age of Onset   • Ulcerative colitis Mother    • Hypertension Mother    • Glaucoma Father    • Osteoarthritis Father    • Cancer Other    • Diabetes Other    • Heart attack Other    • Stroke Other        SOCIAL HISTORY  Social History     Socioeconomic History   • Marital status:      Spouse name: Not on file   • Number of children: Not on file   • Years of education: Not on file   • Highest education level: Not on file   Tobacco Use   • Smoking status: Never Smoker   • Smokeless tobacco: Never Used   Substance and Sexual Activity   • Alcohol use: Yes     Comment: 2 per week   • Drug use: No   • Sexual activity: No       REVIEW OF SYSTEMS  Review of Systems   HENT: Negative.    Respiratory: Negative.    Cardiovascular: Negative.    Gastrointestinal: Positive for GERD.   Genitourinary: Negative.    Musculoskeletal: Negative.    Psychiatric/Behavioral: Negative.          PHYSICAL EXAM   /72 (BP Location: Right arm, Patient Position: Sitting, Cuff Size: Adult)   Pulse 81   Ht 152.4 cm (60\")   Wt 107 kg (236 lb 6.4 oz)   BMI 46.17 kg/m²   Physical Exam   Constitutional: She is oriented to person, place, and time. She appears well-developed and well-nourished.   HENT:   Head: Normocephalic.   Mouth/Throat: Oropharynx is clear and moist.   Eyes: EOM are normal. Pupils are equal, round, and reactive to light.   Neck: Normal range of motion. Neck supple.   Cardiovascular: Normal rate and regular rhythm.   Pulmonary/Chest: Effort normal and breath sounds normal. She has no wheezes. She has no rales.   Abdominal: Soft. Bowel sounds are normal. She exhibits no mass. There is no tenderness. There is no rebound and no guarding. No hernia.   Musculoskeletal: Normal range of motion.   Neurological: She is alert and oriented to person, place, and time. No cranial nerve " deficit.   Skin: Skin is warm and dry.   Psychiatric: She has a normal mood and affect. Her behavior is normal. Judgment normal.   Nursing note and vitals reviewed.    Results Review:  I have reviewed the patient's new clinical results.    ASSESSMENT / PLAN  1.  Reflux esophagitis  2.  Gastritis  - Increase Protonix to 40 mg twice daily  - Consider repeat EGD for dilation if dysphagia complaints worsen    Return in about 3 months (around 8/28/2019) for Follow Up w/ Shari Callahan.    I discussed the patients findings and my recommendations with patient    GISELLE Castellon

## 2019-06-11 ENCOUNTER — LAB (OUTPATIENT)
Dept: LAB | Facility: HOSPITAL | Age: 34
End: 2019-06-11

## 2019-06-11 ENCOUNTER — TRANSCRIBE ORDERS (OUTPATIENT)
Dept: ADMINISTRATIVE | Facility: HOSPITAL | Age: 34
End: 2019-06-11

## 2019-06-11 ENCOUNTER — TRANSCRIBE ORDERS (OUTPATIENT)
Dept: LAB | Facility: HOSPITAL | Age: 34
End: 2019-06-11

## 2019-06-11 DIAGNOSIS — R42 DIZZINESS AND GIDDINESS: Primary | ICD-10-CM

## 2019-06-11 DIAGNOSIS — Z86.69 HX OF MIGRAINES: ICD-10-CM

## 2019-06-11 DIAGNOSIS — Z82.0 FH: MIGRAINES: ICD-10-CM

## 2019-06-11 DIAGNOSIS — R42 DIZZINESS: Primary | ICD-10-CM

## 2019-06-11 DIAGNOSIS — R42 DIZZINESS AND GIDDINESS: ICD-10-CM

## 2019-06-11 LAB
ANION GAP SERPL CALCULATED.3IONS-SCNC: 12.1 MMOL/L
BASOPHILS # BLD AUTO: 0.04 10*3/MM3 (ref 0–0.2)
BASOPHILS NFR BLD AUTO: 0.5 % (ref 0–1.5)
BUN BLD-MCNC: 11 MG/DL (ref 6–20)
BUN/CREAT SERPL: 11.8 (ref 7–25)
CALCIUM SPEC-SCNC: 9.5 MG/DL (ref 8.6–10.5)
CHLORIDE SERPL-SCNC: 104 MMOL/L (ref 98–107)
CO2 SERPL-SCNC: 23.9 MMOL/L (ref 22–29)
CREAT BLD-MCNC: 0.93 MG/DL (ref 0.57–1)
DEPRECATED RDW RBC AUTO: 45.2 FL (ref 37–54)
EOSINOPHIL # BLD AUTO: 0.17 10*3/MM3 (ref 0–0.4)
EOSINOPHIL NFR BLD AUTO: 2.2 % (ref 0.3–6.2)
ERYTHROCYTE [DISTWIDTH] IN BLOOD BY AUTOMATED COUNT: 13.3 % (ref 12.3–15.4)
GFR SERPL CREATININE-BSD FRML MDRD: 69 ML/MIN/1.73
GLUCOSE BLD-MCNC: 83 MG/DL (ref 65–99)
HCT VFR BLD AUTO: 41.2 % (ref 34–46.6)
HGB BLD-MCNC: 13.1 G/DL (ref 12–15.9)
IMM GRANULOCYTES # BLD AUTO: 0.02 10*3/MM3 (ref 0–0.05)
IMM GRANULOCYTES NFR BLD AUTO: 0.3 % (ref 0–0.5)
LYMPHOCYTES # BLD AUTO: 2.03 10*3/MM3 (ref 0.7–3.1)
LYMPHOCYTES NFR BLD AUTO: 26.4 % (ref 19.6–45.3)
MCH RBC QN AUTO: 29.5 PG (ref 26.6–33)
MCHC RBC AUTO-ENTMCNC: 31.8 G/DL (ref 31.5–35.7)
MCV RBC AUTO: 92.8 FL (ref 79–97)
MONOCYTES # BLD AUTO: 0.4 10*3/MM3 (ref 0.1–0.9)
MONOCYTES NFR BLD AUTO: 5.2 % (ref 5–12)
NEUTROPHILS # BLD AUTO: 5.04 10*3/MM3 (ref 1.7–7)
NEUTROPHILS NFR BLD AUTO: 65.4 % (ref 42.7–76)
NRBC BLD AUTO-RTO: 0 /100 WBC (ref 0–0.2)
PLATELET # BLD AUTO: 237 10*3/MM3 (ref 140–450)
PMV BLD AUTO: 12.2 FL (ref 6–12)
POTASSIUM BLD-SCNC: 4.5 MMOL/L (ref 3.5–5.2)
RBC # BLD AUTO: 4.44 10*6/MM3 (ref 3.77–5.28)
SODIUM BLD-SCNC: 140 MMOL/L (ref 136–145)
WBC NRBC COR # BLD: 7.7 10*3/MM3 (ref 3.4–10.8)

## 2019-06-11 PROCEDURE — 36415 COLL VENOUS BLD VENIPUNCTURE: CPT

## 2019-06-11 PROCEDURE — 80048 BASIC METABOLIC PNL TOTAL CA: CPT

## 2019-06-11 PROCEDURE — 85025 COMPLETE CBC W/AUTO DIFF WBC: CPT

## 2019-06-13 ENCOUNTER — HOSPITAL ENCOUNTER (OUTPATIENT)
Dept: MRI IMAGING | Facility: HOSPITAL | Age: 34
Discharge: HOME | End: 2019-06-13
Admitting: INTERNAL MEDICINE

## 2019-06-13 DIAGNOSIS — R42 DIZZINESS: ICD-10-CM

## 2019-06-13 DIAGNOSIS — Z86.69 HX OF MIGRAINES: ICD-10-CM

## 2019-06-13 PROCEDURE — 70551 MRI BRAIN STEM W/O DYE: CPT

## 2019-07-23 ENCOUNTER — OFFICE VISIT (OUTPATIENT)
Dept: GASTROENTEROLOGY | Facility: CLINIC | Age: 34
End: 2019-07-23

## 2019-07-23 VITALS
SYSTOLIC BLOOD PRESSURE: 112 MMHG | BODY MASS INDEX: 46.26 KG/M2 | OXYGEN SATURATION: 99 % | HEART RATE: 86 BPM | WEIGHT: 235.6 LBS | HEIGHT: 60 IN | TEMPERATURE: 97.3 F | DIASTOLIC BLOOD PRESSURE: 72 MMHG

## 2019-07-23 DIAGNOSIS — E66.01 OBESITY, MORBID, BMI 40.0-49.9 (HCC): ICD-10-CM

## 2019-07-23 DIAGNOSIS — K21.9 CHRONIC GERD: Primary | ICD-10-CM

## 2019-07-23 PROCEDURE — 99213 OFFICE O/P EST LOW 20 MIN: CPT | Performed by: NURSE PRACTITIONER

## 2019-07-23 NOTE — PROGRESS NOTES
GASTROENTEROLOGY OUTPATIENT ESTABLISHED PATIENT NOTE  Patient: ANDREINA TORRES : 1985  Date of Service: 2019  CC: Follow-up (Gastritis)    Assessment/Plan                                             ASSESSMENT & PLANS     Chronic GERD  · Continue Protonix BID. Pt is encouraged to cut back on PPI to once daily once sx better controlled. Hopefully, with weight reduction, pt has better sx control    Obesity, morbid, BMI 40.0-49.9 (CMS/[facility])  -     Ambulatory Referral to Weight Management Program  · Pt is educated on BMI and obesity.  Pt is encouraged of weight control as weight gain and obesity can worsen GI sx and cause other health problems (fatty liver, heart disease, diabetes, etc).    Follow Up:Return if symptoms worsen or fail to improve, for Recheck.      DISCUSSION: The above plan was delineated in details with patient and all questions and concerns were answered.  Patient is also given contact information.  Patient is to return as scheduled or sooner if new problems arise.   Subjective                                                     SUBJECTIVE   History of Present Illness  Ms. Andreina Torres is a 33 y.o. female who is here for Follow-up (Gastritis)  sx improved w/ taking BID Protonix dosing (instead of once daily) since . Pt denies anorexia, nausea, vomiting, dysphagia, odynophagia, heartburn, reflux, regurgitation, or early satiety.  Pt has good appetite. No abdominal pain. No change in bowel habits.      Has not been able to lose weight since being on vacation.  Wants to lose wt  Occasional left flank, started Sat. No dysuria or gross hematuria.  Hx of UTI in the past    EGD, done on 3/21/19 by Dr Trinidad d/t dysphagia, showed hiatal hernia, LA Grade B esophagitis, gastritis, but normal duodenum.   Colonoscopy, done on 10/19/2016 by Dr. Cool due to left lower quadrant abdominal pain and diarrhea, showed cecal diverticulosis, but otherwise normal colonoscopy to  the terminal ileum.  Random colon biopsy showed mild proprial edema, but no histologic abnormality.    ROS:Review of Systems   Constitutional: Negative.         Pt currently or recently takes NSAIDS ( (i.e Ibuprofen, Aleve, Advil, Exedrin, BC Powder, diclofenac, meloxicam, & Naproxen, etc)?  No    Pt currently or recently takes abx? No   Eyes: Negative.    Respiratory: Negative.    Cardiovascular: Negative.    Gastrointestinal: Negative.         Heartburn   Endocrine: Negative.    Genitourinary: Negative.    Musculoskeletal: Negative.    Skin: Negative.    Allergic/Immunologic: Negative.    Neurological: Positive for headaches (migraines).   Hematological: Negative.    Psychiatric/Behavioral: Negative.      Objective                                                           OBJECTIVE   Allergy: Pt is allergic to adhesive tape and clindamycin/lincomycin.  MEDS: •  cetirizine (zyrTEC) 10 MG tablet, Take 10 mg by mouth Daily., Disp: , Rfl:   •  cholecalciferol (VITAMIN D3) 1000 UNITS tablet, Take 2,000 Units by mouth Daily., Disp: , Rfl:   •  meclizine (ANTIVERT) 25 MG tablet, TAKE 1 TABLET 3 TIMES DAILY AS NEEDED., Disp: 15 tablet, Rfl: 0  •  melatonin 5 MG tablet tablet, Take 5 mg by mouth At Night As Needed., Disp: , Rfl:   •  pantoprazole (PROTONIX) 40 MG EC tablet, Take 1 tablet by mouth 2 (Two) Times a Day., Disp: 60 tablet, Rfl: 5  •  SUMAtriptan (IMITREX) 50 MG tablet, Take 1 tablet by mouth 2 (Two) Times a Day As Needed for migraine relief.  May repeat every 2 hours.  Max 200mg/day., Disp: 9 tablet, Rfl: 3  Pathology  Lab Results   Component Value Date    FINALDX  03/21/2019     1. GASTRIC ANTRUM BIOPSIES:  Reactive gastropathic changes and chronic gastritis.  Immunohistochemistry negative for H. pylori.    2. GASTRIC BODY BIOPSIES:  Slight chronic gastritis.  3. DISTAL ESOPHAGEAL BIOPSY:  Reflux esophagitis.  No intestinal goblet cell metaplasia or dysplasia identified.   4. MIDESOPHAGEAL BIOPSY:  Detached  fragments of squamous epithelium without histopathologic abnormalities.    DGD/mbc       FINALDX  01/04/2017     BILATERAL FALLOPIAN TUBE EXCISION:  Histologically unremarkable fallopian tubes with tiny benign paratubal cysts.  DGD/klb       MICRO  03/21/2019     Sections of the gastric antrum show distortion of the superficial architecture with elongation of the gastric pits, increased branching, and a diffuse chronic inflammatory infiltrate that is predominately lymphocytic. No intestinal metaplasia or dysplasia is seen. Immunohistochemistry for H. pylori shows no organisms. All controls stain appropriately including external positive, internal negative and external negative controls as required.      Sections of the gastric body show no erosion, ulceration, or goblet cell metaplasia in the gastric mucosa. The parietal cell populations appear normal. There is a very slight chronic, lymphocytic, infiltrate.     Sections of the GE junction show acanthosis of the squamous epithelium with increased numbers of small capillaries per unit area and a hyperplasia in the basal regenerative layer. There is a very modest chronic inflammatory infiltrate trafficking in the intraepithelial spaces. No neoplastic infiltrates are identified. The contiguous gastric mucosa shows foveolar epithelial hyperplasia without evidence of goblet cell intestinal metaplasia.     Sections of the midesophageal biopsy show strips of detached squamous epithelium compatible with that of the esophagus showing no viral cytopathic changes, fungal elements, dysplasia, or ulcerations.       MICRO  01/04/2017     The bilateral tubal segments show patent lumina and no plical fusion or inflammation.       Lab Results   Component Value Date    WBC 7.70 06/11/2019    WBC 8.41 01/15/2017    WBC 7.31 12/27/2016    EOSABS 0.17 06/11/2019    EOSABS 0.16 01/15/2017    EOSABS 0.13 02/22/2016    HGB 13.1 06/11/2019    HGB 12.8 01/15/2017    HGB 13.7 12/27/2016     HCT 41.2 06/11/2019    HCT 38.8 01/15/2017    HCT 40.6 12/27/2016    MCV 92.8 06/11/2019    MCV 89.6 01/15/2017    MCV 87.9 12/27/2016    MCHC 31.8 06/11/2019    MCHC 33.0 01/15/2017    MCHC 33.7 12/27/2016     06/11/2019     01/15/2017     12/27/2016     Lab Results   Component Value Date    RDW 13.3 06/11/2019    RDW 13.5 01/15/2017    RDW 14.2 12/27/2016    MCHC 31.8 06/11/2019    MCHC 33.0 01/15/2017    MCHC 33.7 12/27/2016     Lab Results   Component Value Date     06/11/2019    K 4.5 06/11/2019     06/11/2019    CO2 23.9 06/11/2019    BUN 11 06/11/2019    BUN 13 04/23/2019    BUN 9 01/15/2017    CREATININE 0.93 06/11/2019    CREATININE 0.74 04/23/2019    CREATININE 0.70 01/15/2017    EGFRIFNONA 69 06/11/2019    EGFRIFNONA 89 04/23/2019    EGFRIFNONA 96 01/15/2017    GLUCOSE 83 06/11/2019    CALCIUM 9.5 06/11/2019    ANIONGAP 12.1 06/11/2019     Lab Results   Component Value Date    AST 20 04/23/2019    AST 20 01/15/2017    AST 23 02/22/2016    ALT 18 04/23/2019    ALT 20 01/15/2017    ALT 20 02/22/2016    ALKPHOS 92 04/23/2019    ALKPHOS 114 (H) 01/15/2017    ALKPHOS 123 (H) 02/22/2016    BILITOT 0.3 04/23/2019    BILITOT 0.2 (L) 01/15/2017    BILITOT 0.3 02/22/2016    ALBUMIN 4.00 04/23/2019    ALBUMIN 4.30 01/15/2017    ALBUMIN 4.4 02/22/2016     Lab Results   Component Value Date    LIPASE 41 02/22/2016     Lab Results   Component Value Date    HGBA1C 5.57 04/23/2019    HGBA1C 5.50 10/18/2016    TSH 1.940 04/23/2019    TSH 1.268 10/07/2015     Lab Results   Component Value Date    INR 0.92 01/15/2017     Lab Results   Component Value Date    HPYLORIBR Negative 03/06/2019     Wt Readings from Last 5 Encounters:   07/23/19 107 kg (235 lb 9.6 oz)   06/13/19 103 kg (227 lb)   05/28/19 107 kg (236 lb 6.4 oz)   03/21/19 104 kg (229 lb)   03/06/19 106 kg (234 lb)   body mass index is 46.01 kg/m².,Temp: 97.3 °F (36.3 °C),BP: 112/72,Heart Rate: 86   Physical Exam  General Well  developed; well nourished. NAD  ENT Anicteric sclerae. Oral mucosa pink and moist without thrush or lesions    GI Abd soft, NT, ND, normal active bowel sounds.  Obese. No abd hernia    Pt care team: Shari DESAI & Alla Gamez JADON  07/23/19 9:22 AM  [facility]--Gastroenterology  [phone]    CC: , Deepti ODONNELL MD   [address] [facility] [address] FAX:[phone]

## 2019-07-24 PROCEDURE — 87086 URINE CULTURE/COLONY COUNT: CPT | Performed by: PHYSICIAN ASSISTANT

## 2019-07-25 ENCOUNTER — TELEPHONE (OUTPATIENT)
Dept: URGENT CARE | Facility: CLINIC | Age: 34
End: 2019-07-25

## 2019-07-27 ENCOUNTER — APPOINTMENT (OUTPATIENT)
Dept: CT IMAGING | Facility: HOSPITAL | Age: 34
End: 2019-07-27

## 2019-07-27 ENCOUNTER — HOSPITAL ENCOUNTER (EMERGENCY)
Facility: HOSPITAL | Age: 34
Discharge: HOME | End: 2019-07-27
Attending: EMERGENCY MEDICINE | Admitting: EMERGENCY MEDICINE

## 2019-07-27 VITALS
DIASTOLIC BLOOD PRESSURE: 68 MMHG | SYSTOLIC BLOOD PRESSURE: 114 MMHG | HEART RATE: 98 BPM | TEMPERATURE: 97.7 F | WEIGHT: 227 LBS | OXYGEN SATURATION: 100 % | RESPIRATION RATE: 14 BRPM | HEIGHT: 60 IN | BODY MASS INDEX: 44.57 KG/M2

## 2019-07-27 DIAGNOSIS — R10.9 LEFT FLANK PAIN: Primary | ICD-10-CM

## 2019-07-27 DIAGNOSIS — R10.9 ACUTE ABDOMINAL PAIN: ICD-10-CM

## 2019-07-27 LAB
ALBUMIN SERPL-MCNC: 4 G/DL (ref 3.5–5.2)
ALBUMIN/GLOB SERPL: 1 G/DL
ALP SERPL-CCNC: 119 U/L (ref 39–117)
ALT SERPL W P-5'-P-CCNC: 19 U/L (ref 1–33)
ANION GAP SERPL CALCULATED.3IONS-SCNC: 14 MMOL/L (ref 5–15)
AST SERPL-CCNC: 21 U/L (ref 1–32)
B-HCG UR QL: NEGATIVE
BACTERIA UR QL AUTO: ABNORMAL /HPF
BASOPHILS # BLD AUTO: 0.07 10*3/MM3 (ref 0–0.2)
BASOPHILS NFR BLD AUTO: 0.6 % (ref 0–1.5)
BILIRUB SERPL-MCNC: 0.2 MG/DL (ref 0.2–1.2)
BILIRUB UR QL STRIP: NEGATIVE
BUN BLD-MCNC: 15 MG/DL (ref 6–20)
BUN/CREAT SERPL: 17.6 (ref 7–25)
CALCIUM SPEC-SCNC: 9.4 MG/DL (ref 8.6–10.5)
CHLORIDE SERPL-SCNC: 99 MMOL/L (ref 98–107)
CLARITY UR: CLEAR
CO2 SERPL-SCNC: 24 MMOL/L (ref 22–29)
COLOR UR: YELLOW
CREAT BLD-MCNC: 0.85 MG/DL (ref 0.57–1)
DEPRECATED RDW RBC AUTO: 43.4 FL (ref 37–54)
EOSINOPHIL # BLD AUTO: 0.24 10*3/MM3 (ref 0–0.4)
EOSINOPHIL NFR BLD AUTO: 2 % (ref 0.3–6.2)
ERYTHROCYTE [DISTWIDTH] IN BLOOD BY AUTOMATED COUNT: 13.2 % (ref 12.3–15.4)
GFR SERPL CREATININE-BSD FRML MDRD: 76 ML/MIN/1.73
GLOBULIN UR ELPH-MCNC: 4 GM/DL
GLUCOSE BLD-MCNC: 114 MG/DL (ref 65–99)
GLUCOSE UR STRIP-MCNC: NEGATIVE MG/DL
HCT VFR BLD AUTO: 39.8 % (ref 34–46.6)
HGB BLD-MCNC: 12.6 G/DL (ref 12–15.9)
HGB UR QL STRIP.AUTO: NEGATIVE
HOLD SPECIMEN: NORMAL
HOLD SPECIMEN: NORMAL
HYALINE CASTS UR QL AUTO: ABNORMAL /LPF
IMM GRANULOCYTES # BLD AUTO: 0.06 10*3/MM3 (ref 0–0.05)
IMM GRANULOCYTES NFR BLD AUTO: 0.5 % (ref 0–0.5)
INTERNAL NEGATIVE CONTROL: NEGATIVE
INTERNAL POSITIVE CONTROL: POSITIVE
KETONES UR QL STRIP: ABNORMAL
LEUKOCYTE ESTERASE UR QL STRIP.AUTO: ABNORMAL
LIPASE SERPL-CCNC: 27 U/L (ref 13–60)
LYMPHOCYTES # BLD AUTO: 2.37 10*3/MM3 (ref 0.7–3.1)
LYMPHOCYTES NFR BLD AUTO: 19.8 % (ref 19.6–45.3)
Lab: NORMAL
MCH RBC QN AUTO: 28.6 PG (ref 26.6–33)
MCHC RBC AUTO-ENTMCNC: 31.7 G/DL (ref 31.5–35.7)
MCV RBC AUTO: 90.2 FL (ref 79–97)
MONOCYTES # BLD AUTO: 0.82 10*3/MM3 (ref 0.1–0.9)
MONOCYTES NFR BLD AUTO: 6.9 % (ref 5–12)
NEUTROPHILS # BLD AUTO: 8.41 10*3/MM3 (ref 1.7–7)
NEUTROPHILS NFR BLD AUTO: 70.2 % (ref 42.7–76)
NITRITE UR QL STRIP: NEGATIVE
NRBC BLD AUTO-RTO: 0 /100 WBC (ref 0–0.2)
PH UR STRIP.AUTO: <=5 [PH] (ref 5–8)
PLATELET # BLD AUTO: 327 10*3/MM3 (ref 140–450)
PMV BLD AUTO: 10.3 FL (ref 6–12)
POTASSIUM BLD-SCNC: 3.8 MMOL/L (ref 3.5–5.2)
PROT SERPL-MCNC: 8 G/DL (ref 6–8.5)
PROT UR QL STRIP: NEGATIVE
RBC # BLD AUTO: 4.41 10*6/MM3 (ref 3.77–5.28)
RBC # UR: ABNORMAL /HPF
REF LAB TEST METHOD: ABNORMAL
SODIUM BLD-SCNC: 137 MMOL/L (ref 136–145)
SP GR UR STRIP: 1.03 (ref 1–1.03)
SQUAMOUS #/AREA URNS HPF: ABNORMAL /HPF
UROBILINOGEN UR QL STRIP: ABNORMAL
WBC NRBC COR # BLD: 11.97 10*3/MM3 (ref 3.4–10.8)
WBC UR QL AUTO: ABNORMAL /HPF
WHOLE BLOOD HOLD SPECIMEN: NORMAL
WHOLE BLOOD HOLD SPECIMEN: NORMAL

## 2019-07-27 PROCEDURE — 25010000002 KETOROLAC TROMETHAMINE PER 15 MG: Performed by: NURSE PRACTITIONER

## 2019-07-27 PROCEDURE — 80053 COMPREHEN METABOLIC PANEL: CPT | Performed by: EMERGENCY MEDICINE

## 2019-07-27 PROCEDURE — 83690 ASSAY OF LIPASE: CPT | Performed by: EMERGENCY MEDICINE

## 2019-07-27 PROCEDURE — 96375 TX/PRO/DX INJ NEW DRUG ADDON: CPT

## 2019-07-27 PROCEDURE — 85025 COMPLETE CBC W/AUTO DIFF WBC: CPT | Performed by: EMERGENCY MEDICINE

## 2019-07-27 PROCEDURE — 25010000002 ONDANSETRON PER 1 MG: Performed by: NURSE PRACTITIONER

## 2019-07-27 PROCEDURE — 87086 URINE CULTURE/COLONY COUNT: CPT | Performed by: NURSE PRACTITIONER

## 2019-07-27 PROCEDURE — 81025 URINE PREGNANCY TEST: CPT | Performed by: EMERGENCY MEDICINE

## 2019-07-27 PROCEDURE — 74176 CT ABD & PELVIS W/O CONTRAST: CPT

## 2019-07-27 PROCEDURE — 96374 THER/PROPH/DIAG INJ IV PUSH: CPT

## 2019-07-27 PROCEDURE — 81001 URINALYSIS AUTO W/SCOPE: CPT | Performed by: EMERGENCY MEDICINE

## 2019-07-27 PROCEDURE — 99284 EMERGENCY DEPT VISIT MOD MDM: CPT

## 2019-07-27 RX ORDER — ONDANSETRON 4 MG/1
4 TABLET, ORALLY DISINTEGRATING ORAL EVERY 8 HOURS PRN
Qty: 9 TABLET | Refills: 0 | OUTPATIENT
Start: 2019-07-27 | End: 2019-11-02

## 2019-07-27 RX ORDER — PHENAZOPYRIDINE HYDROCHLORIDE 200 MG/1
200 TABLET, FILM COATED ORAL 3 TIMES DAILY PRN
Qty: 9 TABLET | Refills: 0 | OUTPATIENT
Start: 2019-07-27 | End: 2019-09-05

## 2019-07-27 RX ORDER — KETOROLAC TROMETHAMINE 15 MG/ML
15 INJECTION, SOLUTION INTRAMUSCULAR; INTRAVENOUS ONCE
Status: COMPLETED | OUTPATIENT
Start: 2019-07-27 | End: 2019-07-27

## 2019-07-27 RX ORDER — SODIUM CHLORIDE 0.9 % (FLUSH) 0.9 %
10 SYRINGE (ML) INJECTION AS NEEDED
Status: DISCONTINUED | OUTPATIENT
Start: 2019-07-27 | End: 2019-07-27 | Stop reason: HOSPADM

## 2019-07-27 RX ORDER — ONDANSETRON 2 MG/ML
4 INJECTION INTRAMUSCULAR; INTRAVENOUS ONCE
Status: COMPLETED | OUTPATIENT
Start: 2019-07-27 | End: 2019-07-27

## 2019-07-27 RX ADMIN — SODIUM CHLORIDE 1000 ML: 9 INJECTION, SOLUTION INTRAVENOUS at 08:45

## 2019-07-27 RX ADMIN — ONDANSETRON 4 MG: 2 INJECTION INTRAMUSCULAR; INTRAVENOUS at 08:47

## 2019-07-27 RX ADMIN — KETOROLAC TROMETHAMINE 15 MG: 15 INJECTION, SOLUTION INTRAMUSCULAR; INTRAVENOUS at 08:49

## 2019-07-28 LAB — BACTERIA SPEC AEROBE CULT: NORMAL

## 2019-11-02 ENCOUNTER — HOSPITAL ENCOUNTER (EMERGENCY)
Facility: HOSPITAL | Age: 34
Discharge: HOME | End: 2019-11-02
Attending: EMERGENCY MEDICINE | Admitting: EMERGENCY MEDICINE

## 2019-11-02 VITALS
SYSTOLIC BLOOD PRESSURE: 135 MMHG | HEART RATE: 85 BPM | BODY MASS INDEX: 46.37 KG/M2 | DIASTOLIC BLOOD PRESSURE: 70 MMHG | TEMPERATURE: 97.9 F | OXYGEN SATURATION: 98 % | RESPIRATION RATE: 18 BRPM | WEIGHT: 230 LBS | HEIGHT: 59 IN

## 2019-11-02 DIAGNOSIS — Z02.89 REFERRED BY HEALTH CARE PROFESSIONAL: ICD-10-CM

## 2019-11-02 DIAGNOSIS — T78.40XA ALLERGIC REACTION, INITIAL ENCOUNTER: Primary | ICD-10-CM

## 2019-11-02 PROCEDURE — 63710000001 PREDNISONE PER 1 MG: Performed by: EMERGENCY MEDICINE

## 2019-11-02 PROCEDURE — 99284 EMERGENCY DEPT VISIT MOD MDM: CPT

## 2019-11-02 RX ORDER — FAMOTIDINE 20 MG/1
20 TABLET, FILM COATED ORAL 2 TIMES DAILY
Qty: 14 TABLET | Refills: 0 | Status: SHIPPED | OUTPATIENT
Start: 2019-11-02 | End: 2020-05-24 | Stop reason: SDUPTHER

## 2019-11-02 RX ORDER — METHYLPREDNISOLONE 4 MG/1
TABLET ORAL
Qty: 21 TABLET | Refills: 0 | OUTPATIENT
Start: 2019-11-02 | End: 2020-05-14

## 2019-11-02 RX ORDER — PREDNISONE 20 MG/1
60 TABLET ORAL ONCE
Status: COMPLETED | OUTPATIENT
Start: 2019-11-02 | End: 2019-11-02

## 2019-11-02 RX ORDER — FAMOTIDINE 20 MG/1
40 TABLET, FILM COATED ORAL ONCE
Status: COMPLETED | OUTPATIENT
Start: 2019-11-02 | End: 2019-11-02

## 2019-11-02 RX ORDER — DIPHENHYDRAMINE HCL 25 MG
25 TABLET ORAL EVERY 6 HOURS PRN
Qty: 30 TABLET | Refills: 0 | Status: SHIPPED | OUTPATIENT
Start: 2019-11-02

## 2019-11-02 RX ADMIN — FAMOTIDINE 40 MG: 20 TABLET ORAL at 14:43

## 2019-11-02 RX ADMIN — PREDNISONE 60 MG: 20 TABLET ORAL at 14:43

## 2019-11-02 NOTE — ED PROVIDER NOTES
Subjective   Andreina Segovia is a 34 y.o. female who presents to the ED with c/o allergic reaction. 2 weeks ago the patient had an allergic reaction after taking Naproxen while sitting at home. She had swollen and erythematous hands, for which she took Benadryl for. This morning the patient awoke at 0015 and took Flonase and went back to sleep. Today she left her home and after 10 minutes of driving she began to have a similar allergic reaction to the one she had 2 weeks ago. The patient stopped and got Benadryl to take to alleviate the reaction but it worsened with her lips swelling up and her face becoming erythematous. She presented to the [facility] at 1319 today and was not administered any medication but she was advised to present to the ED for further evaluation. At [facility] she had a pulse oximetry of 95 with tachycardia, her heart rate was in the 140's, and her throat was closing up mildly. The patient's symptoms are resolving here at the ED. Normally she takes Zyrtec every day but for the past couple weeks has not done so and she has not taken Protonix in the past 2 weeks. The patient complains of shortness of breath, itching, facial swelling, rhinorrhea, a rash that has resolved, and sore throat but denies cough, congestion, fever, and chills. She has a past medical history of diabetes during pregnancy but denies history of diabetes mellitus outside of pregnancy as well any heart, lung, kidney, or liver diseases. There are no other acute complaints at this time.        History provided by:  Patient  Allergic Reaction   Presenting symptoms: difficulty breathing, itching, rash (resolved) and swelling (facial)    Severity:  Moderate  Duration:  2 hours  Prior allergic episodes:  Allergies to medications  Context: medications    Context: not poison ivy    Relieved by:  Nothing  Worsened by:  Nothing  Ineffective treatments:  Antihistamines      Review of Systems   Constitutional: Negative for chills and fever.  "  HENT: Positive for facial swelling, rhinorrhea and sore throat. Negative for congestion.         Swelling in her throat.   Respiratory: Positive for shortness of breath. Negative for cough.    Gastrointestinal: Negative for nausea and vomiting.   Skin: Positive for color change (erythematous hands and face), itching and rash (resolved).        Patient complains of itching.   Neurological: Negative for syncope, weakness and numbness.   All other systems reviewed and are negative.      Past Medical History:   Diagnosis Date   • Bronchitis    • Diabetes in pregnancy     Gestational   • Infectious mononucleosis    • Migraines    • Ovarian cyst    • Wears glasses        Allergies   Allergen Reactions   • Adhesive Tape Other (See Comments)     \"Takes my skin off\"; hives   • Clindamycin/Lincomycin Other (See Comments)     Pill esophagitis       Past Surgical History:   Procedure Laterality Date   • COLONOSCOPY     • DIAGNOSTIC LAPAROSCOPY N/A 1/4/2017    Procedure: DIAGNOSTIC LAPAROSCOPY, LAP BILATERAL SALPINGECTOMY;  Surgeon: Kaia Humphrey MD;  Location:  [facility] OR;  Service:    • ENDOSCOPY N/A 3/21/2019    Procedure: EGD;  Surgeon: Wyatt Cool MD;  Location:  [facility] END[facility];  Service: Gastroenterology   • SALPINGECTOMY     • WISDOM TOOTH EXTRACTION         Family History   Problem Relation Age of Onset   • Ulcerative colitis Mother    • Hypertension Mother    • Glaucoma Father    • Osteoarthritis Father    • Cancer Other    • Diabetes Other    • Heart attack Other    • Stroke Other        Social History     Socioeconomic History   • Marital status:      Spouse name: Not on file   • Number of children: Not on file   • Years of education: Not on file   • Highest education level: Not on file   Tobacco Use   • Smoking status: Never Smoker   • Smokeless tobacco: Never Used   Substance and Sexual Activity   • Alcohol use: Yes     Comment: 2 per week   • Drug use: No   • Sexual activity: No "         Objective   Physical Exam   Constitutional: She is oriented to person, place, and time. She appears well-developed and well-nourished. No distress.   HENT:   Head: Normocephalic and atraumatic.   Eyes: Conjunctivae are normal. No scleral icterus.   Neck: Normal range of motion. Neck supple.   Cardiovascular: Normal rate, regular rhythm and normal heart sounds.   No murmur heard.  Pulmonary/Chest: Effort normal and breath sounds normal. No respiratory distress.   Abdominal: Soft. There is no tenderness.   Musculoskeletal: Normal range of motion. She exhibits no edema.   Neurological: She is alert and oriented to person, place, and time.   Skin: Skin is warm and dry. No petechiae and no rash noted. No erythema.   No rash, erythema, or petechiae.   Psychiatric: She has a normal mood and affect. Her behavior is normal.   Nursing note and vitals reviewed.      Procedures         ED Course  ED Course as of Nov 02 1617   Sat Nov 02, 2019   1427 With the patient.  She is completely asymptomatic now.  She has no oropharyngeal or uvular edema.  She has no skin rash.  She took Zyrtec daily including the day before her last episode.  She also took Protonix that day but did not take Protonix today.  She did not take an anti-inflammatory on her previous episode but did take an anti-inflammatory today.  I discussed the unlikely possibility that this is Zyrtec related, but she has no other sprays lotions personal care products or other exposures that she can explain this with.Treated with Pepcid as an H2 blocker today.  Her symptoms resolved both times with Benadryl, and I will have her take Benadryl every 6 hours as needed for symptoms.  We will treat with prednisone today as she has no history of diabetes with a normal recent A1c on regular checkup.  We will treat with a Medrol Dosepak to go home with.Of discourse follow-up with an allergist in view of the unusual symptoms that she is had with the medication this at low  "risk for allergic reaction.  I will have her avoid Zyrtec and nonsteroidalsWe discussed indications for immediate return to the ED including any recurrence of symptoms.  Patient is a nurse here at [employer].  She verbalized understanding agreement plan of care, need for follow-up with a specialist, and indications for immediate return.  [HH]   1534 Patient is again reevaluated prior to discharge.  She remains asymptomatic.  Vitals are stable without tachycardia.  Sats are excellent.  I discussed follow-up and indications for return again.  Very reliable patient agrees  [HH]      ED Course User Index  [HH] Fran Jarquin MD       No results found for this or any previous visit (from the past 24 hour(s)).  Note: In addition to lab results from this visit, the labs listed above may include labs taken at another facility or during a different encounter within the last 24 hours. Please correlate lab times with ED admission and discharge times for further clarification of the services performed during this visit.    No orders to display     Vitals:    11/02/19 1345 11/02/19 1349 11/02/19 1444 11/02/19 1536   BP:  (!) 136/103 132/86 135/70   Pulse: 87  80 85   Resp: 17   18   Temp: 97.9 °F (36.6 °C)      TempSrc: Oral      SpO2: 96%  98% 98%   Weight: 104 kg (230 lb)      Height: 149.9 cm (59\")        Medications   famotidine (PEPCID) tablet 40 mg (40 mg Oral Given 11/2/19 1443)   predniSONE (DELTASONE) tablet 60 mg (60 mg Oral Given 11/2/19 1443)     ECG/EMG Results (last 24 hours)     ** No results found for the last 24 hours. **        No orders to display                     MDM    Final diagnoses:   Allergic reaction, initial encounter   Referred by health care professional       Documentation assistance provided by vadim Dominguez.  Information recorded by the vadim was done at my direction and has been verified and validated by me.     Marco Dominguez  11/02/19 [id number]       Fran Jarquin MD  11/02/19 1618    "

## 2019-11-02 NOTE — DISCHARGE INSTRUCTIONS
Avoid Zyrtec and anti-inflammatory medications until follow-up.    Look through your environment for other exposures that may explain both of these episodes    Take Benadryl every 6 hours as needed for recurrence of symptoms    Take Pepcid 20 mg twice a day until follow-up    Take the Medrol Dosepak as prescribed    Follow-up with an allergist from the list above.  Call Monday for an appointment    Follow-up with your PCP as well for interim management until allergy follow-up, though I do recommend specialist follow-up in view of your constellation of symptoms today    Return to the ED at once if you have any acute, urgent, emergent, or significant symptoms as discussed

## 2019-12-06 ENCOUNTER — LAB (OUTPATIENT)
Dept: LAB | Facility: HOSPITAL | Age: 34
End: 2019-12-06

## 2019-12-06 ENCOUNTER — TRANSCRIBE ORDERS (OUTPATIENT)
Dept: LAB | Facility: HOSPITAL | Age: 34
End: 2019-12-06

## 2019-12-06 DIAGNOSIS — T78.40XA ALLERGIC REACTION TO DRUG, INITIAL ENCOUNTER: ICD-10-CM

## 2019-12-06 DIAGNOSIS — T45.8X5A ADVERSE EFFECT OF BISPHOSPHONATE, INITIAL ENCOUNTER: ICD-10-CM

## 2019-12-06 DIAGNOSIS — T45.8X5A ADVERSE EFFECT OF BISPHOSPHONATE, INITIAL ENCOUNTER: Primary | ICD-10-CM

## 2019-12-06 DIAGNOSIS — J30.1 ALLERGIC RHINITIS DUE TO POLLEN, UNSPECIFIED SEASONALITY: ICD-10-CM

## 2019-12-06 DIAGNOSIS — J30.89 NON-SEASONAL ALLERGIC RHINITIS, UNSPECIFIED TRIGGER: ICD-10-CM

## 2019-12-06 DIAGNOSIS — J30.1 NON-SEASONAL ALLERGIC RHINITIS DUE TO POLLEN: ICD-10-CM

## 2019-12-06 LAB
BASOPHILS # BLD AUTO: 0.04 10*3/MM3 (ref 0–0.2)
BASOPHILS NFR BLD AUTO: 0.4 % (ref 0–1.5)
DEPRECATED RDW RBC AUTO: 43.7 FL (ref 37–54)
EOSINOPHIL # BLD AUTO: 0.15 10*3/MM3 (ref 0–0.4)
EOSINOPHIL NFR BLD AUTO: 1.7 % (ref 0.3–6.2)
ERYTHROCYTE [DISTWIDTH] IN BLOOD BY AUTOMATED COUNT: 14.2 % (ref 12.3–15.4)
HCT VFR BLD AUTO: 36.1 % (ref 34–46.6)
HGB BLD-MCNC: 11.7 G/DL (ref 12–15.9)
IMM GRANULOCYTES # BLD AUTO: 0.04 10*3/MM3 (ref 0–0.05)
IMM GRANULOCYTES NFR BLD AUTO: 0.4 % (ref 0–0.5)
LYMPHOCYTES # BLD AUTO: 2 10*3/MM3 (ref 0.7–3.1)
LYMPHOCYTES NFR BLD AUTO: 22.2 % (ref 19.6–45.3)
MCH RBC QN AUTO: 27.7 PG (ref 26.6–33)
MCHC RBC AUTO-ENTMCNC: 32.4 G/DL (ref 31.5–35.7)
MCV RBC AUTO: 85.3 FL (ref 79–97)
MONOCYTES # BLD AUTO: 0.68 10*3/MM3 (ref 0.1–0.9)
MONOCYTES NFR BLD AUTO: 7.5 % (ref 5–12)
NEUTROPHILS # BLD AUTO: 6.11 10*3/MM3 (ref 1.7–7)
NEUTROPHILS NFR BLD AUTO: 67.8 % (ref 42.7–76)
NRBC BLD AUTO-RTO: 0 /100 WBC (ref 0–0.2)
PLATELET # BLD AUTO: 270 10*3/MM3 (ref 140–450)
PMV BLD AUTO: 11.4 FL (ref 6–12)
RBC # BLD AUTO: 4.23 10*6/MM3 (ref 3.77–5.28)
WBC NRBC COR # BLD: 9.02 10*3/MM3 (ref 3.4–10.8)

## 2019-12-06 PROCEDURE — 86003 ALLG SPEC IGE CRUDE XTRC EA: CPT

## 2019-12-06 PROCEDURE — 36415 COLL VENOUS BLD VENIPUNCTURE: CPT

## 2019-12-06 PROCEDURE — 85025 COMPLETE CBC W/AUTO DIFF WBC: CPT

## 2019-12-06 PROCEDURE — 83520 IMMUNOASSAY QUANT NOS NONAB: CPT

## 2019-12-06 PROCEDURE — 86008 ALLG SPEC IGE RECOMB EA: CPT

## 2019-12-09 LAB — TRYPTASE SERPL-MCNC: 3.5 UG/L (ref 2.2–13.2)

## 2019-12-12 LAB
ALPHA GAL IGE: <0.1 KU/L
BEEF IGE QN: <0.1 KU/L
LAMB IGE QN: <0.1 KU/L
Lab: 0
PORK IGE: <0.1 KU/L

## 2020-01-25 ENCOUNTER — HOSPITAL ENCOUNTER (EMERGENCY)
Facility: HOSPITAL | Age: 35
Discharge: HOME | End: 2020-01-25
Attending: EMERGENCY MEDICINE | Admitting: EMERGENCY MEDICINE

## 2020-01-25 ENCOUNTER — APPOINTMENT (OUTPATIENT)
Dept: CT IMAGING | Facility: HOSPITAL | Age: 35
End: 2020-01-25

## 2020-01-25 VITALS
DIASTOLIC BLOOD PRESSURE: 87 MMHG | OXYGEN SATURATION: 95 % | WEIGHT: 227 LBS | HEART RATE: 73 BPM | BODY MASS INDEX: 45.76 KG/M2 | SYSTOLIC BLOOD PRESSURE: 148 MMHG | RESPIRATION RATE: 14 BRPM | TEMPERATURE: 97.8 F | HEIGHT: 59 IN

## 2020-01-25 DIAGNOSIS — G43.909 MIGRAINE SYNDROME: Primary | ICD-10-CM

## 2020-01-25 LAB
ALBUMIN SERPL-MCNC: 4.2 G/DL (ref 3.5–5.2)
ALBUMIN/GLOB SERPL: 1.2 G/DL
ALP SERPL-CCNC: 92 U/L (ref 39–117)
ALT SERPL W P-5'-P-CCNC: 17 U/L (ref 1–33)
ANION GAP SERPL CALCULATED.3IONS-SCNC: 12 MMOL/L (ref 5–15)
AST SERPL-CCNC: 20 U/L (ref 1–32)
BASOPHILS # BLD AUTO: 0.04 10*3/MM3 (ref 0–0.2)
BASOPHILS NFR BLD AUTO: 0.4 % (ref 0–1.5)
BILIRUB SERPL-MCNC: 0.2 MG/DL (ref 0.2–1.2)
BUN BLD-MCNC: 11 MG/DL (ref 6–20)
BUN/CREAT SERPL: 16.7 (ref 7–25)
CALCIUM SPEC-SCNC: 9.3 MG/DL (ref 8.6–10.5)
CHLORIDE SERPL-SCNC: 101 MMOL/L (ref 98–107)
CO2 SERPL-SCNC: 23 MMOL/L (ref 22–29)
CREAT BLD-MCNC: 0.66 MG/DL (ref 0.57–1)
DEPRECATED RDW RBC AUTO: 45.7 FL (ref 37–54)
EOSINOPHIL # BLD AUTO: 0.11 10*3/MM3 (ref 0–0.4)
EOSINOPHIL NFR BLD AUTO: 1.2 % (ref 0.3–6.2)
ERYTHROCYTE [DISTWIDTH] IN BLOOD BY AUTOMATED COUNT: 14.3 % (ref 12.3–15.4)
GFR SERPL CREATININE-BSD FRML MDRD: 101 ML/MIN/1.73
GLOBULIN UR ELPH-MCNC: 3.5 GM/DL
GLUCOSE BLD-MCNC: 115 MG/DL (ref 65–99)
HCT VFR BLD AUTO: 34.3 % (ref 34–46.6)
HGB BLD-MCNC: 10.9 G/DL (ref 12–15.9)
IMM GRANULOCYTES # BLD AUTO: 0.03 10*3/MM3 (ref 0–0.05)
IMM GRANULOCYTES NFR BLD AUTO: 0.3 % (ref 0–0.5)
LYMPHOCYTES # BLD AUTO: 2.17 10*3/MM3 (ref 0.7–3.1)
LYMPHOCYTES NFR BLD AUTO: 24 % (ref 19.6–45.3)
MCH RBC QN AUTO: 27.7 PG (ref 26.6–33)
MCHC RBC AUTO-ENTMCNC: 31.8 G/DL (ref 31.5–35.7)
MCV RBC AUTO: 87.3 FL (ref 79–97)
MONOCYTES # BLD AUTO: 0.53 10*3/MM3 (ref 0.1–0.9)
MONOCYTES NFR BLD AUTO: 5.8 % (ref 5–12)
NEUTROPHILS # BLD AUTO: 6.18 10*3/MM3 (ref 1.7–7)
NEUTROPHILS NFR BLD AUTO: 68.3 % (ref 42.7–76)
NRBC BLD AUTO-RTO: 0 /100 WBC (ref 0–0.2)
PLATELET # BLD AUTO: 282 10*3/MM3 (ref 140–450)
PMV BLD AUTO: 10.5 FL (ref 6–12)
POTASSIUM BLD-SCNC: 3.9 MMOL/L (ref 3.5–5.2)
PROT SERPL-MCNC: 7.7 G/DL (ref 6–8.5)
RBC # BLD AUTO: 3.93 10*6/MM3 (ref 3.77–5.28)
SODIUM BLD-SCNC: 136 MMOL/L (ref 136–145)
WBC NRBC COR # BLD: 9.06 10*3/MM3 (ref 3.4–10.8)

## 2020-01-25 PROCEDURE — 25010000002 DIPHENHYDRAMINE PER 50 MG: Performed by: NURSE PRACTITIONER

## 2020-01-25 PROCEDURE — 25010000002 KETOROLAC TROMETHAMINE PER 15 MG: Performed by: NURSE PRACTITIONER

## 2020-01-25 PROCEDURE — 25010000002 METOCLOPRAMIDE PER 10 MG: Performed by: NURSE PRACTITIONER

## 2020-01-25 PROCEDURE — 96375 TX/PRO/DX INJ NEW DRUG ADDON: CPT

## 2020-01-25 PROCEDURE — 99284 EMERGENCY DEPT VISIT MOD MDM: CPT

## 2020-01-25 PROCEDURE — 80053 COMPREHEN METABOLIC PANEL: CPT | Performed by: NURSE PRACTITIONER

## 2020-01-25 PROCEDURE — 85025 COMPLETE CBC W/AUTO DIFF WBC: CPT | Performed by: NURSE PRACTITIONER

## 2020-01-25 PROCEDURE — 70450 CT HEAD/BRAIN W/O DYE: CPT

## 2020-01-25 PROCEDURE — 96365 THER/PROPH/DIAG IV INF INIT: CPT

## 2020-01-25 RX ORDER — METOCLOPRAMIDE HYDROCHLORIDE 5 MG/ML
10 INJECTION INTRAMUSCULAR; INTRAVENOUS ONCE
Status: COMPLETED | OUTPATIENT
Start: 2020-01-25 | End: 2020-01-25

## 2020-01-25 RX ORDER — DIPHENHYDRAMINE HYDROCHLORIDE 50 MG/ML
25 INJECTION INTRAMUSCULAR; INTRAVENOUS ONCE
Status: COMPLETED | OUTPATIENT
Start: 2020-01-25 | End: 2020-01-25

## 2020-01-25 RX ORDER — SODIUM CHLORIDE 0.9 % (FLUSH) 0.9 %
10 SYRINGE (ML) INJECTION AS NEEDED
Status: DISCONTINUED | OUTPATIENT
Start: 2020-01-25 | End: 2020-01-25 | Stop reason: HOSPADM

## 2020-01-25 RX ORDER — KETOROLAC TROMETHAMINE 15 MG/ML
15 INJECTION, SOLUTION INTRAMUSCULAR; INTRAVENOUS ONCE
Status: COMPLETED | OUTPATIENT
Start: 2020-01-25 | End: 2020-01-25

## 2020-01-25 RX ADMIN — SODIUM CHLORIDE 1000 ML: 9 INJECTION, SOLUTION INTRAVENOUS at 08:48

## 2020-01-25 RX ADMIN — VALPROATE SODIUM 500 MG: 100 INJECTION, SOLUTION INTRAVENOUS at 10:18

## 2020-01-25 RX ADMIN — METOCLOPRAMIDE 10 MG: 5 INJECTION, SOLUTION INTRAMUSCULAR; INTRAVENOUS at 08:47

## 2020-01-25 RX ADMIN — KETOROLAC TROMETHAMINE 15 MG: 15 INJECTION, SOLUTION INTRAMUSCULAR; INTRAVENOUS at 08:46

## 2020-01-25 RX ADMIN — DIPHENHYDRAMINE HYDROCHLORIDE 25 MG: 50 INJECTION INTRAMUSCULAR; INTRAVENOUS at 08:44

## 2020-01-25 NOTE — ED PROVIDER NOTES
"Subjective   Patient presents to the emergency department with complaint of migraine headache.  Patient states she awoke with migraine on Wednesday morning and took Imitrex followed by sleeping, giving her relief until she awoke.  This has recurred daily until yesterday where she consulted with her primary care provider who advised her to take 100 mg of Imitrex followed by repeat dosing.  Patient had temporary and partial relief of her headache.  She presents today with persistent discomfort on the left side of her head.  Patient recognizes this headache is similar in presentation but greater in intensity to her occasions of \"migraine\".  Patient states \"I just started having these headaches in June\".  She had MRI imaging of her brain which was inconclusive at that time.  Patient has no neurosensory deficits complaints or focal weakness.  She experiences nausea and photophobia without vomiting      History provided by:  Patient   used: No    Headache   Pain location:  L parietal  Quality:  Dull  Radiates to:  Does not radiate  Severity currently:  8/10  Severity at highest:  8/10  Onset quality:  Gradual  Duration:  4 days  Timing:  Intermittent  Progression:  Unchanged  Chronicity:  Recurrent  Similar to prior headaches: yes    Context: bright light    Relieved by:  Nothing  Worsened by:  Nothing  Ineffective treatments: Imitrex.  Associated symptoms: nausea and photophobia    Associated symptoms: no abdominal pain, no back pain, no blurred vision, no dizziness, no fever, no focal weakness, no hearing loss, no near-syncope, no numbness, no paresthesias, no seizures, no sore throat, no swollen glands, no syncope, no URI, no vomiting and no weakness        Review of Systems   Constitutional: Negative for fever.   HENT: Negative for hearing loss and sore throat.    Eyes: Positive for photophobia. Negative for blurred vision.   Cardiovascular: Negative for syncope and near-syncope. " "  Gastrointestinal: Positive for nausea. Negative for abdominal pain and vomiting.   Musculoskeletal: Negative for back pain.   Neurological: Positive for headaches. Negative for dizziness, focal weakness, seizures, weakness, numbness and paresthesias.   All other systems reviewed and are negative.      Past Medical History:   Diagnosis Date   • Bronchitis    • Diabetes in pregnancy     Gestational   • Infectious mononucleosis    • Migraines    • Ovarian cyst    • Wears glasses        Allergies   Allergen Reactions   • Adhesive Tape Other (See Comments)     \"Takes my skin off\"; hives   • Clindamycin/Lincomycin Other (See Comments)     Pill esophagitis   • Naproxen Swelling       Past Surgical History:   Procedure Laterality Date   • COLONOSCOPY     • DIAGNOSTIC LAPAROSCOPY N/A 1/4/2017    Procedure: DIAGNOSTIC LAPAROSCOPY, LAP BILATERAL SALPINGECTOMY;  Surgeon: Kaia Humphrey MD;  Location:  [facility];  Service:    • ENDOSCOPY N/A 3/21/2019    Procedure: EGD;  Surgeon: Wyatt Cool MD;  Location:  [facility];  Service: Gastroenterology   • SALPINGECTOMY     • WISDOM TOOTH EXTRACTION         Family History   Problem Relation Age of Onset   • Ulcerative colitis Mother    • Hypertension Mother    • Glaucoma Father    • Osteoarthritis Father    • Cancer Other    • Diabetes Other    • Heart attack Other    • Stroke Other        Social History     Socioeconomic History   • Marital status:      Spouse name: Not on file   • Number of children: Not on file   • Years of education: Not on file   • Highest education level: Not on file   Tobacco Use   • Smoking status: Never Smoker   • Smokeless tobacco: Never Used   Substance and Sexual Activity   • Alcohol use: Yes     Comment: 2 per week   • Drug use: No   • Sexual activity: Never           Objective   Physical Exam   Constitutional: She is oriented to person, place, and time. She appears well-developed and well-nourished. No distress.   HENT: "   Head: Normocephalic and atraumatic.   Mouth/Throat: Oropharynx is clear and moist.   Eyes: Pupils are equal, round, and reactive to light. Conjunctivae and EOM are normal.   Neck: Normal range of motion. Neck supple. No JVD present.   Cardiovascular: Normal rate and regular rhythm.   Pulmonary/Chest: Effort normal and breath sounds normal. No respiratory distress. She has no wheezes. She has no rales.   Abdominal: Soft. Bowel sounds are normal. She exhibits no distension. There is no rebound and no guarding.   Musculoskeletal: Normal range of motion. She exhibits no edema.   Neurological: She is alert and oriented to person, place, and time. No sensory deficit. Coordination normal.   Skin: Skin is warm and dry. Capillary refill takes less than 2 seconds. She is not diaphoretic.   Psychiatric: She has a normal mood and affect. Her behavior is normal. Judgment and thought content normal.   Nursing note and vitals reviewed.      Procedures           ED Course  ED Course as of Jan 25 1105   Sat Jan 25, 2020   0946 Patient has had only partial relief of her headache.  Second round of medications have been ordered.  Her vital signs are stable    [MS]   1104 Patient is feeling significant headache relief.  Discharge is pending.    [MS]      ED Course User Index  [MS] Dipika Wu, GISELLE      Recent Results (from the past 24 hour(s))   Comprehensive Metabolic Panel    Collection Time: 01/25/20  8:37 AM   Result Value Ref Range    Glucose 115 (H) 65 - 99 mg/dL    BUN 11 6 - 20 mg/dL    Creatinine 0.66 0.57 - 1.00 mg/dL    Sodium 136 136 - 145 mmol/L    Potassium 3.9 3.5 - 5.2 mmol/L    Chloride 101 98 - 107 mmol/L    CO2 23.0 22.0 - 29.0 mmol/L    Calcium 9.3 8.6 - 10.5 mg/dL    Total Protein 7.7 6.0 - 8.5 g/dL    Albumin 4.20 3.50 - 5.20 g/dL    ALT (SGPT) 17 1 - 33 U/L    AST (SGOT) 20 1 - 32 U/L    Alkaline Phosphatase 92 39 - 117 U/L    Total Bilirubin 0.2 0.2 - 1.2 mg/dL    eGFR Non African Amer 101 >60  mL/min/1.73    Globulin 3.5 gm/dL    A/G Ratio 1.2 g/dL    BUN/Creatinine Ratio 16.7 7.0 - 25.0    Anion Gap 12.0 5.0 - 15.0 mmol/L   CBC Auto Differential    Collection Time: 01/25/20  8:37 AM   Result Value Ref Range    WBC 9.06 3.40 - 10.80 10*3/mm3    RBC 3.93 3.77 - 5.28 10*6/mm3    Hemoglobin 10.9 (L) 12.0 - 15.9 g/dL    Hematocrit 34.3 34.0 - 46.6 %    MCV 87.3 79.0 - 97.0 fL    MCH 27.7 26.6 - 33.0 pg    MCHC 31.8 31.5 - 35.7 g/dL    RDW 14.3 12.3 - 15.4 %    RDW-SD 45.7 37.0 - 54.0 fl    MPV 10.5 6.0 - 12.0 fL    Platelets 282 140 - 450 10*3/mm3    Neutrophil % 68.3 42.7 - 76.0 %    Lymphocyte % 24.0 19.6 - 45.3 %    Monocyte % 5.8 5.0 - 12.0 %    Eosinophil % 1.2 0.3 - 6.2 %    Basophil % 0.4 0.0 - 1.5 %    Immature Grans % 0.3 0.0 - 0.5 %    Neutrophils, Absolute 6.18 1.70 - 7.00 10*3/mm3    Lymphocytes, Absolute 2.17 0.70 - 3.10 10*3/mm3    Monocytes, Absolute 0.53 0.10 - 0.90 10*3/mm3    Eosinophils, Absolute 0.11 0.00 - 0.40 10*3/mm3    Basophils, Absolute 0.04 0.00 - 0.20 10*3/mm3    Immature Grans, Absolute 0.03 0.00 - 0.05 10*3/mm3    nRBC 0.0 0.0 - 0.2 /100 WBC     Note: In addition to lab results from this visit, the labs listed above may include labs taken at another facility or during a different encounter within the last 24 hours. Please correlate lab times with ED admission and discharge times for further clarification of the services performed during this visit.    CT Head Without Contrast   Preliminary Result   No acute intracranial abnormality specifically no acute   intracranial hemorrhage.                Vitals:    01/25/20 0830 01/25/20 0900 01/25/20 1000 01/25/20 1027   BP: 133/84 121/72 (P) 112/64    BP Location:       Patient Position:       Pulse:    (P) 81   Resp:    (P) 14   Temp:       TempSrc:       SpO2: 95% 97% (P) 96%    Weight:       Height:         Medications   sodium chloride 0.9 % flush 10 mL (has no administration in time range)   valproate (DEPACON) 500 mg in sodium  chloride 0.9 % 50 mL IVPB (500 mg Intravenous New Bag 1/25/20 1018)   sodium chloride 0.9 % bolus 1,000 mL (0 mL Intravenous Stopped 1/25/20 1023)   ketorolac (TORADOL) injection 15 mg (15 mg Intravenous Given 1/25/20 0846)   metoclopramide (REGLAN) injection 10 mg (10 mg Intravenous Given 1/25/20 0847)   diphenhydrAMINE (BENADRYL) injection 25 mg (25 mg Intravenous Given 1/25/20 0844)     ECG/EMG Results (last 24 hours)     ** No results found for the last 24 hours. **        No orders to display                       [place] Coma Scale Score: 15                          MDM    Final diagnoses:   Migraine syndrome            Dipika Wu, GISELLE  01/25/20 5722

## 2020-01-25 NOTE — DISCHARGE INSTRUCTIONS
Home to rest.  Do not drive today.  Follow-up with your primary care provider to monitor your recovery.  Thank you

## 2020-05-15 ENCOUNTER — OFFICE VISIT (OUTPATIENT)
Dept: ORTHOPEDIC SURGERY | Facility: CLINIC | Age: 35
End: 2020-05-15

## 2020-05-15 VITALS — WEIGHT: 220.02 LBS | OXYGEN SATURATION: 98 % | HEART RATE: 94 BPM | BODY MASS INDEX: 44.36 KG/M2 | HEIGHT: 59 IN

## 2020-05-15 DIAGNOSIS — S92.355A CLOSED NONDISPLACED FRACTURE OF FIFTH METATARSAL BONE OF LEFT FOOT, INITIAL ENCOUNTER: Primary | ICD-10-CM

## 2020-05-15 PROCEDURE — 99213 OFFICE O/P EST LOW 20 MIN: CPT | Performed by: ORTHOPAEDIC SURGERY

## 2020-05-15 NOTE — PROGRESS NOTES
NEW PATIENT    Patient: Andreina Brady [place]  : 1985    Primary Care Provider: Deepti Ly MD    Requesting Provider: As above    Pain of the Left Foot      History    Chief Complaint: Left foot pain    History of Present Illness: Ms. Bo is here with a new problem.  About 2 weeks ago she developed pain on the lateral aspect of the left foot.  No history of injury.  She does not think she had any swelling or bruising.  She has had much more pain with activity, better with rest.  She has tried Tylenol and ice.  She put herself back in the tall boot that she has.  She reports the pain is 6 out of 10, much worse with weightbearing.  She had to work last night and was only able to do this with the boot.  She went to urgent treatment yesterday and an x-ray done shows no obvious fracture I looked at the films and the report.  She has a work excuse for modified duty and I think that is fine.    Current Outpatient Medications on File Prior to Visit   Medication Sig Dispense Refill   • diphenhydrAMINE (BENADRYL) 25 MG tablet Take 1 tablet by mouth Every 6 (Six) Hours As Needed for Itching. 30 tablet 0   • EPINEPHrine (EPIPEN) 0.3 MG/0.3ML solution auto-injector injection Inject into outer thigh for severe allergic reaction. Call 911 after use. May substitute only with M[employer] generic(NDC [address]-06) 2 each 2   • famotidine (PEPCID) 20 MG tablet Take 1 tablet by mouth 2 (Two) Times a Day. 14 tablet 0   • frovatriptan (FROVA) 2.5 MG tablet Take 1 tablet by mouth at the onset of headache. If recurs, may repeat every 2 hours. Max of 3 tabs in 24 hours. 9 tablet 2   • levocetirizine (XYZAL) 5 MG tablet Take 1 tablet by mouth Daily. 30 tablet 11   • melatonin 5 MG tablet tablet Take 5 mg by mouth At Night As Needed.     • methylPREDNISolone (MEDROL, MITCH,) 4 MG tablet as directed by package instructions 21 each 1   • olopatadine (PATANOL) 0.1 % ophthalmic solution Administer 1 drop to both eyes 2 (Two) Times a  "Day As Needed. 5 mL 6     No current facility-administered medications on file prior to visit.       Allergies   Allergen Reactions   • Adhesive Tape Other (See Comments)     \"Takes my skin off\"; hives   • Clindamycin/Lincomycin Other (See Comments)     Pill esophagitis   • Naproxen Swelling      Past Medical History:   Diagnosis Date   • Bronchitis    • Diabetes in pregnancy     Gestational   • Infectious mononucleosis    • Migraines    • Ovarian cyst    • Wears glasses      Past Surgical History:   Procedure Laterality Date   • COLONOSCOPY     • DIAGNOSTIC LAPAROSCOPY N/A 1/4/2017    Procedure: DIAGNOSTIC LAPAROSCOPY, LAP BILATERAL SALPINGECTOMY;  Surgeon: Kaia Humphrey MD;  Location:  [facility];  Service:    • ENDOSCOPY N/A 3/21/2019    Procedure: EGD;  Surgeon: Wyatt Cool MD;  Location:  [facility];  Service: Gastroenterology   • SALPINGECTOMY     • WISDOM TOOTH EXTRACTION       Family History   Problem Relation Age of Onset   • Ulcerative colitis Mother    • Hypertension Mother    • Glaucoma Father    • Osteoarthritis Father    • Cancer Other    • Diabetes Other    • Heart attack Other    • Stroke Other       Social History     Socioeconomic History   • Marital status:      Spouse name: Not on file   • Number of children: Not on file   • Years of education: Not on file   • Highest education level: Not on file   Tobacco Use   • Smoking status: Never Smoker   • Smokeless tobacco: Never Used   Substance and Sexual Activity   • Alcohol use: Yes     Comment: 2 per week   • Drug use: No   • Sexual activity: Never        Review of Systems   Constitutional: Negative.    HENT: Negative.    Eyes: Negative.    Respiratory: Negative.    Cardiovascular: Negative.    Gastrointestinal: Negative.    Endocrine: Negative.    Genitourinary: Negative.    Musculoskeletal: Positive for arthralgias.   Skin: Negative.    Allergic/Immunologic: Positive for environmental allergies.   Neurological: " "Negative.    Hematological: Negative.    Psychiatric/Behavioral: Negative.        The following portions of the patient's history were reviewed and updated as appropriate: allergies, current medications, past family history, past medical history, past social history, past surgical history and problem list.    Physical Exam:   Pulse 94   Ht 149.9 cm (59.02\")   Wt 99.8 kg (220 lb 0.3 oz)   SpO2 98%   BMI 44.41 kg/m²   GENERAL: Body habitus: obese    Lower extremity edema: Right: none; Left: none    Varicose veins:  Right: none; Left: none    Gait: antalgic     Mental Status:  awake and alert; oriented to person, place, and time    Voice:  clear  SKIN:  Lower extremity: Normal    Hair Growth(lower extremity):  Right:normal; Left:  normal  NAILS: Toenails: normal  HEENT: Head: Normocephalic, atraumatic,  without obvious abnormality.  eye: normal external eye, no icterus  ears:normal external ears  PULM:  Repiratory effort normal  CV:  Dorsalis Pedis:  Right: 2+; Left:2+    Posterior Tibial: Right:2+; Left:2+    Capillary Refill:  Brisk  MSK:        Tibia: Left:  non tender      Ankle:  ; Left:  non tender      Foot:  ; Left:  Tender along the lateral fifth metatarsal, no swelling      NEURO: Heel Walking:  Right:  normal; Left:  painful    Toe Walking:  Right:  normal; Left:  limited ability, painful         Lower extremity sensation: intact       Motor Function: all 5/5         Medical Decision Making    Data Review:   reviewed radiology images, reviewed radiology results and reviewed outside records    Assessment and Plan/ Diagnosis/Treatment options:   1. Closed nondisplaced fracture of fifth metatarsal bone of left foot, initial encounter  I am concerned that she may have a 5th metatarsal stress fracture.  Her history is consistent with this, it also might be tendinitis.  We need to do an MRI to evaluate.  The pain has not resolved with medication, immobilization, activity modification.  I will see her again " following the MRI.  - MRI Foot Left Without Contrast; Future            Radiology Ordered []  Radiology Reports Reviewed []      Radiology Images Reviewed []   Labs Reviewed []    Labs Ordered []   PCP Records Reviewed []    Provider Records Reviewed []    ER Records Reviewed []    Hospital Records Reviewed []    History Obtained From Family []    Phone conversation with Provider []    Records Requested []

## 2020-05-24 ENCOUNTER — HOSPITAL ENCOUNTER (EMERGENCY)
Facility: HOSPITAL | Age: 35
Discharge: HOME | End: 2020-05-24
Attending: EMERGENCY MEDICINE | Admitting: EMERGENCY MEDICINE

## 2020-05-24 ENCOUNTER — APPOINTMENT (OUTPATIENT)
Dept: CT IMAGING | Facility: HOSPITAL | Age: 35
End: 2020-05-24

## 2020-05-24 VITALS
WEIGHT: 220 LBS | HEART RATE: 77 BPM | TEMPERATURE: 98.4 F | SYSTOLIC BLOOD PRESSURE: 102 MMHG | HEIGHT: 59 IN | RESPIRATION RATE: 16 BRPM | BODY MASS INDEX: 44.35 KG/M2 | DIASTOLIC BLOOD PRESSURE: 57 MMHG | OXYGEN SATURATION: 99 %

## 2020-05-24 DIAGNOSIS — R10.11 RUQ PAIN: ICD-10-CM

## 2020-05-24 DIAGNOSIS — N83.201 RIGHT OVARIAN CYST: ICD-10-CM

## 2020-05-24 DIAGNOSIS — R10.9 ACUTE ABDOMINAL PAIN: Primary | ICD-10-CM

## 2020-05-24 LAB
ALBUMIN SERPL-MCNC: 4.4 G/DL (ref 3.5–5.2)
ALBUMIN/GLOB SERPL: 1.6 G/DL
ALP SERPL-CCNC: 82 U/L (ref 39–117)
ALT SERPL W P-5'-P-CCNC: 14 U/L (ref 1–33)
ANION GAP SERPL CALCULATED.3IONS-SCNC: 12 MMOL/L (ref 5–15)
AST SERPL-CCNC: 20 U/L (ref 1–32)
B-HCG UR QL: NEGATIVE
BASOPHILS # BLD AUTO: 0.05 10*3/MM3 (ref 0–0.2)
BASOPHILS NFR BLD AUTO: 0.5 % (ref 0–1.5)
BILIRUB SERPL-MCNC: 0.3 MG/DL (ref 0.2–1.2)
BILIRUB UR QL STRIP: NEGATIVE
BUN BLD-MCNC: 13 MG/DL (ref 6–20)
BUN/CREAT SERPL: 18.8 (ref 7–25)
CALCIUM SPEC-SCNC: 9.2 MG/DL (ref 8.6–10.5)
CHLORIDE SERPL-SCNC: 103 MMOL/L (ref 98–107)
CLARITY UR: CLEAR
CO2 SERPL-SCNC: 22 MMOL/L (ref 22–29)
COLOR UR: YELLOW
CREAT BLD-MCNC: 0.69 MG/DL (ref 0.57–1)
DEPRECATED RDW RBC AUTO: 52.5 FL (ref 37–54)
EOSINOPHIL # BLD AUTO: 0.24 10*3/MM3 (ref 0–0.4)
EOSINOPHIL NFR BLD AUTO: 2.5 % (ref 0.3–6.2)
ERYTHROCYTE [DISTWIDTH] IN BLOOD BY AUTOMATED COUNT: 15.9 % (ref 12.3–15.4)
GFR SERPL CREATININE-BSD FRML MDRD: 96 ML/MIN/1.73
GLOBULIN UR ELPH-MCNC: 2.7 GM/DL
GLUCOSE BLD-MCNC: 95 MG/DL (ref 65–99)
GLUCOSE UR STRIP-MCNC: NEGATIVE MG/DL
HCT VFR BLD AUTO: 35.9 % (ref 34–46.6)
HGB BLD-MCNC: 11.4 G/DL (ref 12–15.9)
HGB UR QL STRIP.AUTO: NEGATIVE
HOLD SPECIMEN: NORMAL
HOLD SPECIMEN: NORMAL
IMM GRANULOCYTES # BLD AUTO: 0.06 10*3/MM3 (ref 0–0.05)
IMM GRANULOCYTES NFR BLD AUTO: 0.6 % (ref 0–0.5)
INTERNAL NEGATIVE CONTROL: NORMAL
INTERNAL POSITIVE CONTROL: POSITIVE
KETONES UR QL STRIP: NEGATIVE
LEUKOCYTE ESTERASE UR QL STRIP.AUTO: NEGATIVE
LIPASE SERPL-CCNC: 25 U/L (ref 13–60)
LYMPHOCYTES # BLD AUTO: 2.83 10*3/MM3 (ref 0.7–3.1)
LYMPHOCYTES NFR BLD AUTO: 29 % (ref 19.6–45.3)
Lab: NORMAL
MCH RBC QN AUTO: 28.6 PG (ref 26.6–33)
MCHC RBC AUTO-ENTMCNC: 31.8 G/DL (ref 31.5–35.7)
MCV RBC AUTO: 90.2 FL (ref 79–97)
MONOCYTES # BLD AUTO: 0.69 10*3/MM3 (ref 0.1–0.9)
MONOCYTES NFR BLD AUTO: 7.1 % (ref 5–12)
NEUTROPHILS # BLD AUTO: 5.9 10*3/MM3 (ref 1.7–7)
NEUTROPHILS NFR BLD AUTO: 60.3 % (ref 42.7–76)
NITRITE UR QL STRIP: NEGATIVE
NRBC BLD AUTO-RTO: 0 /100 WBC (ref 0–0.2)
PH UR STRIP.AUTO: <=5 [PH] (ref 5–8)
PLATELET # BLD AUTO: 247 10*3/MM3 (ref 140–450)
PMV BLD AUTO: 10.5 FL (ref 6–12)
POTASSIUM BLD-SCNC: 3.9 MMOL/L (ref 3.5–5.2)
PROT SERPL-MCNC: 7.1 G/DL (ref 6–8.5)
PROT UR QL STRIP: NEGATIVE
RBC # BLD AUTO: 3.98 10*6/MM3 (ref 3.77–5.28)
SODIUM BLD-SCNC: 137 MMOL/L (ref 136–145)
SP GR UR STRIP: 1.03 (ref 1–1.03)
UROBILINOGEN UR QL STRIP: ABNORMAL
WBC NRBC COR # BLD: 9.77 10*3/MM3 (ref 3.4–10.8)
WHOLE BLOOD HOLD SPECIMEN: NORMAL
WHOLE BLOOD HOLD SPECIMEN: NORMAL

## 2020-05-24 PROCEDURE — 81003 URINALYSIS AUTO W/O SCOPE: CPT | Performed by: EMERGENCY MEDICINE

## 2020-05-24 PROCEDURE — 96375 TX/PRO/DX INJ NEW DRUG ADDON: CPT

## 2020-05-24 PROCEDURE — 80053 COMPREHEN METABOLIC PANEL: CPT | Performed by: EMERGENCY MEDICINE

## 2020-05-24 PROCEDURE — 25010000002 ONDANSETRON PER 1 MG: Performed by: EMERGENCY MEDICINE

## 2020-05-24 PROCEDURE — 25010000002 IOPAMIDOL 61 % SOLUTION: Performed by: EMERGENCY MEDICINE

## 2020-05-24 PROCEDURE — 83690 ASSAY OF LIPASE: CPT | Performed by: EMERGENCY MEDICINE

## 2020-05-24 PROCEDURE — 85025 COMPLETE CBC W/AUTO DIFF WBC: CPT | Performed by: EMERGENCY MEDICINE

## 2020-05-24 PROCEDURE — 99283 EMERGENCY DEPT VISIT LOW MDM: CPT

## 2020-05-24 PROCEDURE — 81025 URINE PREGNANCY TEST: CPT | Performed by: EMERGENCY MEDICINE

## 2020-05-24 PROCEDURE — 74177 CT ABD & PELVIS W/CONTRAST: CPT

## 2020-05-24 PROCEDURE — 25010000002 HYDROMORPHONE PER 4 MG: Performed by: EMERGENCY MEDICINE

## 2020-05-24 PROCEDURE — 96374 THER/PROPH/DIAG INJ IV PUSH: CPT

## 2020-05-24 RX ORDER — ALUMINA, MAGNESIA, AND SIMETHICONE 2400; 2400; 240 MG/30ML; MG/30ML; MG/30ML
15 SUSPENSION ORAL ONCE
Status: COMPLETED | OUTPATIENT
Start: 2020-05-24 | End: 2020-05-24

## 2020-05-24 RX ORDER — FAMOTIDINE 20 MG/1
20 TABLET, FILM COATED ORAL 2 TIMES DAILY
Qty: 30 TABLET | Refills: 0 | Status: SHIPPED | OUTPATIENT
Start: 2020-05-24 | End: 2020-06-16

## 2020-05-24 RX ORDER — HYDROMORPHONE HYDROCHLORIDE 1 MG/ML
0.5 INJECTION, SOLUTION INTRAMUSCULAR; INTRAVENOUS; SUBCUTANEOUS ONCE
Status: COMPLETED | OUTPATIENT
Start: 2020-05-24 | End: 2020-05-24

## 2020-05-24 RX ORDER — SODIUM CHLORIDE 0.9 % (FLUSH) 0.9 %
10 SYRINGE (ML) INJECTION AS NEEDED
Status: DISCONTINUED | OUTPATIENT
Start: 2020-05-24 | End: 2020-05-24 | Stop reason: HOSPADM

## 2020-05-24 RX ORDER — LIDOCAINE HYDROCHLORIDE 20 MG/ML
15 SOLUTION OROPHARYNGEAL ONCE
Status: COMPLETED | OUTPATIENT
Start: 2020-05-24 | End: 2020-05-24

## 2020-05-24 RX ORDER — ONDANSETRON 2 MG/ML
4 INJECTION INTRAMUSCULAR; INTRAVENOUS ONCE
Status: COMPLETED | OUTPATIENT
Start: 2020-05-24 | End: 2020-05-24

## 2020-05-24 RX ADMIN — HYDROMORPHONE HYDROCHLORIDE 0.5 MG: 1 INJECTION, SOLUTION INTRAMUSCULAR; INTRAVENOUS; SUBCUTANEOUS at 08:23

## 2020-05-24 RX ADMIN — ALUMINUM HYDROXIDE, MAGNESIUM HYDROXIDE, AND DIMETHICONE 15 ML: 400; 400; 40 SUSPENSION ORAL at 11:23

## 2020-05-24 RX ADMIN — LIDOCAINE HYDROCHLORIDE 15 ML: 20 SOLUTION ORAL; TOPICAL at 11:24

## 2020-05-24 RX ADMIN — ONDANSETRON 4 MG: 2 INJECTION INTRAMUSCULAR; INTRAVENOUS at 08:21

## 2020-05-24 RX ADMIN — IOPAMIDOL 85 ML: 612 INJECTION, SOLUTION INTRAVENOUS at 08:32

## 2020-05-24 RX ADMIN — SODIUM CHLORIDE 1000 ML: 9 INJECTION, SOLUTION INTRAVENOUS at 08:20

## 2020-05-24 NOTE — ED PROVIDER NOTES
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  She reports she still has her gallbladder.  She does have a history of gastritis esophagitis and ulcers.  She has not been able to eat since this is been hurting.  She has noticed that over the last several weeks eating does lead to an increased amount of heartburn.  She has not had a fever chest pain difficulty breathing or cough.      Abdominal Pain   Pain location:  RUQ  Pain quality: aching and cramping    Pain radiates to:  Does not radiate  Pain severity:  Moderate  Onset quality:  Sudden  Duration:  1 day  Timing:  Constant  Progression:  Unchanged  Chronicity:  New  Relieved by:  Nothing  Worsened by:  Eating and palpation  Ineffective treatments:  None tried  Associated symptoms: no chest pain, no chills, no constipation, no cough, no diarrhea, no dysuria, no fever, no hematuria, no nausea, no shortness of breath, no sore throat and no vomiting        Review of Systems   Constitutional: Negative for chills, diaphoresis and fever.   HENT: Negative for congestion and sore throat.    Respiratory: Negative for cough, choking, chest tightness, shortness of breath and wheezing.    Cardiovascular: Negative for chest pain and leg swelling.   Gastrointestinal: Positive for abdominal pain. Negative for abdominal distention, anal bleeding, blood in stool, constipation, diarrhea, nausea and vomiting.   Genitourinary: Negative for difficulty urinating, dysuria, flank pain, frequency, hematuria and urgency.   All other systems reviewed and are negative.      Past Medical History:   Diagnosis Date   • Bronchitis    • Diabetes in pregnancy     Gestational   • Infectious mononucleosis    • Migraines    • Ovarian cyst    • Wears glasses        Allergies   Allergen Reactions   • Adhesive Tape Other (See Comments)     \"Takes my skin off\"; hives   • Clindamycin/Lincomycin Other (See Comments)     " Pill esophagitis   • Naproxen Swelling       Past Surgical History:   Procedure Laterality Date   • COLONOSCOPY     • DIAGNOSTIC LAPAROSCOPY N/A 1/4/2017    Procedure: DIAGNOSTIC LAPAROSCOPY, LAP BILATERAL SALPINGECTOMY;  Surgeon: Kaia Humphrey MD;  Location:  [facility];  Service:    • ENDOSCOPY N/A 3/21/2019    Procedure: EGD;  Surgeon: Wyatt Cool MD;  Location: [facility];  Service: Gastroenterology   • SALPINGECTOMY     • WISDOM TOOTH EXTRACTION         Family History   Problem Relation Age of Onset   • Ulcerative colitis Mother    • Hypertension Mother    • Glaucoma Father    • Osteoarthritis Father    • Cancer Other    • Diabetes Other    • Heart attack Other    • Stroke Other        Social History     Socioeconomic History   • Marital status:      Spouse name: Not on file   • Number of children: Not on file   • Years of education: Not on file   • Highest education level: Not on file   Tobacco Use   • Smoking status: Never Smoker   • Smokeless tobacco: Never Used   Substance and Sexual Activity   • Alcohol use: Yes     Comment: 2 per week   • Drug use: No   • Sexual activity: Never           Objective   Physical Exam   Constitutional: She is oriented to person, place, and time. She appears well-developed and well-nourished.   HENT:   Head: Normocephalic and atraumatic.   Right Ear: External ear normal.   Left Ear: External ear normal.   Nose: Nose normal.   Mouth/Throat: Oropharynx is clear and moist.   Eyes: Pupils are equal, round, and reactive to light. Conjunctivae and EOM are normal.   Neck: Normal range of motion. Neck supple.   Cardiovascular: Normal rate, regular rhythm, normal heart sounds and intact distal pulses.   Pulmonary/Chest: Effort normal and breath sounds normal.   Abdominal: Soft. Bowel sounds are normal. There is tenderness in the right upper quadrant.   Musculoskeletal: Normal range of motion.   Neurological: She is alert and oriented to person, place, and  time.   Skin: Skin is warm and dry.   Psychiatric: She has a normal mood and affect. Her behavior is normal. Judgment and thought content normal.       Procedures           ED Course  ED Course as of May 24 1124   Sun May 24, 2020   1117 I had a long conversation with the patient.  I discussed the results of her CAT scan as well as all of her blood work.  She does have a history of esophagitis.  I think her gallbladder does need to be investigated further perhaps with a HIDA scan.  She follows Dr. Travon PIERSON.  Also recommend primary care follow-up.  Patient will aware the signs it worsens.  All thankful and agreeable.  She was on a PPI in the past but she reports her regular doctor not want her to be on it for very long related to the side effects.  I will recommend some Pepcid she has Zofran at home.  All thankful and agreeable.    [JM]      ED Course User Index  [JM] Raghav Gooden APRN           CT Abdomen Pelvis With Contrast   Preliminary Result   1. Approximately 2 cm right ovarian cyst.   2.. No other significant intra-abdominal or intrapelvic pathology is   identified.                                              [facility]    Final diagnoses:   Acute abdominal pain   RUQ pain   Right ovarian cyst            Raghav Gooden APRN  05/24/20 1124

## 2020-05-27 ENCOUNTER — OFFICE VISIT (OUTPATIENT)
Dept: ORTHOPEDIC SURGERY | Facility: CLINIC | Age: 35
End: 2020-05-27

## 2020-05-27 VITALS — HEART RATE: 93 BPM | BODY MASS INDEX: 44.36 KG/M2 | HEIGHT: 59 IN | WEIGHT: 220.02 LBS | OXYGEN SATURATION: 98 %

## 2020-05-27 DIAGNOSIS — M76.72 PERONEAL TENDINITIS OF LEFT LOWER EXTREMITY: Primary | ICD-10-CM

## 2020-05-27 PROCEDURE — 99212 OFFICE O/P EST SF 10 MIN: CPT | Performed by: ORTHOPAEDIC SURGERY

## 2020-05-27 NOTE — PROGRESS NOTES
"ESTABLISHED PATIENT    Patient: Andreina Brady [place]  : 1985    Primary Care Provider: Deepti Ly MD    Requesting Provider: As above    Follow-up (Post MRI 20 - Closed nondisplaced fracture of fifth metatarsal bone of left foot)      History    Chief Complaint: left foot pain    History of Present Illness: she returns after MRI left foot, I looked at report and films, 2020.  It shows mild peroneus longus tendinitis, no splits, mild fluid around tendon.      Current Outpatient Medications on File Prior to Visit   Medication Sig Dispense Refill   • diphenhydrAMINE (BENADRYL) 25 MG tablet Take 1 tablet by mouth Every 6 (Six) Hours As Needed for Itching. 30 tablet 0   • EPINEPHrine (EPIPEN) 0.3 MG/0.3ML solution auto-injector injection Inject into outer thigh for severe allergic reaction. Call 911 after use. May substitute only with Mylan generic(NDC [address]-58) 2 each 2   • famotidine (PEPCID) 20 MG tablet Take 1 tablet by mouth 2 (Two) Times a Day. 30 tablet 0   • frovatriptan (FROVA) 2.5 MG tablet Take 1 tablet by mouth at the onset of headache. If recurs, may repeat every 2 hours. Max of 3 tabs in 24 hours. 9 tablet 2   • levocetirizine (XYZAL) 5 MG tablet Take 1 tablet by mouth Daily. 30 tablet 11   • melatonin 5 MG tablet tablet Take 5 mg by mouth At Night As Needed.     • olopatadine (PATANOL) 0.1 % ophthalmic solution Administer 1 drop to both eyes 2 (Two) Times a Day As Needed. 5 mL 6     No current facility-administered medications on file prior to visit.       Allergies   Allergen Reactions   • Adhesive Tape Other (See Comments)     \"Takes my skin off\"; hives   • Clindamycin/Lincomycin Other (See Comments)     Pill esophagitis   • Naproxen Swelling      Past Medical History:   Diagnosis Date   • Bronchitis    • Diabetes in pregnancy     Gestational   • Infectious mononucleosis    • Migraines    • Ovarian cyst    • Wears glasses      Past Surgical History:   Procedure Laterality " "Date   • COLONOSCOPY     • DIAGNOSTIC LAPAROSCOPY N/A 1/4/2017    Procedure: DIAGNOSTIC LAPAROSCOPY, LAP BILATERAL SALPINGECTOMY;  Surgeon: Kaia Humphrey MD;  Location:  [facility];  Service:    • ENDOSCOPY N/A 3/21/2019    Procedure: EGD;  Surgeon: Wyatt Cool MD;  Location:  [facility];  Service: Gastroenterology   • SALPINGECTOMY     • WISDOM TOOTH EXTRACTION       Family History   Problem Relation Age of Onset   • Ulcerative colitis Mother    • Hypertension Mother    • Glaucoma Father    • Osteoarthritis Father    • Cancer Other    • Diabetes Other    • Heart attack Other    • Stroke Other       Social History     Socioeconomic History   • Marital status:      Spouse name: Not on file   • Number of children: Not on file   • Years of education: Not on file   • Highest education level: Not on file   Tobacco Use   • Smoking status: Never Smoker   • Smokeless tobacco: Never Used   Substance and Sexual Activity   • Alcohol use: Yes     Comment: 2 per week   • Drug use: No   • Sexual activity: Never        Review of Systems   Constitutional: Negative.    HENT: Negative.    Eyes: Negative.    Respiratory: Negative.    Cardiovascular: Negative.    Gastrointestinal: Negative.    Endocrine: Negative.    Genitourinary: Negative.    Musculoskeletal: Positive for joint swelling.   Skin: Negative.    Allergic/Immunologic: Negative.    Neurological: Negative.    Hematological: Negative.    Psychiatric/Behavioral: Negative.        The following portions of the patient's history were reviewed and updated as appropriate: allergies, current medications, past family history, past medical history, past social history, past surgical history and problem list.    Physical Exam:   Pulse 93   Ht 149.9 cm (59.02\")   Wt 99.8 kg (220 lb 0.3 oz)   SpO2 98%   BMI 44.41 kg/m²   GENERAL: Gait: antalgic       MSK:          Foot:   Left:  temder 5th metatarsal and cuboid    NEURO Sensation:  intact     Medical " Decision Making    Data Review:   reviewed radiology images and reviewed radiology results    Assessment/Plan/Diagnosis/Treatment Options:   1. Peroneal tendinitis of left lower extremity  The MRI is very helpful, she has mild peroneal tendinitis.  Continue in the boot, continue limit walking and standing at work, 15min/hour, return 6 weeks when I expect we can start PT

## 2020-06-02 ENCOUNTER — APPOINTMENT (OUTPATIENT)
Dept: MRI IMAGING | Facility: HOSPITAL | Age: 35
End: 2020-06-02

## 2020-06-02 ENCOUNTER — TELEMEDICINE (OUTPATIENT)
Dept: GASTROENTEROLOGY | Facility: CLINIC | Age: 35
End: 2020-06-02

## 2020-06-02 DIAGNOSIS — T50.905A PILL ESOPHAGITIS: ICD-10-CM

## 2020-06-02 DIAGNOSIS — K21.9 CHRONIC GERD: Primary | ICD-10-CM

## 2020-06-02 DIAGNOSIS — K20.80 PILL ESOPHAGITIS: ICD-10-CM

## 2020-06-02 PROCEDURE — 99213 OFFICE O/P EST LOW 20 MIN: CPT | Performed by: NURSE PRACTITIONER

## 2020-06-02 NOTE — PROGRESS NOTES
GASTROENTEROLOGY OUTPATIENT ESTABLISHED PATIENT NOTE Video/Telemedicine Visit  Patient: ANDREINA TORRES : 1985  Date of Service: 2020  CC:abd pain     Assessment/Plan                                             ASSESSMENT & PLANS     Chronic GERD Worsening sx  Hx of Pill esophagitis  Increased Pepcid from 20 mg once daily to 40 mg once daily.  Pt is instructed to call me back when she runs out of Pepcid (which would be about 10 days).  · If sx controlled w/ increased dose of Pepcid, will give pt a new rx to continue   · If sx not controlled, will start pt back on a PPI    · Anti-reflux measures: Avoiding lying down immediately after eating (wait at least 3 hours after meals), elevate the head of the bed at night, avoid spicy foods, avoid mints, avoid caffeine, avoid nicotine and work on getting to a healthy weight.    Follow Up:  6 months     DISCUSSION: The above plan was delineated in details with pt and all questions and concerns were answered.  Patient is also given contact information.  Patient is to return as scheduled or sooner if new problems arise.     Subjective                                                     SUBJECTIVE   History of Present Illness  Ms. Andreina Torres is a 34 y.o. female presents as a telemedicine visit via video for a f/u. Pt was last seen in 2019    In 2019, pt had allergic reaction to Naproxen.  As the result, she stopped all her meds.  She has since slowly resumed all her meds, but she did not resume her Protonix d/t unclear reasons. Pt went [facility] ER last week for acute onset of postprandial RUQ abd pain and  increased amount of heartburn CT and labs unrevealing.  Pepcid (30-day supply) just started last week and has noticed some, but sub optimal, sx improvement    No change in bowel habits. Pt denies dark black stools or bright red blood in the stools, in the toilet bowl, or on the toilet tissue.  No diarrhea or constipation.   Stool character and consistency have been normal.    EGD, done on 3/21/19 by Dr Cool d/t dysphagia, showed 2 cm hiatal hernia, LA grade B and pill esophagitis,   Colonoscopy, done on 10/19/2016 by Dr. Cool due to left lower quadrant abdominal pain and diarrhea, showed cecal diverticulosis, but otherwise normal colonoscopy to the terminal ileum.  Random colon biopsy showed mild proprial edema, but no histologic abnormality.    Review of Systems   ROS:Review of Systems  Objective                                                           OBJECTIVE   Allergy: Pt is allergic to adhesive tape; clindamycin/lincomycin; and naproxen.  MEDS: •  diphenhydrAMINE (BENADRYL) 25 MG tablet, Take 1 tablet by mouth Every 6 (Six) Hours As Needed for Itching., Disp: 30 tablet, Rfl: 0  •  EPINEPHrine (EPIPEN) 0.3 MG/0.3ML solution auto-injector injection, Inject into outer thigh for severe allergic reaction. Call 911 after use. May substitute only with Mylan generic(NDC [address]-24), Disp: 2 each, Rfl: 2  •  famotidine (PEPCID) 20 MG tablet, Take 1 tablet by mouth 2 (Two) Times a Day., Disp: 30 tablet, Rfl: 0  •  frovatriptan (FROVA) 2.5 MG tablet, Take 1 tablet by mouth at the onset of headache. If recurs, may repeat every 2 hours. Max of 3 tabs in 24 hours., Disp: 9 tablet, Rfl: 2  •  levocetirizine (XYZAL) 5 MG tablet, Take 1 tablet by mouth Daily., Disp: 30 tablet, Rfl: 11  •  melatonin 5 MG tablet tablet, Take 5 mg by mouth At Night As Needed., Disp: , Rfl:   •  olopatadine (PATANOL) 0.1 % ophthalmic solution, Administer 1 drop to both eyes 2 (Two) Times a Day As Needed., Disp: 5 mL, Rfl: 6    Medications reviewed w/ pt    Lab Results   Component Value Date    WBC 9.77 05/24/2020    WBC 9.06 01/25/2020    WBC 9.02 12/06/2019    EOSABS 0.24 05/24/2020    EOSABS 0.11 01/25/2020    EOSABS 0.15 12/06/2019    HGB 11.4 (L) 05/24/2020    HGB 10.9 (L) 01/25/2020    HGB 11.7 (L) 12/06/2019    HCT 35.9 05/24/2020    HCT 34.3  01/25/2020    HCT 36.1 12/06/2019    MCV 90.2 05/24/2020    MCV 87.3 01/25/2020    MCV 85.3 12/06/2019    MCHC 31.8 05/24/2020    MCHC 31.8 01/25/2020    MCHC 32.4 12/06/2019     05/24/2020     01/25/2020     12/06/2019     Lab Results   Component Value Date    RDW 15.9 (H) 05/24/2020    RDW 14.3 01/25/2020    RDW 14.2 12/06/2019    MCHC 31.8 05/24/2020    MCHC 31.8 01/25/2020    MCHC 32.4 12/06/2019     Lab Results   Component Value Date     05/24/2020    K 3.9 05/24/2020     05/24/2020    CO2 22.0 05/24/2020    BUN 13 05/24/2020    BUN 11 01/25/2020    BUN 15 07/27/2019    CREATININE 0.69 05/24/2020    CREATININE 0.66 01/25/2020    CREATININE 0.85 07/27/2019    EGFRIFNONA 96 05/24/2020    EGFRIFNONA 101 01/25/2020    EGFRIFNONA 76 07/27/2019    GLUCOSE 95 05/24/2020    CALCIUM 9.2 05/24/2020    ANIONGAP 12.0 05/24/2020     Lab Results   Component Value Date    AST 20 05/24/2020    AST 20 01/25/2020    AST 21 07/27/2019    ALT 14 05/24/2020    ALT 17 01/25/2020    ALT 19 07/27/2019    ALKPHOS 82 05/24/2020    ALKPHOS 92 01/25/2020    ALKPHOS 119 (H) 07/27/2019    BILITOT 0.3 05/24/2020    BILITOT 0.2 01/25/2020    BILITOT 0.2 07/27/2019    ALBUMIN 4.40 05/24/2020    ALBUMIN 4.20 01/25/2020    ALBUMIN 4.00 07/27/2019     Lab Results   Component Value Date    LIPASE 25 05/24/2020    LIPASE 27 07/27/2019    LIPASE 41 02/22/2016     Lab Results   Component Value Date    HGBA1C 5.57 04/23/2019    HGBA1C 5.50 10/18/2016    TSH 1.940 04/23/2019    TSH 1.268 10/07/2015     Lab Results   Component Value Date    INR 0.92 01/15/2017     Lab Results   Component Value Date    HPYLORIBR Negative 03/06/2019     EXAMINATION: CT ABDOMEN/PELVIS W CONTRAST - 05/24/2020       COMPARISON: 07/27/2019 abdomen and pelvis CT scan.     FINDINGS: Patient history indicates nausea, right upper quadrant pain  beginning last night. Prior history of salpingectomy.     The included lower lungs appear grossly clear.  No significant  abnormalities are seen of the liver, gallbladder, spleen, pancreas,  adrenal glands, or kidneys. There appears to be normal contrast  opacification of the mesenteric vasculature. No upper abdominal free  air, ascites, adenopathy, or acute inflammatory change is seen. Bowel  loops are normal in caliber.     Regarding the lower abdomen and pelvis, the appendix is identified with  a good degree of confidence, best seen on axial image #62 of the initial  scan, and appears normal. The terminal ileum and cecum appear normal.  Uterus appears grossly normal. Right ovary, particularly as seen on the  coronal imaging series, image 53, may contain a 2 cm water density cyst.  Left ovary is not identified. Bladder is nondistended. No intrapelvic  free fluid or inflammatory change is seen. Delayed images show contrast  opacification of normal caliber renal collecting systems, ureters, and  bladder.     IMPRESSION:  1. Approximately 2 cm right ovarian cyst.  2. No other significant intra-abdominal or intrapelvic pathology is  Identified.    Wt Readings from Last 5 Encounters:   05/27/20 99.8 kg (220 lb 0.3 oz)   05/24/20 99.8 kg (220 lb)   05/15/20 99.8 kg (220 lb 0.3 oz)   05/14/20 99.8 kg (220 lb)   01/25/20 103 kg (227 lb)   body mass index is unknown because there is no height or weight on file., , ,    Physical Exam    General Well developed; well nourished. NAD  Psych Oriented to time, place, and person.  Appropriate affect       Pt care team: Shari DESAI & GISELLE Kay  06/02/20 11:09 AM  [facility]--Gastroenterology  [phone]    CC: , Deepti ODONNELL MD   [address] / [facility] [place] [address] FAX:[phone]

## 2020-06-09 ENCOUNTER — TELEPHONE (OUTPATIENT)
Dept: ORTHOPEDIC SURGERY | Facility: CLINIC | Age: 35
End: 2020-06-09

## 2020-06-09 NOTE — TELEPHONE ENCOUNTER
Pt came in office asking for another section of her paperwork to be completed today. She has [employer] and is on a reduced schedule until re-eval on 7/8. 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 She has been taking temps and doing work as to not be walking/standing. She states she spoke with HR, and they recommended her request intermittent leave. Let pt know this would need to be reviewed with [employer] before we could complete. Pt stated understanding.    Once Estephania speaks to [employer], will contact patient.

## 2020-06-10 NOTE — TELEPHONE ENCOUNTER
SAL LOPEZ AND OK TO PROVIDE INTERMITTENT LEAVE AT THE REQUEST OF PATIENT. FORM COMPLETED AND WILL BE SENT TO [employer]. ATTEMPTED TO NOTIFY PT AT 8:45 WITH NO ANSWER. WILL TRY AGAIN LATER.

## 2020-06-17 NOTE — TELEPHONE ENCOUNTER
PT SENT EMAIL ON 6/16 - INDICATING [employer] HAS NOT RECEIVED THE UPDATE. I INFORMED HER I SENT IT 6/10, AND CONFIRMED NAME AND FAX # WHERE IT WAS SENT. SHE EMAILED BACK AND SAID THEY DIDN'T HAVE IT. REQUESTED ME EMAIL IT TO HER. EMAILED THE UPDATE TO HER ON 6/16.

## 2020-07-08 ENCOUNTER — OFFICE VISIT (OUTPATIENT)
Dept: ORTHOPEDIC SURGERY | Facility: CLINIC | Age: 35
End: 2020-07-08

## 2020-07-08 VITALS — BODY MASS INDEX: 45.68 KG/M2 | OXYGEN SATURATION: 98 % | WEIGHT: 226.6 LBS | HEIGHT: 59 IN | HEART RATE: 116 BPM

## 2020-07-08 DIAGNOSIS — M76.72 PERONEAL TENDINITIS OF LEFT LOWER EXTREMITY: Primary | ICD-10-CM

## 2020-07-08 PROCEDURE — 99212 OFFICE O/P EST SF 10 MIN: CPT | Performed by: ORTHOPAEDIC SURGERY

## 2020-07-08 NOTE — PROGRESS NOTES
"ESTABLISHED PATIENT    Patient: Andreina Brady [place]  : 1985    Primary Care Provider: Deepti Ly MD    Requesting Provider: As above    Follow-up (6 week f/u---Peroneal tendinitis of left lower extremity)      History    Chief Complaint: left peroneal tenditidis    History of Present Illness: She returns for follow-up of left peroneal tendinitis, she has been in the boot, she has been doing light duty she notes she still has occasional soreness    Current Outpatient Medications on File Prior to Visit   Medication Sig Dispense Refill   • diphenhydrAMINE (BENADRYL) 25 MG tablet Take 1 tablet by mouth Every 6 (Six) Hours As Needed for Itching. 30 tablet 0   • EPINEPHrine (EPIPEN) 0.3 MG/0.3ML solution auto-injector injection Inject into outer thigh for severe allergic reaction. Call 911 after use. May substitute only with Mylan generic(NDC [address]-37) 2 each 2   • frovatriptan (FROVA) 2.5 MG tablet Take 1 tablet by mouth at the onset of headache. If recurs, may repeat every 2 hours. Max of 3 tabs in 24 hours. 9 tablet 2   • levocetirizine (XYZAL) 5 MG tablet Take 1 tablet by mouth Daily. 30 tablet 11   • melatonin 5 MG tablet tablet Take 5 mg by mouth At Night As Needed.     • olopatadine (PATANOL) 0.1 % ophthalmic solution Administer 1 drop to both eyes 2 (Two) Times a Day As Needed. 5 mL 6   • pantoprazole (PROTONIX) 40 MG EC tablet Take 1 tablet by mouth Every Morning. Take on an EMPTY STOMACH 30 min to 1hr BEFORE the first meal of the day 30 tablet 2     No current facility-administered medications on file prior to visit.       Allergies   Allergen Reactions   • Adhesive Tape Other (See Comments)     \"Takes my skin off\"; hives   • Clindamycin/Lincomycin Other (See Comments)     Pill esophagitis   • Naproxen Swelling      Past Medical History:   Diagnosis Date   • Bronchitis    • Diabetes in pregnancy     Gestational   • Infectious mononucleosis    • Migraines    • Ovarian cyst    • Wears " "glasses      Past Surgical History:   Procedure Laterality Date   • COLONOSCOPY     • DIAGNOSTIC LAPAROSCOPY N/A 1/4/2017    Procedure: DIAGNOSTIC LAPAROSCOPY, LAP BILATERAL SALPINGECTOMY;  Surgeon: Kaia Humphrey MD;  Location:  [facility];  Service:    • ENDOSCOPY N/A 3/21/2019    Procedure: EGD;  Surgeon: Wyatt Cool MD;  Location:  [facility];  Service: Gastroenterology   • SALPINGECTOMY     • WISDOM TOOTH EXTRACTION       Family History   Problem Relation Age of Onset   • Ulcerative colitis Mother    • Hypertension Mother    • Glaucoma Father    • Osteoarthritis Father    • Cancer Other    • Diabetes Other    • Heart attack Other    • Stroke Other       Social History     Socioeconomic History   • Marital status:      Spouse name: Not on file   • Number of children: Not on file   • Years of education: Not on file   • Highest education level: Not on file   Tobacco Use   • Smoking status: Never Smoker   • Smokeless tobacco: Never Used   Substance and Sexual Activity   • Alcohol use: Yes     Comment: 2 per week   • Drug use: No   • Sexual activity: Never        Review of Systems   Constitutional: Negative.    HENT: Negative.    Eyes: Negative.    Respiratory: Negative.    Cardiovascular: Negative.    Gastrointestinal: Negative.    Endocrine: Negative.    Genitourinary: Negative.    Musculoskeletal: Positive for arthralgias.   Skin: Negative.    Allergic/Immunologic: Negative.    Neurological: Negative.    Hematological: Negative.    Psychiatric/Behavioral: Negative.        The following portions of the patient's history were reviewed and updated as appropriate: allergies, current medications, past family history, past medical history, past social history, past surgical history and problem list.    Physical Exam:   Pulse 116   Ht 149.9 cm (59.02\")   Wt 103 kg (226 lb 9.6 oz)   SpO2 98%   BMI 45.74 kg/m²   GENERAL: Body habitus: obese    Lower extremity edema: Left: none; Right: " none        MSK:  [place]:  ; Left:  non tender        Ankle:; Left:  Slightly tender over the peroneal tendons behind the fibula, slightly tender the base of the fifth metatarsal, no swelling        Medical Decision Making    Data Review:   none    Assessment/Plan/Diagnosis/Treatment Options:   1. Peroneal tendinitis of left lower extremity  She is improved but not completely resolved.  She may start PT now wean out of the boot.  She would like to try working full duty and I think that is acceptable.  I will see her again in 12 weeks when therapy is complete  - Ambulatory Referral to Physical Therapy

## 2020-07-09 ENCOUNTER — HOSPITAL ENCOUNTER (OUTPATIENT)
Dept: NUCLEAR MEDICINE | Facility: HOSPITAL | Age: 35
Discharge: HOME | End: 2020-07-09

## 2020-07-09 DIAGNOSIS — R10.13 DYSPEPSIA: ICD-10-CM

## 2020-07-09 DIAGNOSIS — R10.11 POSTPRANDIAL RUQ PAIN: ICD-10-CM

## 2020-07-09 PROCEDURE — 78227 HEPATOBIL SYST IMAGE W/DRUG: CPT

## 2020-07-09 PROCEDURE — A9537 TC99M MEBROFENIN: HCPCS | Performed by: NURSE PRACTITIONER

## 2020-07-09 PROCEDURE — 0 TECHNETIUM TC 99M MEBROFENIN KIT: Performed by: NURSE PRACTITIONER

## 2020-07-09 PROCEDURE — 25010000002 SINCALIDE PER 5 MCG: Performed by: NURSE PRACTITIONER

## 2020-07-09 RX ORDER — KIT FOR THE PREPARATION OF TECHNETIUM TC 99M MEBROFENIN 45 MG/10ML
1 INJECTION, POWDER, LYOPHILIZED, FOR SOLUTION INTRAVENOUS
Status: COMPLETED | OUTPATIENT
Start: 2020-07-09 | End: 2020-07-09

## 2020-07-09 RX ADMIN — SINCALIDE 2.1 MCG: 5 INJECTION, POWDER, LYOPHILIZED, FOR SOLUTION INTRAVENOUS at 12:50

## 2020-07-09 RX ADMIN — MEBROFENIN 1 DOSE: 45 INJECTION, POWDER, LYOPHILIZED, FOR SOLUTION INTRAVENOUS at 11:45

## 2020-07-17 ENCOUNTER — TREATMENT (OUTPATIENT)
Dept: PHYSICAL THERAPY | Facility: CLINIC | Age: 35
End: 2020-07-17

## 2020-07-17 DIAGNOSIS — R29.898 ANKLE WEAKNESS: ICD-10-CM

## 2020-07-17 DIAGNOSIS — G89.29 CHRONIC PAIN OF RIGHT ANKLE: Primary | ICD-10-CM

## 2020-07-17 DIAGNOSIS — M25.571 CHRONIC PAIN OF RIGHT ANKLE: Primary | ICD-10-CM

## 2020-07-17 PROCEDURE — 97161 PT EVAL LOW COMPLEX 20 MIN: CPT | Performed by: PHYSICAL THERAPIST

## 2020-07-17 PROCEDURE — 97110 THERAPEUTIC EXERCISES: CPT | Performed by: PHYSICAL THERAPIST

## 2020-07-17 NOTE — PROGRESS NOTES
Physical Therapy Initial Evaluation and Plan of Care      Patient: Andreina Segovia   : 1985  Diagnosis/ICD-10 Code:  Chronic pain of right ankle [M25.571, G89.29]  Referring practitioner: Silvana Reyes MD  Date of Initial Visit: 2020  Today's Date: 2020  Patient seen for 1 sessions           Subjective Questionnaire: LEFS: 46/80  Subjective Evaluation    History of Present Illness  Mechanism of injury: Pt presents w/ complaint of lateral L foot pn after starting a walking program early this year. Went for a 3 mile walk back in May and was unable to put weight through her foot afterward. Had an MRI showing peroneal tendonitis, no evidence of stress fracture. Has been in her boot for 9 weeks, told by MD to begin to wean from it as able once she initiated PT. She has pn even w/ her boot on when she walks long distances or is up on her feet for very long, though it is usually mil. She takes it off some at home but puts it on if she knows she will be up on her feet for very long. Pn located behind lateral malleolus and along lateral edge of foot.    Subjective comment: L lateral foot pain  Patient Occupation: Nurse at [employer]  Current pain ratin  At best pain ratin  At worst pain ratin  Quality: sharp and burning  Relieving factors: medications and rest  Aggravating factors: standing and ambulation  Progression: improved    Social Support  Lives with: young children    Hand dominance: right    Diagnostic Tests  MRI studies: abnormal (peroneal tendonitis)    Treatments  No previous or current treatments  Patient Goals  Patient goals for therapy: decreased pain, increased motion, increased strength, independence with ADLs/IADLs and return to sport/leisure activities           Treatment  Exercise 1  Exercise Name 1: standing calf stretch  Sets/Reps 1: 3  Time: 15 sec  Exercise 2  Exercise Name 2: seated plantarfascia stretch  Sets/Reps 2: 3  Time 2: 15 sec  Exercise 3  Exercise  Name 3: 4 way ankle w/ TB  Equipment/Resistance 3: RTB  Sets/Reps 3: 10 ea             Objective          Static Posture     Ankle/Foot   Ankle/Foot (Left): Calcaneovalgus, pes planus and pronated.   Ankle/Foot (Right): Pes planus.     Observations   Left Ankle/Foot   Negative for edema.     Palpation   Left   Tenderness of the peroneus.     Tenderness   Left Ankle/Foot   Tenderness in the fifth metatarsal base and peroneal tendon.     Active Range of Motion   Left Hip   Normal active range of motion    Right Hip   Normal active range of motion  Left Knee   Normal active range of motion    Right Knee   Normal active range of motion  Left Ankle/Foot   Dorsiflexion (ke): 15 degrees   Plantar flexion: 58 degrees   Inversion: 30 degrees   Eversion: 16 degrees     Right Ankle/Foot   Dorsiflexion (ke): 18 degrees   Plantar flexion: 50 degrees   Inversion: 45 degrees   Eversion: 12 degrees     Additional Active Range of Motion Details  B DF CKC lunge test 9.5 cm    Joint Play   Left Ankle/Foot  Joints within functional limits are the distal tibiofibular joint, talocrural joint, subtalar joint, midfoot and forefoot.     Strength/Myotome Testing     Left Hip   Planes of Motion   Flexion: 5  Extension: 5  Abduction: 5  External rotation: 5    Right Hip   Planes of Motion   Flexion: 5  Extension: 5  Abduction: 5  External rotation: 5    Left Knee   Flexion: 5  Extension: 5    Right Knee   Flexion: 5  Extension: 5    Left Ankle/Foot   Dorsiflexion: 5  Plantar flexion: 4 (pn)  Inversion: 4  Eversion: 4 (pn lateral ankle)    Right Ankle/Foot   Dorsiflexion: 5  Plantar flexion: 5  Inversion: 5  Eversion: 5    Tests   Left Ankle/Foot   Negative for windlass.     Ambulation     Observational Gait   Gait: antalgic     Functional Assessment     Single Leg Stance   Left: 12 seconds  Right: 20 seconds          Assessment & Plan     Assessment  Impairments: abnormal gait, abnormal or restricted ROM, activity intolerance, impaired  balance, impaired physical strength, lacks appropriate home exercise program, pain with function and weight-bearing intolerance  Assessment details: Pt is a 34 YOF who presents to PT w/ evolving symptoms of low complexity. Findings consistent w/ peroneal tendonitis of the L ankle. She has been immobilized in walking boot x9 weeks, cleared by MD to wean out as tolerated. She exhibits decreased eversion ROM, decreased ankle strength w/ pn upon resisted eversion and plantarflexion. Flexibility intact and SLS only mildly limited relative to contralateral LE when performed on level ground w/ eyes open. Pt exhibits increased pronation, calcaneal eversion on the L. Her pn and deficits limit her tolerance to prolonged standing and walking, restricting her ability to perform daily, recreational, work activities. She may benefit from skilled PT services to address deficits and assist w/ return to PLOF.  Barriers to therapy: n/a  Prognosis: good  Functional Limitations: walking, uncomfortable because of pain and standing  Goals  Plan Goals: STG 4 wks  1) Pt to be compliant w/ initial HEP for ROM, strength and symptom mgmt.  2) Pt to report at least a 40% improvement in symptoms.  3) Pt to demonstrate restored L ankle inversion AROM.  4) Pt to improve L ankle strength to 4+/5 or better.  5) Pt to improve LEFS score to 52/80 or better to reflect improved pn and function.  6) Pt to maintain SLS for 30 seconds or greater.    LTG 8 wks  1) Pt to be independent w/ long term HEP and self mgmt.  2) Pt to report at least a 75% improvement in symptoms.  3) Pt to improve L ankle strength to 5/5 or better.  4) Pt to improve LEFS score to 58/80 or better to reflect improved pn and function.  5) Pt to tolerate prolonged standing/walking w/ minimal to no increase in symptoms.        Plan  Therapy options: will be seen for skilled physical therapy services  Planned modality interventions: TENS, cryotherapy, ultrasound, thermotherapy  (hydrocollator packs) and iontophoresis  Planned therapy interventions: balance/weight-bearing training, flexibility, functional ROM exercises, gait training, home exercise program, joint mobilization, manual therapy, neuromuscular re-education, soft tissue mobilization, strengthening, stretching and therapeutic activities  Frequency: 2x week  Duration in visits: 15  Treatment plan discussed with: patient  Plan details: PT POC to emphasize restoration of pn free ankle strength, mobility, proprioception utilizing therex, manual therapy techniques, modalities as indicated for pn control.        Timed:  Manual Therapy:    0     mins  48181;  Therapeutic Exercise:    10     mins  55979;     Neuromuscular Blas:    0    mins  68622;    Therapeutic Activity:     0     mins  28180;     Gait Trainin     mins  84081;     Ultrasound:     0     mins  37371;    Electrical Stimulation:    0     mins  11491 ( );    Untimed:  Electrical Stimulation:    0     mins  51817 ( );  Mechanical Traction:    0     mins  26560;     Timed Treatment:   10   mins   Total Treatment:     40   mins    PT SIGNATURE: Collette Crespo, PT   DATE TREATMENT INITIATED: 2020    Initial Certification  Certification Period: 10/15/2020  I certify that the therapy services are furnished while this patient is under my care.  The services outlined above are required by this patient, and will be reviewed every 90 days.     PHYSICIAN: Silvana Reyes MD      DATE:     Please sign and return via fax to [phone].. Thank you, [facility].

## 2020-07-17 NOTE — PATIENT INSTRUCTIONS
Issued initial HEP including plantarfascia and calf stretching, 4 way ankle w/ RTB. Discussed gradual weaning from boot for short periods on level ground, progressing to longer time periods and compliant surfaces slowly as pn allows. Discussed use of ice as needed for pn control.

## 2020-07-27 ENCOUNTER — TREATMENT (OUTPATIENT)
Dept: PHYSICAL THERAPY | Facility: CLINIC | Age: 35
End: 2020-07-27

## 2020-07-27 DIAGNOSIS — G89.29 CHRONIC PAIN OF RIGHT ANKLE: Primary | ICD-10-CM

## 2020-07-27 DIAGNOSIS — R29.898 ANKLE WEAKNESS: ICD-10-CM

## 2020-07-27 DIAGNOSIS — M25.571 CHRONIC PAIN OF RIGHT ANKLE: Primary | ICD-10-CM

## 2020-07-27 PROCEDURE — 97110 THERAPEUTIC EXERCISES: CPT | Performed by: PHYSICAL THERAPIST

## 2020-07-27 NOTE — PROGRESS NOTES
Physical Therapy Daily Progress Note  Visit: 2      Patient: Andreina Segovia   : 1985  Referring practitioner: Silvana Reyes MD  Visit Diagnosis:     ICD-10-CM ICD-9-CM   1. Chronic pain of right ankle M25.571 719.47    G89.29 338.29   2. Ankle weakness R29.898 719.67     Date of Initial Visit: Type: THERAPY  Noted: 2020  Today's Date: 2020        Subjective     Andreina Segovia reports: Pt reports pn that is localized to the lateral edge of her foot distally. Was up on her feet a lot at work last few days, wearing boot. Exercises going ok, not causing much pn just mild soreness.    Treatment  Pre Treatment Pn Score: 4  Post Treatment Pn Score:  4    Exercise 1  Exercise Name 1: standing calf stretch  Sets/Reps 1: 3  Time: 15 sec  Exercise 2  Exercise Name 2: seated plantarfascia stretch  Sets/Reps 2: 3  Time 2: 15 sec  Exercise 3  Exercise Name 3: 4 way ankle w/ TB  Equipment/Resistance 3: RTB  Sets/Reps 3: 20 ea  Exercise 4  Exercise Name 4: standing arch raises  Sets/Reps 4: 20  Exercise 5  Exercise Name 5: standing heel raises  Sets/Reps 5: 15  Exercise 6  Exercise Name 6: BAPS board-CW/CCW  Equipment/Resistance 6: Lv 4  Sets/Reps 6: 10 ea             Objective       Assessment & Plan     Assessment  Assessment details: Pt compliant w/ initial HEP and is tolerating initial exercises well. She demonstrates functional ankle mobility in all planes today w/o increase in pn. She was able to initiate double limb support calf raises and standing arch raises w/o pn, reporting only fatigue. Discussed adding these to HEP.    Plan  Plan details: Increase reps w/ heel raises, add SLS activities, progress arch activation as able.               Timed:  Manual Therapy:    0     mins  54737;  Therapeutic Exercise:    26     mins  43610;     Neuromuscular Blas:    0    mins  69638;    Therapeutic Activity:     0     mins  33892;     Gait Trainin     mins  32394;     Ultrasound:      0     mins  29201;    Electrical Stimulation:    0     mins  74461 ( );    Untimed:  Electrical Stimulation:    0     mins  22978 ( );  Mechanical Traction:    0     mins  52065;     Timed Treatment:   26   mins   Total Treatment:     30   mins      Collette Crespo, PT  Physical Therapist

## 2020-07-31 ENCOUNTER — TREATMENT (OUTPATIENT)
Dept: PHYSICAL THERAPY | Facility: CLINIC | Age: 35
End: 2020-07-31

## 2020-07-31 DIAGNOSIS — G89.29 CHRONIC PAIN OF RIGHT ANKLE: Primary | ICD-10-CM

## 2020-07-31 DIAGNOSIS — R29.898 ANKLE WEAKNESS: ICD-10-CM

## 2020-07-31 DIAGNOSIS — M25.571 CHRONIC PAIN OF RIGHT ANKLE: Primary | ICD-10-CM

## 2020-07-31 PROCEDURE — 97110 THERAPEUTIC EXERCISES: CPT | Performed by: PHYSICAL THERAPIST

## 2020-07-31 PROCEDURE — 97112 NEUROMUSCULAR REEDUCATION: CPT | Performed by: PHYSICAL THERAPIST

## 2020-07-31 NOTE — PROGRESS NOTES
Physical Therapy Daily Progress Note  Visit: 3      Patient: Andreina Segovia   : 1985  Referring practitioner: Silvana Reyes MD  Visit Diagnosis:     ICD-10-CM ICD-9-CM   1. Chronic pain of right ankle M25.571 719.47    G89.29 338.29   2. Ankle weakness R29.898 719.67     Date of Initial Visit: Type: THERAPY  Noted: 2020  Today's Date: 2020        Subjective     Andreina Segovia reports: Pt states that her foot stays sore w/ work. Had some soreness after her last visit but subsided some w/ ice. Has been having pn on the side of her pinky toe.     Treatment  Pre Treatment Pn Score: 2  Post Treatment Pn Score:  2    Exercise 1  Exercise Name 1: BAPS Board  Equipment/Resistance 1: Lv 4  Sets/Reps 1: 15 ea  Exercise 2  Exercise Name 2: incline calf stretch  Sets/Reps 2: 3  Time 2: 15 sec  Exercise 3  Exercise Name 3: 4 way ankle w/ TB  Equipment/Resistance 3: RTB  Sets/Reps 3: 30  Exercise 4  Exercise Name 4: standing heel raises  Sets/Reps 4: 30  Exercise 5  Exercise Name 5: standing arch raises- double limb support  Sets/Reps 5: 20  Exercise 6  Exercise Name 6: arch raises w/ SLS  Sets/Reps 6: 30  Exercise 7  Exercise Name 7: SLS on airex  Sets/Reps 7: 3  Time 7: 15 sec             Objective      Minimal discomfort w/ resisted eversion  Lateral 5th MTP jt TTP, erythematic, mildly swollen     Assessment & Plan     Assessment  Assessment details: Pt tolerating exercise in clinic very well. She is gradually able to progress reps w/ TB strengthening and standing heel raises w/o complaint of increased pn. Progressed today to SLS while maintaining arch raise. Pt's lateral 5th MTP joint TTP, erythmatic, mildly swollen, likely due to pressure from walking boot. Discussed adding layer or two of foam padding to decrease pressure or spending less time in boot.     Plan  Plan details: May incorporate isometric holds w/ standing heel raises, try green TB w/ 4 way ankle                Timed:  Manual Therapy:    0     mins  96176;  Therapeutic Exercise:    20     mins  61870;     Neuromuscular Blas:    10    mins  43821;    Therapeutic Activity:     0     mins  24227;     Gait Trainin     mins  27189;     Ultrasound:     0     mins  17036;    Electrical Stimulation:    0     mins  33778 ( );    Untimed:  Electrical Stimulation:    0     mins  61512 ( );  Mechanical Traction:    0     mins  81215;     Timed Treatment:   30   mins   Total Treatment:     32   mins      Collette Crespo, PT  Physical Therapist

## 2020-08-05 ENCOUNTER — TREATMENT (OUTPATIENT)
Dept: PHYSICAL THERAPY | Facility: CLINIC | Age: 35
End: 2020-08-05

## 2020-08-05 DIAGNOSIS — G89.29 CHRONIC PAIN OF RIGHT ANKLE: Primary | ICD-10-CM

## 2020-08-05 DIAGNOSIS — M25.571 CHRONIC PAIN OF RIGHT ANKLE: Primary | ICD-10-CM

## 2020-08-05 DIAGNOSIS — R29.898 ANKLE WEAKNESS: ICD-10-CM

## 2020-08-05 PROCEDURE — 97110 THERAPEUTIC EXERCISES: CPT | Performed by: PHYSICAL THERAPIST

## 2020-08-05 NOTE — PROGRESS NOTES
Physical Therapy Daily Progress Note  Visit: 4      Patient: Andreina Segovia   : 1985  Referring practitioner: Silvana Reyes MD  Visit Diagnosis:     ICD-10-CM ICD-9-CM   1. Chronic pain of right ankle M25.571 719.47    G89.29 338.29   2. Ankle weakness R29.898 719.67     Date of Initial Visit: Type: THERAPY  Noted: 2020  Today's Date: 2020        Subjective     Andreina Segovia reports: Pt states that pn has been minimal. She removed her boot for a day when taking her daughter to a MD appt in [place], did not have any issue. Placed extra foam padding in her boot to try and cushion her pinky toe, which did help some. That pn subsides when she is out of her boot. Red band becoming easy w/ ankle 4 way.    Treatment  Pre Treatment Pn Score: 0  Post Treatment Pn Score:  0    Exercise 1  Exercise Name 1: BAPS Board  Equipment/Resistance 1: Lv 4  Sets/Reps 1: 20 ea  Exercise 2  Exercise Name 2: inversion/eversion  Equipment/Resistance 2: GTB  Sets/Reps 2: 20 ea  Exercise 3  Exercise Name 3: isometric heel raises  Sets/Reps 3: 15  Time 3: 5 sec hold  Exercise 4  Exercise Name 4: off step heel raises  Sets/Reps 4: 30  Exercise 5  Exercise Name 5: SLS airex  Sets/Reps 5: 3  Time 5: 15 sec  Exercise 6  Exercise Name 6: single leg arch raises  Sets/Reps 6: 30  Exercise 7  Exercise Name 7: incline calf stretch  Sets/Reps 7: 3  Time 7: 15 sec            Objective       Assessment & Plan     Assessment  Assessment details: Pt doing well w/ weaning out of boot. Tenderness along 5th MTP improved when out of boot. Pt able to progress to GTB w/ ankle strengthening, as well as isometric heel raises and off step heel raises w/ report of only fatigue, no pn. Issued GTB for HEP progression and recommended she try isometric holds w/ heel raises as well for tendon loading.    Plan  Plan details: Progress to eccentrics as tolerated. Add SLS w/ contralateral leg swings with arch raises.                Timed:  Manual Therapy:    0     mins  22991;  Therapeutic Exercise:    28     mins  35671;     Neuromuscular Blas:    0    mins  36195;    Therapeutic Activity:     0     mins  28957;     Gait Trainin     mins  69670;     Ultrasound:     0     mins  71982;    Electrical Stimulation:    0     mins  48142 ( );    Untimed:  Electrical Stimulation:    0     mins  00463 ( );  Mechanical Traction:    0     mins  57395;     Timed Treatment:   28   mins   Total Treatment:     30   mins      Collette Crespo, PT  Physical Therapist

## 2020-08-10 ENCOUNTER — TREATMENT (OUTPATIENT)
Dept: PHYSICAL THERAPY | Facility: CLINIC | Age: 35
End: 2020-08-10

## 2020-08-10 DIAGNOSIS — M25.571 CHRONIC PAIN OF RIGHT ANKLE: Primary | ICD-10-CM

## 2020-08-10 DIAGNOSIS — G89.29 CHRONIC PAIN OF RIGHT ANKLE: Primary | ICD-10-CM

## 2020-08-10 DIAGNOSIS — R29.898 ANKLE WEAKNESS: ICD-10-CM

## 2020-08-10 PROCEDURE — 97110 THERAPEUTIC EXERCISES: CPT | Performed by: PHYSICAL THERAPIST

## 2020-08-10 NOTE — PROGRESS NOTES
Physical Therapy Daily Progress Note  Visit: 5      Patient: Andreina Segovia   : 1985  Referring practitioner: Silvana Reyes MD  Visit Diagnosis:     ICD-10-CM ICD-9-CM   1. Chronic pain of right ankle M25.571 719.47    G89.29 338.29   2. Ankle weakness R29.898 719.67     Date of Initial Visit: Type: THERAPY  Noted: 2020  Today's Date: 8/10/2020        Subjective     Andreina Segovia reports: Pt states she has gone several days w/o wearing her boot. Noticed slight discomfort along her medial calf but was brief, otherwise having little to no pn. Sees her orthotist tomorrow to be fitted for new orthotics.    Treatment  Pre Treatment Pn Score: 0  Post Treatment Pn Score:  0    Exercise 1  Exercise Name 1: inversion/eversion  Equipment/Resistance 1: GTB  Sets/Reps 1: 30 ea  Exercise 2  Exercise Name 2: isometrics heel raises-unilateral  Sets/Reps 2: 20  Time 2: 5 sec hold  Exercise 3  Exercise Name 3: off step heel raises  Sets/Reps 3: 30  Exercise 4  Exercise Name 4: SLS airex  Sets/Reps 4: 3  Time 4: 20 sec  Exercise 5  Exercise Name 5: single leg arch raises w/ leg swings  Sets/Reps 5: 30  Exercise 6  Exercise Name 6: soleus heel raises  Sets/Reps 6: 30            Objective          Strength/Myotome Testing     Left Ankle/Foot   Dorsiflexion: 5  Inversion: 5  Eversion: 5    Additional Strength Details  No pn w/ MMT          Assessment & Plan     Assessment  Assessment details: Pt out of boot completely for 3 days and denies any significant increase in pn. She is doing well w/ isometric loading and proprioceptive activities. She exhibits full eversion strength w/ no pn reproduction upon testing. Progressed today to arch raises w/ perturbation, soleus heel raises, and increased reps where tolerated. Pt reported slight discomfort during isometric loading when weight fully shifted to LLE, but subsided quickly w/ rest. No pn by end of visit.    Plan  Plan details: Reassess next visit,  update HEP, add eccentric heel raises on level ground               Timed:  Manual Therapy:    0     mins  62418;  Therapeutic Exercise:    25     mins  45671;     Neuromuscular Blas:    0    mins  36825;    Therapeutic Activity:     0     mins  01920;     Gait Trainin     mins  94392;     Ultrasound:     0     mins  48564;    Electrical Stimulation:    0     mins  91331 ( );    Untimed:  Electrical Stimulation:    0     mins  05800 ( );  Mechanical Traction:    0     mins  92760;     Timed Treatment:   25   mins   Total Treatment:     27   mins      Collette Crespo, PT  Physical Therapist

## 2020-08-11 ENCOUNTER — OFFICE VISIT (OUTPATIENT)
Dept: GASTROENTEROLOGY | Facility: CLINIC | Age: 35
End: 2020-08-11

## 2020-08-11 ENCOUNTER — LAB (OUTPATIENT)
Dept: LAB | Facility: HOSPITAL | Age: 35
End: 2020-08-11

## 2020-08-11 VITALS
BODY MASS INDEX: 46.69 KG/M2 | HEART RATE: 109 BPM | HEIGHT: 59 IN | TEMPERATURE: 97.3 F | OXYGEN SATURATION: 99 % | DIASTOLIC BLOOD PRESSURE: 78 MMHG | WEIGHT: 231.6 LBS | SYSTOLIC BLOOD PRESSURE: 130 MMHG

## 2020-08-11 DIAGNOSIS — R10.13 DYSPEPSIA: ICD-10-CM

## 2020-08-11 DIAGNOSIS — R10.11 RIGHT UPPER QUADRANT ABDOMINAL PAIN: Primary | ICD-10-CM

## 2020-08-11 DIAGNOSIS — R11.0 NAUSEA: ICD-10-CM

## 2020-08-11 DIAGNOSIS — R10.11 RIGHT UPPER QUADRANT ABDOMINAL PAIN: ICD-10-CM

## 2020-08-11 PROCEDURE — 83516 IMMUNOASSAY NONANTIBODY: CPT

## 2020-08-11 PROCEDURE — 36415 COLL VENOUS BLD VENIPUNCTURE: CPT

## 2020-08-11 PROCEDURE — 99214 OFFICE O/P EST MOD 30 MIN: CPT | Performed by: INTERNAL MEDICINE

## 2020-08-11 PROCEDURE — 82784 ASSAY IGA/IGD/IGG/IGM EACH: CPT

## 2020-08-11 RX ORDER — CHOLECALCIFEROL (VITAMIN D3) 50 MCG
2000 TABLET ORAL DAILY
COMMUNITY

## 2020-08-11 NOTE — PROGRESS NOTES
PCP: Deepti Ly MD    Chief Complaint   Patient presents with   • Follow-up     abdominal pain       History of Present Illness:   HPI  Ms. Segovia presents to the office today.  She is still experiencing some sharp pain in the right upper aspect of the abdomen.  The pain comes and goes but is usually present after a meal.  She will experience the abdominal pain a couple times a week.  Ms. Segovia admits to nausea maybe 1 time a week.  She had a HIDA scan that was normal but there was some reproduction of nausea with the study.  Ms. Segovia usually works from 7 PM to 7 AM thus her sleep cycle is altered.  There is no history of difficult or painful swallowing.  She denies any vincent heartburn at this time.  Ms. Segovia did not really notice any difference with Protonix or Pepcid with regard to the pain.  Past Medical History:   Diagnosis Date   • Bronchitis    • Diabetes in pregnancy     Gestational   • Infectious mononucleosis    • Migraines    • Ovarian cyst    • Wears glasses        Past Surgical History:   Procedure Laterality Date   • COLONOSCOPY     • DIAGNOSTIC LAPAROSCOPY N/A 1/4/2017    Procedure: DIAGNOSTIC LAPAROSCOPY, LAP BILATERAL SALPINGECTOMY;  Surgeon: Kaia Humphrey MD;  Location: [facility] OR;  Service:    • ENDOSCOPY N/A 3/21/2019    Procedure: EGD;  Surgeon: Wyatt Cool MD;  Location: [facility] ENDOSCOPY;  Service: Gastroenterology   • SALPINGECTOMY     • WISDOM TOOTH EXTRACTION           Current Outpatient Medications:   •  Cholecalciferol (VITAMIN D) 50 MCG (2000 UT) tablet, Take 2,000 Units by mouth Daily., Disp: , Rfl:   •  diphenhydrAMINE (BENADRYL) 25 MG tablet, Take 1 tablet by mouth Every 6 (Six) Hours As Needed for Itching., Disp: 30 tablet, Rfl: 0  •  EPINEPHrine (EPIPEN) 0.3 MG/0.3ML solution auto-injector injection, Inject into outer thigh for severe allergic reaction. Call 911 after use. May substitute only with Mylan generic(NDC [address]-33), Disp: 2 each,  "Rfl: 2  •  frovatriptan (FROVA) 2.5 MG tablet, Take 1 tablet by mouth at the onset of headache. If recurs, may repeat every 2 hours. Max of 3 tabs in 24 hours., Disp: 9 tablet, Rfl: 2  •  levocetirizine (XYZAL) 5 MG tablet, Take 1 tablet by mouth Daily., Disp: 30 tablet, Rfl: 11  •  melatonin 5 MG tablet tablet, Take 5 mg by mouth At Night As Needed., Disp: , Rfl:   •  olopatadine (PATANOL) 0.1 % ophthalmic solution, Administer 1 drop to both eyes 2 (Two) Times a Day As Needed., Disp: 5 mL, Rfl: 6  •  pantoprazole (PROTONIX) 40 MG EC tablet, Take 1 tablet by mouth Every Morning. Take on an EMPTY STOMACH 30 min to 1hr BEFORE the first meal of the day, Disp: 30 tablet, Rfl: 2    Allergies   Allergen Reactions   • Adhesive Tape Other (See Comments)     \"Takes my skin off\"; hives   • Clindamycin/Lincomycin Other (See Comments)     Pill esophagitis   • Naproxen Swelling       Family History   Problem Relation Age of Onset   • Ulcerative colitis Mother    • Hypertension Mother    • Glaucoma Father    • Osteoarthritis Father    • Cancer Other    • Diabetes Other    • Heart attack Other    • Stroke Other    Brother- celiac sprue    Social History     Socioeconomic History   • Marital status:      Spouse name: Not on file   • Number of children: Not on file   • Years of education: Not on file   • Highest education level: Not on file   Tobacco Use   • Smoking status: Never Smoker   • Smokeless tobacco: Never Used   Substance and Sexual Activity   • Alcohol use: Yes     Comment: 2 per week   • Drug use: No   • Sexual activity: Never       Review of Systems   Constitutional: Negative for activity change, appetite change, fatigue, fever and unexpected weight change.   HENT: Negative for dental problem, hearing loss, mouth sores, postnasal drip, sneezing, trouble swallowing and voice change.    Eyes: Negative for pain, redness, itching and visual disturbance.   Respiratory: Negative for cough, choking, chest tightness, " shortness of breath and wheezing.    Cardiovascular: Negative for chest pain, palpitations and leg swelling.   Gastrointestinal: Positive for abdominal pain and nausea. Negative for abdominal distention, anal bleeding, blood in stool, constipation, diarrhea, rectal pain and vomiting.   Endocrine: Negative for cold intolerance, heat intolerance, polydipsia, polyphagia and polyuria.   Genitourinary: Negative.  Negative for dysuria, enuresis, flank pain, hematuria and urgency.   Musculoskeletal: Negative for arthralgias, back pain, gait problem, joint swelling and myalgias.   Skin: Negative for color change, pallor and rash.   Allergic/Immunologic: Negative for environmental allergies, food allergies and immunocompromised state.   Neurological: Negative for dizziness, tremors, seizures, facial asymmetry, speech difficulty, numbness and headaches.   Hematological: Negative for adenopathy.   Psychiatric/Behavioral: Negative for behavioral problems, confusion, dysphoric mood, hallucinations and self-injury.       Vitals:    08/11/20 1424   BP: 130/78   Pulse: 109   Temp: 97.3 °F (36.3 °C)   SpO2: 99%       Physical Exam   Constitutional: She is oriented to person, place, and time. No distress.   Morbid obesity   HENT:   Head: Atraumatic.   Mouth/Throat: Oropharynx is clear and moist.   Eyes: EOM are normal. No scleral icterus.   Neck: Neck supple.   Cardiovascular: Normal rate, regular rhythm and normal heart sounds. Exam reveals no gallop.   No murmur heard.  Pulmonary/Chest: Effort normal and breath sounds normal. She has no wheezes. She has no rales.   Abdominal: Soft. Bowel sounds are normal. There is tenderness (right upper quadrant). There is no guarding.   Musculoskeletal: Normal range of motion. She exhibits no edema.   Lymphadenopathy:     She has no cervical adenopathy.   Neurological: She is alert and oriented to person, place, and time. She exhibits normal muscle tone.   Skin: Skin is dry. No erythema.    Psychiatric: She has a normal mood and affect. Her behavior is normal. Thought content normal.   Vitals reviewed.      Andreina was seen today for follow-up.    Diagnoses and all orders for this visit:    Right upper quadrant abdominal pain  -     Celiac Ab tTG DGP TIgA; Future  -     Ambulatory Referral to General Surgery    Dyspepsia  -     Celiac Ab tTG DGP TIgA; Future  -     Ambulatory Referral to General Surgery    Nausea  -     Celiac Ab tTG DGP TIgA; Future  -     Ambulatory Referral to General Surgery    The patient has pain in the right upper quadrant that is related to meals.  She states there is a family history of celiac sprue.  There was some reproduction of the nausea during the HIDA scan.  There are aspects of the history that suggest underlying gallbladder disease.  The patient states the symptoms have been present now for about 3 months.      Plan: Will check celiac panel.           Will obtain general surgical consultation with Dr. Esparza.

## 2020-08-13 ENCOUNTER — TREATMENT (OUTPATIENT)
Dept: PHYSICAL THERAPY | Facility: CLINIC | Age: 35
End: 2020-08-13

## 2020-08-13 DIAGNOSIS — R29.898 ANKLE WEAKNESS: ICD-10-CM

## 2020-08-13 DIAGNOSIS — G89.29 CHRONIC PAIN OF RIGHT ANKLE: Primary | ICD-10-CM

## 2020-08-13 DIAGNOSIS — M25.571 CHRONIC PAIN OF RIGHT ANKLE: Primary | ICD-10-CM

## 2020-08-13 LAB
GLIADIN PEPTIDE IGA SER-ACNC: 5 UNITS (ref 0–19)
GLIADIN PEPTIDE IGG SER-ACNC: 2 UNITS (ref 0–19)
IGA SERPL-MCNC: 285 MG/DL (ref 87–352)
TTG IGA SER-ACNC: <2 U/ML (ref 0–3)
TTG IGG SER-ACNC: 3 U/ML (ref 0–5)

## 2020-08-13 PROCEDURE — 97110 THERAPEUTIC EXERCISES: CPT | Performed by: PHYSICAL THERAPIST

## 2020-08-13 NOTE — PATIENT INSTRUCTIONS
Issued updated HEP including TB eversion, eccentric heel raises, double limb support heel raises w/ isometric holds, arch lifts.

## 2020-08-13 NOTE — PROGRESS NOTES
Re-Assessment / Re-Certification      Patient: Andreina Segovia   : 1985  Diagnosis/ICD-10 Code:  Chronic pain of right ankle [M25.571, G89.29]  Referring practitioner: Silvana Reyes MD  Date of Initial Visit: Type: THERAPY  Noted: 2020  Today's Date: 2020  Patient seen for 6 sessions      Subjective:   Subjective Questionnaire: LEFS: 57/80  Clinical Progress: improved  Home Program Compliance: Yes  Treatment has included: therapeutic exercise, neuromuscular re-education and manual therapy    Subjective    Andreina Segovia reports: Pt reports no pn at time of visit this morning. Ha some soreness occasionally after working 12 hour shifts because she is up on her feet for long periods of time. Otherwise she is weaned from the boot w/o issues. Reports compliance w/ HEP.    Pre tx pn score: 0  Post tx pn score: 1      Treatment  Exercise 1  Exercise Name 1: Reassessment  Exercise 2  Exercise Name 2: eccentric heel raises  Sets/Reps 2: 10  Exercise 3  Exercise Name 3: off step heel raises  Sets/Reps 3: 30  Exercise 4  Exercise Name 4: SLS airex  Sets/Reps 4: 3  Time 4: 20 sec  Exercise 5  Exercise Name 5: single leg arch raises w/ leg swings  Sets/Reps 5: 30  Exercise 6  Exercise Name 6: single leg RDL w/ arch raise, cone tap  Sets/Reps 6: 15  Exercise 7  Exercise Name 7: soleus heel raises  Sets/Reps 7: 30                   Objective          Static Posture     Ankle/Foot   Ankle/Foot (Left): Calcaneovalgus, pes planus and pronated.   Ankle/Foot (Right): Pes planus.     Observations   Left Ankle/Foot   Negative for edema.       Palpation   Left   Tenderness of the peroneus.     Tenderness   Left Ankle/Foot   Tenderness in the fifth metatarsal base and peroneal tendon.     Active Range of Motion   Left Hip   Normal active range of motion    Right Hip   Normal active range of motion  Left Knee   Normal active range of motion    Right Knee   Normal active range of motion  Left Ankle/Foot    Dorsiflexion (ke): 15 degrees   Plantar flexion: 58 degrees   Inversion: 42 degrees   Eversion: 16 degrees     Right Ankle/Foot   Dorsiflexion (ke): 18 degrees   Plantar flexion: 50 degrees   Inversion: 45 degrees   Eversion: 12 degrees     Additional Active Range of Motion Details  B DF CKC lunge test 9.5 cm    Joint Play   Left Ankle/Foot  Joints within functional limits are the distal tibiofibular joint, talocrural joint, subtalar joint, midfoot and forefoot.     Strength/Myotome Testing     Left Hip   Planes of Motion   Flexion: 5  Extension: 5  Abduction: 5  External rotation: 5    Right Hip   Planes of Motion   Flexion: 5  Extension: 5  Abduction: 5  External rotation: 5    Left Knee   Flexion: 5  Extension: 5    Right Knee   Flexion: 5  Extension: 5    Left Ankle/Foot   Dorsiflexion: 5  Plantar flexion: 4  Inversion: 5  Eversion: 5    Right Ankle/Foot   Dorsiflexion: 5  Plantar flexion: 5  Inversion: 5  Eversion: 5    Tests   Left Ankle/Foot   Negative for windlass.     Functional Assessment     Single Leg Stance   Left: 30 seconds  Right: 30 seconds          Assessment & Plan     Assessment  Impairments: abnormal or restricted ROM, activity intolerance, impaired balance, impaired physical strength, lacks appropriate home exercise program and pain with function  Assessment details: Reassessment performed today. Pt has completed 6 visits. She exhibits significant improvements in pn, function, ankle strength and mobility. She has weaned out of her walking boot. She reports only occasional soreness along the lateral lower leg/ankle after 12 hour work shifts. Ankle mobility and strength restored in all planes w/ exception of PF strength, which is improved but remains limited. Pt is progressing very well toward her goals and would benefit from continued PT services.  Barriers to therapy: n/a  Prognosis: good  Functional Limitations: walking, uncomfortable because of pain and standing  Goals  Plan Goals: STG 4  wks  1) Pt to be compliant w/ initial HEP for ROM, strength and symptom mgmt.-MET  2) Pt to report at least a 40% improvement in symptoms.-MET  3) Pt to demonstrate restored L ankle inversion AROM.-MET  4) Pt to improve L ankle strength to 4+/5 or better.-PARTIALLY MET  5) Pt to improve LEFS score to 52/80 or better to reflect improved pn and function.-MET  6) Pt to maintain SLS for 30 seconds or greater.-MET    LTG 8 wks  1) Pt to be independent w/ long term HEP and self mgmt.-PROGRESSING  2) Pt to report at least a 75% improvement in symptoms.-PROGRESSING  3) Pt to improve L ankle strength to 5/5 or better.-PARTIALLY MET  4) Pt to improve LEFS score to 58/80 or better to reflect improved pn and function.-PROGRESSING  5) Pt to tolerate prolonged standing/walking w/ minimal to no increase in symptoms.-PROGRESSING        Plan  Therapy options: will be seen for skilled physical therapy services  Planned modality interventions: TENS, cryotherapy, ultrasound, thermotherapy (hydrocollator packs) and iontophoresis  Planned therapy interventions: balance/weight-bearing training, flexibility, functional ROM exercises, gait training, home exercise program, joint mobilization, manual therapy, neuromuscular re-education, soft tissue mobilization, strengthening, stretching and therapeutic activities  Frequency: 1x week  Duration in visits: 8  Treatment plan discussed with: patient  Plan details: Cont w/ current PT POC, emphasizing restoration of PF strength utilizing eccentrics and isometric loading as tolerated, as well as balance/proprioceptive training.      Progress toward previous goals: Partially Met        PT Signature: Collette Crespo, PT        Timed:  Manual Therapy:    0     mins  88773;  Therapeutic Exercise:    28     mins  89951;     Neuromuscular Blas:    0    mins  08399;    Therapeutic Activity:     0     mins  74351;     Gait Trainin     mins  19821;     Ultrasound:     0     mins  50363;     Electrical Stimulation:    0     mins  06450 ( );    Untimed:  Electrical Stimulation:    0     mins  39161 ( );  Mechanical Traction:    0     mins  83609;     Timed Treatment:   28   mins   Total Treatment:     30   mins

## 2020-08-14 ENCOUNTER — TELEPHONE (OUTPATIENT)
Dept: GASTROENTEROLOGY | Facility: CLINIC | Age: 35
End: 2020-08-14

## 2020-08-14 NOTE — TELEPHONE ENCOUNTER
----- Message from Wyatt Cool MD sent at 8/13/2020  3:52 PM EDT -----  Let Andreina know the celiac panel was normal.  Thank you,  CASEY

## 2020-08-18 ENCOUNTER — TREATMENT (OUTPATIENT)
Dept: PHYSICAL THERAPY | Facility: CLINIC | Age: 35
End: 2020-08-18

## 2020-08-18 DIAGNOSIS — M25.571 CHRONIC PAIN OF RIGHT ANKLE: Primary | ICD-10-CM

## 2020-08-18 DIAGNOSIS — G89.29 CHRONIC PAIN OF RIGHT ANKLE: Primary | ICD-10-CM

## 2020-08-18 DIAGNOSIS — R29.898 ANKLE WEAKNESS: ICD-10-CM

## 2020-08-18 PROCEDURE — 97110 THERAPEUTIC EXERCISES: CPT | Performed by: PHYSICAL THERAPIST

## 2020-08-18 NOTE — PROGRESS NOTES
Physical Therapy Daily Progress Note  Visit: 7      Patient: Andreina Segovia   : 1985  Referring practitioner: Silvana Reyes MD  Visit Diagnosis:     ICD-10-CM ICD-9-CM   1. Chronic pain of right ankle M25.571 719.47    G89.29 338.29   2. Ankle weakness R29.898 719.67     Date of Initial Visit: Type: THERAPY  Noted: 2020  Today's Date: 2020        Subjective     Andreina Segovia reports: Pt states that she worked Friday, Saturday,  and was on her feet nearly the whole time due to being understaffed. Has been a little sore but denies significant pn.    Treatment  Pre Treatment Pn Score: 0  Post Treatment Pn Score:  0    Exercise 1  Exercise Name 1: BAPS board ROM  Sets/Reps 1: Lv 4  Time: 40  Exercise 2  Exercise Name 2: eversion  Equipment/Resistance 2: GTB  Sets/Reps 2: 30  Exercise 3  Exercise Name 3: off step heel raises  Sets/Reps 3: 30  Exercise 4  Exercise Name 4: eccentric heel raises  Sets/Reps 4: 30  Exercise 5  Exercise Name 5: SLS airex  Sets/Reps 5: 3  Time 5: 30 sec  Exercise 6  Exercise Name 6: single leg RDL w/ arch raise, cone tap  Sets/Reps 6: 15  Exercise 7  Exercise Name 7: 1/2 FR SLS  Sets/Reps 7: 2  Time 7: 15 sec  Exercise 8  Exercise Name 8: 1/2 FR SLS w/ active inversion/eversion  Sets/Reps 8: 20 ea            Objective       Assessment & Plan     Assessment  Assessment details: Pt continues to report mild soreness lateral aspect of midfoot after working consecutive 12 hour shifts, but denies any return of sharp or radiating pn. She tolerated all exercises well today, including progression to SLS on  foam roller to challenge subtalar stability, balance, and proprioception. She performs heel raises throughout full ROM unless performed unilaterally, but is able to lower eccentrically from fully plantarflexed position w/ good control and minimal pn.     Plan  Plan details: Cont w/ current POC-may add toe walking               Timed:  Manual  Therapy:    0     mins  20516;  Therapeutic Exercise:    30     mins  86104;     Neuromuscular Blas:    0    mins  08598;    Therapeutic Activity:     0     mins  21343;     Gait Trainin     mins  40198;     Ultrasound:     0     mins  58260;    Electrical Stimulation:    0     mins  08937 ( );    Untimed:  Electrical Stimulation:    0     mins  78004 ( );  Mechanical Traction:    0     mins  75080;     Timed Treatment:   30   mins   Total Treatment:     32   mins      Collette Crespo, PT  Physical Therapist

## 2020-08-21 ENCOUNTER — TREATMENT (OUTPATIENT)
Dept: PHYSICAL THERAPY | Facility: CLINIC | Age: 35
End: 2020-08-21

## 2020-08-21 DIAGNOSIS — M25.571 CHRONIC PAIN OF RIGHT ANKLE: Primary | ICD-10-CM

## 2020-08-21 DIAGNOSIS — R29.898 ANKLE WEAKNESS: ICD-10-CM

## 2020-08-21 DIAGNOSIS — G89.29 CHRONIC PAIN OF RIGHT ANKLE: Primary | ICD-10-CM

## 2020-08-21 PROCEDURE — 97110 THERAPEUTIC EXERCISES: CPT | Performed by: PHYSICAL THERAPIST

## 2020-08-21 NOTE — PROGRESS NOTES
Physical Therapy Daily Progress Note  Visit: 8      Patient: Andreina Segovia   : 1985  Referring practitioner: Silvana Reyes MD  Visit Diagnosis:     ICD-10-CM ICD-9-CM   1. Chronic pain of right ankle M25.571 719.47    G89.29 338.29   2. Ankle weakness R29.898 719.67     Date of Initial Visit: Type: THERAPY  Noted: 2020  Today's Date: 2020        Subjective     Andreina Segovia reports: Pt states that she had a lot of pn yesterday after attempting to complete work shift w/o boot. States that she did wear her orthotics that were recently issued. Pn located behind the lateral malleolus into lateral foot. Rates pn yesterday 6/10, improved w/ Tylenol.    Treatment  Pre Treatment Pn Score: 3  Post Treatment Pn Score:  3    Exercise 1  Exercise Name 1: BAPS board ROM  Sets/Reps 1: Lv 4  Time: 40  Exercise 2  Exercise Name 2: eversion  Equipment/Resistance 2: GTB  Sets/Reps 2: 30  Exercise 3  Exercise Name 3: off step heel raises-knees extended/knees flexed  Sets/Reps 3: 30 ea  Exercise 4  Exercise Name 4: eccentric heel raises  Sets/Reps 4: 30  Exercise 5  Exercise Name 5: 1/2 FR SLS  Sets/Reps 5: 3  Time 5: 30 sec  Exercise 6  Exercise Name 6: single leg RDL w/ arch raise, cone tap  Sets/Reps 6: 15  Exercise 7  Exercise Name 7: SLS w/ arch raise and contralateral LE cone taps  Sets/Reps 7: 30  Exercise 8  Exercise Name 8: toe walking // bars  Sets/Reps 8: 3 laps            Objective       Assessment & Plan     Assessment  Assessment details: Pt c/o pn exacerbation after working full shift w/o boot, wearing tennis shoes and recently issued orthotics. Pn improved w/ medication but she c/o continued soreness today. Discussed wearing boot for half of shift and gradually increasing time in tennis shoes/orthotic as pn allows. Pt tolerates eccentric and isometric calf loading well. She reports no increase in pn w/ eccentric eversion using TB. Continued today w/ focus on peroneal, calf  strengthening and arch raise progression.    Plan  Plan details: Cont w/ POC               Timed:  Manual Therapy:    0     mins  56911;  Therapeutic Exercise:    30     mins  30691;     Neuromuscular Blas:    0    mins  59781;    Therapeutic Activity:     0     mins  92838;     Gait Trainin     mins  50762;     Ultrasound:     0     mins  55172;    Electrical Stimulation:    0     mins  64954 ( );    Untimed:  Electrical Stimulation:    0     mins  33325 ( );  Mechanical Traction:    0     mins  05469;     Timed Treatment:   30   mins   Total Treatment:     32   mins      Collette Crespo, PT  Physical Therapist

## 2020-08-25 ENCOUNTER — TREATMENT (OUTPATIENT)
Dept: PHYSICAL THERAPY | Facility: CLINIC | Age: 35
End: 2020-08-25

## 2020-08-25 DIAGNOSIS — G89.29 CHRONIC PAIN OF RIGHT ANKLE: Primary | ICD-10-CM

## 2020-08-25 DIAGNOSIS — R29.898 ANKLE WEAKNESS: ICD-10-CM

## 2020-08-25 DIAGNOSIS — M25.571 CHRONIC PAIN OF RIGHT ANKLE: Primary | ICD-10-CM

## 2020-08-25 PROCEDURE — 97110 THERAPEUTIC EXERCISES: CPT | Performed by: PHYSICAL THERAPIST

## 2020-08-25 NOTE — PROGRESS NOTES
Physical Therapy Daily Progress Note  Visit: 9      Patient: Andreina Segovia   : 1985  Referring practitioner: Silvana Reyes MD  Visit Diagnosis:     ICD-10-CM ICD-9-CM   1. Chronic pain of right ankle M25.571 719.47    G89.29 338.29   2. Ankle weakness R29.898 719.67     Date of Initial Visit: Type: THERAPY  Noted: 2020  Today's Date: 2020        Subjective     Andreina Segovia reports: Pt states that her pn is minimal right now. Has not had much over the past couple of days. No soreness since her previous visit. Past few work days wore her boot for no more than 50% of her shift. Had to run down the hallway once when responding to a code called, had some pn afterward but managed well w/ Tylenol.    Treatment  Pre Treatment Pn Score: 1  Post Treatment Pn Score:  0    Exercise 1  Exercise Name 1: eccentric heel raises  Sets/Reps 1: 30  Exercise 2  Exercise Name 2: off step heel raises-knees extended/knees flexed  Sets/Reps 2: 30 ea  Exercise 3  Exercise Name 3: 1/2 FR SLS  Sets/Reps 3: 3  Time 3: 30 sec  Exercise 4  Exercise Name 4: BOSU step ups  Sets/Reps 4: 20  Exercise 5  Exercise Name 5: single leg RDL w/ arch raise, cone tap  Sets/Reps 5: 15  Exercise 6  Exercise Name 6: toe walking  Sets/Reps 6: 2 min  Exercise 7  Exercise Name 7: wobble board double limb support circles  Sets/Reps 7: 30  Exercise 8  Exercise Name 8: sidesteps w/ TB around feet  Equipment/Resistance 8: RTB  Sets/Reps 8: 4 laps in parallel bars            Objective       Assessment & Plan     Assessment  Assessment details: Pt tolerated tx very well. She reported no increase in pn w/ exercises today. Progressed exercises to include CKC wobbleboard, BOSU activities for proprioception and strengthening, as well as sidestepping w/ TB around feet for resisted/isometric eversion. Pt attempting to wean from boot at work, able to tolerate 50% of time out of boot before onset of pn/soreness. No changes made to  HEP today.    Plan  Plan details: Cont w/ eversion strengthening, proprioception training, arch raises.               Timed:  Manual Therapy:    0     mins  88125;  Therapeutic Exercise:    30     mins  48910;     Neuromuscular Blas:    0    mins  37125;    Therapeutic Activity:     0     mins  13948;     Gait Trainin     mins  88113;     Ultrasound:     0     mins  33697;    Electrical Stimulation:    0     mins  01525 ( );    Untimed:  Electrical Stimulation:    0     mins  25662 ( );  Mechanical Traction:    0     mins  03602;     Timed Treatment:   30   mins   Total Treatment:     30   mins      Collette Crespo, PT  Physical Therapist

## 2020-08-27 ENCOUNTER — TREATMENT (OUTPATIENT)
Dept: PHYSICAL THERAPY | Facility: CLINIC | Age: 35
End: 2020-08-27

## 2020-08-27 DIAGNOSIS — G89.29 CHRONIC PAIN OF RIGHT ANKLE: Primary | ICD-10-CM

## 2020-08-27 DIAGNOSIS — R29.898 ANKLE WEAKNESS: ICD-10-CM

## 2020-08-27 DIAGNOSIS — M25.571 CHRONIC PAIN OF RIGHT ANKLE: Primary | ICD-10-CM

## 2020-08-27 PROCEDURE — 97110 THERAPEUTIC EXERCISES: CPT | Performed by: PHYSICAL THERAPIST

## 2020-08-27 NOTE — PROGRESS NOTES
Physical Therapy Daily Progress Note  Visit: 10      Patient: Andreina Segovia   : 1985  Referring practitioner: Silvana Reyes MD  Visit Diagnosis:     ICD-10-CM ICD-9-CM   1. Chronic pain of right ankle M25.571 719.47    G89.29 338.29   2. Ankle weakness R29.898 719.67     Date of Initial Visit: Type: THERAPY  Noted: 2020  Today's Date: 2020        Subjective     Andreina Segovia reports: Pt denies much pn this morning. Hasn't had much pn the past few days but has not been as active.    Treatment  Pre Treatment Pn Score: 1  Post Treatment Pn Score:  1    Exercise 1  Exercise Name 1: eccentric heel raises  Sets/Reps 1: 30  Exercise 2  Exercise Name 2: off step heel raises-knees extended/knees flexed  Sets/Reps 2: 30 ea  Exercise 3  Exercise Name 3: 1/2 FR SLS  Sets/Reps 3: 3  Time 3: 30 sec  Exercise 4  Exercise Name 4: BOSU step ups  Sets/Reps 4: 20  Exercise 5  Exercise Name 5: single leg RDL w/ arch raise, cone tap  Sets/Reps 5: 15  Exercise 6  Exercise Name 6: toe walking  Sets/Reps 6: 2 min  Exercise 7  Exercise Name 7: wobble board single limb support circles  Sets/Reps 7: 30  Exercise 8  Exercise Name 8: sidesteps w/ TB around feet  Equipment/Resistance 8: RTB  Sets/Reps 8: 4 laps in parallel bars            Objective       Assessment & Plan     Assessment  Assessment details: Progressed today to single leg stance on wobble board w/ active ankle motion for strengthening and proprioception. She was able to maintain full PF ROM w/ toe walking today, indicating improved strength. Pt reported only mild soreness along the lateral aspect of the mid foot at end of visit.    Plan  Plan details: Decrease freq to 1x/wk until return to MD. Continue to work on peroneal strengthening w/ eccentric and isometric loading. Add SLS w/ TB presses               Timed:  Manual Therapy:    0     mins  73117;  Therapeutic Exercise:    26     mins  18516;     Neuromuscular Blas:    0    mins   72444;    Therapeutic Activity:     0     mins  38808;     Gait Trainin     mins  74829;     Ultrasound:     0     mins  18546;    Electrical Stimulation:    0     mins  64903 ( );    Untimed:  Electrical Stimulation:    0     mins  21665 ( );  Mechanical Traction:    0     mins  78131;     Timed Treatment:   26   mins   Total Treatment:     28   mins      Collette Crespo, PT  Physical Therapist

## 2020-08-31 ENCOUNTER — TREATMENT (OUTPATIENT)
Dept: PHYSICAL THERAPY | Facility: CLINIC | Age: 35
End: 2020-08-31

## 2020-08-31 DIAGNOSIS — G89.29 CHRONIC PAIN OF RIGHT ANKLE: Primary | ICD-10-CM

## 2020-08-31 DIAGNOSIS — R29.898 ANKLE WEAKNESS: ICD-10-CM

## 2020-08-31 DIAGNOSIS — M25.571 CHRONIC PAIN OF RIGHT ANKLE: Primary | ICD-10-CM

## 2020-08-31 PROCEDURE — 97110 THERAPEUTIC EXERCISES: CPT | Performed by: PHYSICAL THERAPIST

## 2020-08-31 NOTE — PROGRESS NOTES
Physical Therapy Daily Progress Note  Visit: 11      Patient: Andreina Segovia   : 1985  Referring practitioner: Silvana Reyes MD  Visit Diagnosis:     ICD-10-CM ICD-9-CM   1. Chronic pain of right ankle M25.571 719.47    G89.29 338.29   2. Ankle weakness R29.898 719.67     Date of Initial Visit: Type: THERAPY  Noted: 2020  Today's Date: 2020        Subjective     Andreina Segovia reports: Pt states that she has no pn this morning and that symptoms have been minimal the past few days.    Treatment  Pre Treatment Pn Score: 0  Post Treatment Pn Score:  0    Exercise 1  Exercise Name 1: eccentric heel raises  Sets/Reps 1: 30  Exercise 2  Exercise Name 2: off step heel raises-knees extended/knees flexed  Sets/Reps 2: 30 ea  Exercise 3  Exercise Name 3: 1/2 FR SLS  Sets/Reps 3: 3  Time 3: 30 sec  Exercise 4  Exercise Name 4: BOSU step ups  Sets/Reps 4: 30  Exercise 5  Exercise Name 5: single leg RDL w/ arch raise, cone tap  Sets/Reps 5: 20  Exercise 6  Exercise Name 6: toe walking  Sets/Reps 6: 2 min  Exercise 7  Exercise Name 7: wobble board single limb support circles  Sets/Reps 7: 30 CW/CCW  Exercise 8  Exercise Name 8: sidesteps w/ TB around feet  Equipment/Resistance 8: RTB  Sets/Reps 8: 5 laps in parallel bars  Exercise 9  Exercise Name 9: SLS (toe on ground for balance) on airex w/ TB presses  Equipment/Resistance 9: GTB  Sets/Reps 9: 15 ea            Objective       Assessment & Plan     Assessment  Assessment details: Pt reports minimal to no pn over the past few days. Progressed reps w/ therex as tolerated today and added SLS on airex w/ TB presses. Pt appears to be challenged w/ current exercise regimen, but denies pn during or after her visits. No changes made to HEP today.    Plan  Plan details: Cont w/ current POC               Timed:  Manual Therapy:    0     mins  59177;  Therapeutic Exercise:    38     mins  38744;     Neuromuscular Blas:    0    mins  01478;     Therapeutic Activity:     0     mins  50316;     Gait Trainin     mins  21963;     Ultrasound:     0     mins  03181;    Electrical Stimulation:    0     mins  91635 ( );    Untimed:  Electrical Stimulation:    0     mins  81355 ( );  Mechanical Traction:    0     mins  07802;     Timed Treatment:   38   mins   Total Treatment:     38   mins      Collette Crespo, PT  Physical Therapist

## 2020-09-01 ENCOUNTER — TRANSCRIBE ORDERS (OUTPATIENT)
Dept: LAB | Facility: HOSPITAL | Age: 35
End: 2020-09-01

## 2020-09-01 ENCOUNTER — LAB (OUTPATIENT)
Dept: LAB | Facility: HOSPITAL | Age: 35
End: 2020-09-01

## 2020-09-01 DIAGNOSIS — N92.6 IRREGULAR MENSTRUAL CYCLE: ICD-10-CM

## 2020-09-01 DIAGNOSIS — N92.6 IRREGULAR MENSTRUAL CYCLE: Primary | ICD-10-CM

## 2020-09-01 LAB
ESTRADIOL SERPL HS-MCNC: 124 PG/ML
FSH SERPL-ACNC: 7.76 MIU/ML
LH SERPL-ACNC: 4.09 MIU/ML

## 2020-09-01 PROCEDURE — 83002 ASSAY OF GONADOTROPIN (LH): CPT

## 2020-09-01 PROCEDURE — 84443 ASSAY THYROID STIM HORMONE: CPT

## 2020-09-01 PROCEDURE — 82670 ASSAY OF TOTAL ESTRADIOL: CPT

## 2020-09-01 PROCEDURE — 83001 ASSAY OF GONADOTROPIN (FSH): CPT

## 2020-09-01 PROCEDURE — 36415 COLL VENOUS BLD VENIPUNCTURE: CPT

## 2020-09-02 LAB — TSH SERPL DL<=0.05 MIU/L-ACNC: 1.75 UIU/ML (ref 0.27–4.2)

## 2020-09-08 ENCOUNTER — TREATMENT (OUTPATIENT)
Dept: PHYSICAL THERAPY | Facility: CLINIC | Age: 35
End: 2020-09-08

## 2020-09-08 DIAGNOSIS — R29.898 ANKLE WEAKNESS: ICD-10-CM

## 2020-09-08 DIAGNOSIS — G89.29 CHRONIC PAIN OF RIGHT ANKLE: Primary | ICD-10-CM

## 2020-09-08 DIAGNOSIS — M25.571 CHRONIC PAIN OF RIGHT ANKLE: Primary | ICD-10-CM

## 2020-09-08 PROCEDURE — 97110 THERAPEUTIC EXERCISES: CPT | Performed by: PHYSICAL THERAPIST

## 2020-09-08 NOTE — PROGRESS NOTES
Physical Therapy Daily Progress Note  Visit: 12      Patient: Andreina Segovia   : 1985  Referring practitioner: Silvana Reyes MD  Visit Diagnosis:     ICD-10-CM ICD-9-CM   1. Chronic pain of right ankle M25.571 719.47    G89.29 338.29   2. Ankle weakness R29.898 719.67     Date of Initial Visit: Type: THERAPY  Noted: 2020  Today's Date: 2020        Subjective     Andreina Segovia reports: Pt states she has not worn her boot at work for the past two weeks. Has had minimal to no pn, has not had to take any medication for it.    Treatment  Pre Treatment Pn Score: 0  Post Treatment Pn Score:  0    Exercise 1  Exercise Name 1: eccentric heel raises-offstep  Sets/Reps 1: 30  Exercise 2  Exercise Name 2: off step heel raises-knees extended/knees flexed  Sets/Reps 2: 30 ea  Exercise 3  Exercise Name 3: 1/2 FR SLS  Sets/Reps 3: 3  Time 3: 30 sec  Exercise 4  Exercise Name 4: BOSU step ups  Sets/Reps 4: 30  Exercise 5  Exercise Name 5: single leg RDL w/ arch raise, cone tap  Sets/Reps 5: 20  Exercise 6  Exercise Name 6: toe walking  Sets/Reps 6: 2 min  Exercise 7  Exercise Name 7: wobble board single limb support circles  Sets/Reps 7: 30 CW/CCW  Exercise 8  Exercise Name 8: sidesteps w/ TB around feet  Equipment/Resistance 8: RTB  Sets/Reps 8: 5 laps in parallel bars  Exercise 9  Exercise Name 9: SLS (toe on ground for balance) on airex w/ TB presses  Equipment/Resistance 9: GTB  Sets/Reps 9: 20 ea            Objective          Active Range of Motion   Left Ankle/Foot   Normal active range of motion    Right Ankle/Foot   Normal active range of motion    Strength/Myotome Testing     Left Ankle/Foot   Dorsiflexion: 5  Plantar flexion: 5  Inversion: 5  Eversion: 5    Right Ankle/Foot   Dorsiflexion: 5  Plantar flexion: 5  Inversion: 5  Eversion: 5          Assessment & Plan     Assessment  Assessment details: Pt now fully weaned from boot w/ work and having no exacerbations of previous  symptoms. She is tolerating progressed exercise program well. She appears to be appropriately challenged but denies pn or significant soreness as result of her exercises. She exhibits full ankle strength and mobility. Pt agreeable to trial of independent HEP.  Reviewed comprehensive HEP.    Plan  Plan details: Trial independent HEP.               Timed:  Manual Therapy:    0     mins  54763;  Therapeutic Exercise:    24     mins  41156;     Neuromuscular Blas:    0    mins  83364;    Therapeutic Activity:     0     mins  83063;     Gait Trainin     mins  83394;     Ultrasound:     0     mins  73151;    Electrical Stimulation:    0     mins  42903 ( );    Untimed:  Electrical Stimulation:    0     mins  90638 ( );  Mechanical Traction:    0     mins  28203;     Timed Treatment:   24   mins   Total Treatment:     26   mins      Collette Crespo, PT  Physical Therapist

## 2020-10-12 ENCOUNTER — APPOINTMENT (OUTPATIENT)
Dept: PREADMISSION TESTING | Facility: HOSPITAL | Age: 35
End: 2020-10-12

## 2020-10-12 ENCOUNTER — OFFICE VISIT (OUTPATIENT)
Dept: ORTHOPEDIC SURGERY | Facility: CLINIC | Age: 35
End: 2020-10-12

## 2020-10-12 VITALS — BODY MASS INDEX: 47.11 KG/M2 | OXYGEN SATURATION: 97 % | HEIGHT: 59 IN | WEIGHT: 233.69 LBS | HEART RATE: 106 BPM

## 2020-10-12 VITALS — BODY MASS INDEX: 46.89 KG/M2 | WEIGHT: 232.6 LBS | HEIGHT: 59 IN

## 2020-10-12 DIAGNOSIS — M76.72 PERONEAL TENDINITIS OF LEFT LOWER EXTREMITY: Primary | ICD-10-CM

## 2020-10-12 LAB
ALBUMIN SERPL-MCNC: 4.3 G/DL (ref 3.5–5.2)
ALBUMIN/GLOB SERPL: 1.5 G/DL
ALP SERPL-CCNC: 99 U/L (ref 39–117)
ALT SERPL W P-5'-P-CCNC: 20 U/L (ref 1–33)
ANION GAP SERPL CALCULATED.3IONS-SCNC: 11 MMOL/L (ref 5–15)
AST SERPL-CCNC: 20 U/L (ref 1–32)
BILIRUB SERPL-MCNC: 0.2 MG/DL (ref 0–1.2)
BUN SERPL-MCNC: 13 MG/DL (ref 6–20)
BUN/CREAT SERPL: 17.6 (ref 7–25)
CALCIUM SPEC-SCNC: 9.3 MG/DL (ref 8.6–10.5)
CHLORIDE SERPL-SCNC: 101 MMOL/L (ref 98–107)
CO2 SERPL-SCNC: 26 MMOL/L (ref 22–29)
CREAT SERPL-MCNC: 0.74 MG/DL (ref 0.57–1)
DEPRECATED RDW RBC AUTO: 44.3 FL (ref 37–54)
ERYTHROCYTE [DISTWIDTH] IN BLOOD BY AUTOMATED COUNT: 13.8 % (ref 12.3–15.4)
GFR SERPL CREATININE-BSD FRML MDRD: 88 ML/MIN/1.73
GLOBULIN UR ELPH-MCNC: 2.8 GM/DL
GLUCOSE SERPL-MCNC: 105 MG/DL (ref 65–99)
HCT VFR BLD AUTO: 36.6 % (ref 34–46.6)
HGB BLD-MCNC: 11.5 G/DL (ref 12–15.9)
MCH RBC QN AUTO: 27.7 PG (ref 26.6–33)
MCHC RBC AUTO-ENTMCNC: 31.4 G/DL (ref 31.5–35.7)
MCV RBC AUTO: 88.2 FL (ref 79–97)
PLATELET # BLD AUTO: 276 10*3/MM3 (ref 140–450)
PMV BLD AUTO: 10.5 FL (ref 6–12)
POTASSIUM SERPL-SCNC: 4.2 MMOL/L (ref 3.5–5.2)
PROT SERPL-MCNC: 7.1 G/DL (ref 6–8.5)
RBC # BLD AUTO: 4.15 10*6/MM3 (ref 3.77–5.28)
SARS-COV-2 RNA RESP QL NAA+PROBE: NOT DETECTED
SODIUM SERPL-SCNC: 138 MMOL/L (ref 136–145)
WBC # BLD AUTO: 10.21 10*3/MM3 (ref 3.4–10.8)

## 2020-10-12 PROCEDURE — 93010 ELECTROCARDIOGRAM REPORT: CPT | Performed by: INTERNAL MEDICINE

## 2020-10-12 PROCEDURE — 99212 OFFICE O/P EST SF 10 MIN: CPT | Performed by: ORTHOPAEDIC SURGERY

## 2020-10-12 PROCEDURE — U0004 COV-19 TEST NON-CDC HGH THRU: HCPCS | Performed by: INTERNAL MEDICINE

## 2020-10-12 PROCEDURE — 80053 COMPREHEN METABOLIC PANEL: CPT | Performed by: SURGERY

## 2020-10-12 PROCEDURE — 85027 COMPLETE CBC AUTOMATED: CPT | Performed by: SURGERY

## 2020-10-12 PROCEDURE — 36415 COLL VENOUS BLD VENIPUNCTURE: CPT

## 2020-10-12 PROCEDURE — 93005 ELECTROCARDIOGRAM TRACING: CPT

## 2020-10-12 PROCEDURE — C9803 HOPD COVID-19 SPEC COLLECT: HCPCS

## 2020-10-12 NOTE — PROGRESS NOTES
"ESTABLISHED PATIENT    Patient: Andreina Brady [place]  : 1985    Primary Care Provider: Deepti Ly MD    Requesting Provider: As above    Follow-up (3 months- Peroneal tendinitis of left lower extremity)      History    Chief Complaint: Left peroneal tendinitis    History of Present Illness: She returns for follow-up of treatment of her left peroneal tendinitis, she feels much better.  She notes only occasional soreness when she is worked a long day.  She is doing her exercises and has her orthotics    Current Outpatient Medications on File Prior to Visit   Medication Sig Dispense Refill   • Cholecalciferol (VITAMIN D) 50 MCG (2000 UT) tablet Take 2,000 Units by mouth Daily.     • diphenhydrAMINE (BENADRYL) 25 MG tablet Take 1 tablet by mouth Every 6 (Six) Hours As Needed for Itching. 30 tablet 0   • EPINEPHrine (EPIPEN) 0.3 MG/0.3ML solution auto-injector injection Inject into outer thigh for severe allergic reaction. Call 911 after use. May substitute only with Mylan generic(NDC [address]-74) (Patient taking differently: As Needed.) 2 each 2   • frovatriptan (FROVA) 2.5 MG tablet Take 1 tablet by mouth at the onset of headache. If recurs, may repeat every 2 hours. Max of 3 tabs in 24 hours. (Patient taking differently: Take 2.5 mg by mouth 1 (One) Time As Needed.) 9 tablet 2   • levocetirizine (XYZAL) 5 MG tablet Take 1 tablet by mouth Daily. 30 tablet 11   • melatonin 5 MG tablet tablet Take 5 mg by mouth At Night As Needed.     • olopatadine (PATANOL) 0.1 % ophthalmic solution Administer 1 drop to both eyes 2 (Two) Times a Day As Needed. 5 mL 6   • pantoprazole (PROTONIX) 40 MG EC tablet Take 1 tablet by mouth Every Morning. Take on an EMPTY STOMACH 30 min to 1hr BEFORE the first meal of the day 30 tablet 2     No current facility-administered medications on file prior to visit.       Allergies   Allergen Reactions   • Adhesive Tape Other (See Comments)     \"Takes my skin off\"; hives   • " Clindamycin/Lincomycin Other (See Comments)     Pill esophagitis   • Naproxen Swelling   • Nsaids Angioedema      Past Medical History:   Diagnosis Date   • Bronchitis    • Dental crowns present     right side upper and lower - total of 3   • Diabetes in pregnancy     Gestational   • Infectious mononucleosis    • Migraines    • Ovarian cyst    • Wears glasses      Past Surgical History:   Procedure Laterality Date   • COLONOSCOPY     • DIAGNOSTIC LAPAROSCOPY N/A 1/4/2017    Procedure: DIAGNOSTIC LAPAROSCOPY, LAP BILATERAL SALPINGECTOMY;  Surgeon: Kaia Humphrey MD;  Location:  [facility];  Service:    • ENDOSCOPY N/A 3/21/2019    Procedure: EGD;  Surgeon: Wyatt Cool MD;  Location:  [facility];  Service: Gastroenterology   • SALPINGECTOMY     • WISDOM TOOTH EXTRACTION       Family History   Problem Relation Age of Onset   • Ulcerative colitis Mother    • Hypertension Mother    • Glaucoma Father    • Osteoarthritis Father    • Cancer Other    • Diabetes Other    • Heart attack Other    • Stroke Other       Social History     Socioeconomic History   • Marital status:      Spouse name: Not on file   • Number of children: Not on file   • Years of education: Not on file   • Highest education level: Not on file   Tobacco Use   • Smoking status: Never Smoker   • Smokeless tobacco: Never Used   Substance and Sexual Activity   • Alcohol use: Yes     Comment: 2 per week   • Drug use: No   • Sexual activity: Never        Review of Systems   Constitutional: Negative.    HENT: Negative.    Eyes: Negative.    Respiratory: Negative.    Cardiovascular: Negative.    Gastrointestinal: Negative.    Endocrine: Negative.    Genitourinary: Negative.    Musculoskeletal: Positive for arthralgias.   Skin: Negative.    Allergic/Immunologic: Negative.    Neurological: Negative.    Hematological: Negative.    Psychiatric/Behavioral: Negative.        The following portions of the patient's history were reviewed and  "updated as appropriate: allergies, current medications, past family history, past medical history, past social history, past surgical history and problem list.    Physical Exam:   Pulse 106   Ht 149.9 cm (59.02\")   Wt 106 kg (233 lb 11 oz)   SpO2 97%   BMI 47.17 kg/m²   GENERAL: Body habitus: morbidly obese    Lower extremity edema: Left: none; Right: none    Gait: normal     Mental Status:  awake and alert; oriented to person, place, and time  MSK:  Tibia:  ; Left:  non tender        Ankle: ; Left:  No tenderness over the peroneal tendons, no swelling, normal range of motion        Foot:  ; Left:  non tender    NEURO Sensation:  intact    Medical Decision Making    Data Review:   none    Assessment/Plan/Diagnosis/Treatment Options:   1. Peroneal tendinitis of left lower extremity  She is much improved, she should continue her exercises on her own, continue the orthotics, I will be happy to see her anytime                            "

## 2020-10-14 ENCOUNTER — HOSPITAL ENCOUNTER (OUTPATIENT)
Facility: HOSPITAL | Age: 35
Setting detail: HOSPITAL OUTPATIENT SURGERY
Discharge: HOME | End: 2020-10-14
Attending: SURGERY | Admitting: SURGERY

## 2020-10-14 ENCOUNTER — APPOINTMENT (OUTPATIENT)
Dept: GENERAL RADIOLOGY | Facility: HOSPITAL | Age: 35
End: 2020-10-14

## 2020-10-14 ENCOUNTER — ANESTHESIA (OUTPATIENT)
Dept: PERIOP | Facility: HOSPITAL | Age: 35
End: 2020-10-14

## 2020-10-14 ENCOUNTER — ANESTHESIA EVENT (OUTPATIENT)
Dept: PERIOP | Facility: HOSPITAL | Age: 35
End: 2020-10-14

## 2020-10-14 VITALS
WEIGHT: 233 LBS | HEIGHT: 59 IN | OXYGEN SATURATION: 93 % | TEMPERATURE: 98 F | SYSTOLIC BLOOD PRESSURE: 137 MMHG | RESPIRATION RATE: 16 BRPM | BODY MASS INDEX: 46.97 KG/M2 | DIASTOLIC BLOOD PRESSURE: 87 MMHG | HEART RATE: 71 BPM

## 2020-10-14 DIAGNOSIS — K82.8 BILIARY DYSKINESIA: Primary | ICD-10-CM

## 2020-10-14 LAB
B-HCG UR QL: NEGATIVE
INTERNAL NEGATIVE CONTROL: NEGATIVE
INTERNAL POSITIVE CONTROL: POSITIVE
Lab: NORMAL

## 2020-10-14 PROCEDURE — 88304 TISSUE EXAM BY PATHOLOGIST: CPT | Performed by: SURGERY

## 2020-10-14 PROCEDURE — 25010000002 PROPOFOL 10 MG/ML EMULSION: Performed by: NURSE ANESTHETIST, CERTIFIED REGISTERED

## 2020-10-14 PROCEDURE — 25010000002 FENTANYL CITRATE (PF) 100 MCG/2ML SOLUTION: Performed by: NURSE ANESTHETIST, CERTIFIED REGISTERED

## 2020-10-14 PROCEDURE — 25010000002 FENTANYL CITRATE (PF) 100 MCG/2ML SOLUTION: Performed by: ANESTHESIOLOGY

## 2020-10-14 PROCEDURE — 25010000002 ONDANSETRON PER 1 MG: Performed by: NURSE ANESTHETIST, CERTIFIED REGISTERED

## 2020-10-14 PROCEDURE — 74300 X-RAY BILE DUCTS/PANCREAS: CPT

## 2020-10-14 PROCEDURE — 81025 URINE PREGNANCY TEST: CPT | Performed by: ANESTHESIOLOGY

## 2020-10-14 PROCEDURE — 25010000002 IOPAMIDOL 61 % SOLUTION: Performed by: SURGERY

## 2020-10-14 PROCEDURE — 25010000003 LIDOCAINE 1 % SOLUTION: Performed by: NURSE ANESTHETIST, CERTIFIED REGISTERED

## 2020-10-14 PROCEDURE — 25010000003 CEFAZOLIN IN DEXTROSE 2-4 GM/100ML-% SOLUTION: Performed by: SURGERY

## 2020-10-14 PROCEDURE — 25010000002 NEOSTIGMINE 10 MG/10ML SOLUTION: Performed by: NURSE ANESTHETIST, CERTIFIED REGISTERED

## 2020-10-14 PROCEDURE — 25010000002 DEXAMETHASONE PER 1 MG: Performed by: NURSE ANESTHETIST, CERTIFIED REGISTERED

## 2020-10-14 DEVICE — LIGACLIP 10-M/L, 10MM ENDOSCOPIC ROTATING MULTIPLE CLIP APPLIERS
Type: IMPLANTABLE DEVICE | Status: FUNCTIONAL
Brand: LIGACLIP

## 2020-10-14 RX ORDER — NEOSTIGMINE METHYLSULFATE 1 MG/ML
INJECTION, SOLUTION INTRAVENOUS AS NEEDED
Status: DISCONTINUED | OUTPATIENT
Start: 2020-10-14 | End: 2020-10-14 | Stop reason: SURG

## 2020-10-14 RX ORDER — FENTANYL CITRATE 50 UG/ML
INJECTION, SOLUTION INTRAMUSCULAR; INTRAVENOUS AS NEEDED
Status: DISCONTINUED | OUTPATIENT
Start: 2020-10-14 | End: 2020-10-14 | Stop reason: SURG

## 2020-10-14 RX ORDER — OXYCODONE HYDROCHLORIDE AND ACETAMINOPHEN 5; 325 MG/1; MG/1
1 TABLET ORAL ONCE AS NEEDED
Status: COMPLETED | OUTPATIENT
Start: 2020-10-14 | End: 2020-10-14

## 2020-10-14 RX ORDER — MAGNESIUM HYDROXIDE 1200 MG/15ML
LIQUID ORAL AS NEEDED
Status: DISCONTINUED | OUTPATIENT
Start: 2020-10-14 | End: 2020-10-14 | Stop reason: HOSPADM

## 2020-10-14 RX ORDER — ONDANSETRON 2 MG/ML
INJECTION INTRAMUSCULAR; INTRAVENOUS AS NEEDED
Status: DISCONTINUED | OUTPATIENT
Start: 2020-10-14 | End: 2020-10-14 | Stop reason: SURG

## 2020-10-14 RX ORDER — LIDOCAINE HYDROCHLORIDE 10 MG/ML
0.5 INJECTION, SOLUTION EPIDURAL; INFILTRATION; INTRACAUDAL; PERINEURAL ONCE AS NEEDED
Status: COMPLETED | OUTPATIENT
Start: 2020-10-14 | End: 2020-10-14

## 2020-10-14 RX ORDER — HYDROMORPHONE HYDROCHLORIDE 1 MG/ML
0.5 INJECTION, SOLUTION INTRAMUSCULAR; INTRAVENOUS; SUBCUTANEOUS
Status: DISCONTINUED | OUTPATIENT
Start: 2020-10-14 | End: 2020-10-14 | Stop reason: HOSPADM

## 2020-10-14 RX ORDER — PROMETHAZINE HYDROCHLORIDE 25 MG/1
25 TABLET ORAL EVERY 6 HOURS PRN
Qty: 30 TABLET | Refills: 0 | Status: SHIPPED | OUTPATIENT
Start: 2020-10-14 | End: 2021-04-09

## 2020-10-14 RX ORDER — OXYCODONE HYDROCHLORIDE AND ACETAMINOPHEN 5; 325 MG/1; MG/1
1 TABLET ORAL EVERY 4 HOURS PRN
Qty: 17 TABLET | Refills: 0 | Status: SHIPPED | OUTPATIENT
Start: 2020-10-14 | End: 2021-04-09

## 2020-10-14 RX ORDER — LIDOCAINE HYDROCHLORIDE 10 MG/ML
INJECTION, SOLUTION INFILTRATION; PERINEURAL AS NEEDED
Status: DISCONTINUED | OUTPATIENT
Start: 2020-10-14 | End: 2020-10-14 | Stop reason: SURG

## 2020-10-14 RX ORDER — FAMOTIDINE 10 MG/ML
20 INJECTION, SOLUTION INTRAVENOUS ONCE
Status: CANCELLED | OUTPATIENT
Start: 2020-10-14 | End: 2020-10-14

## 2020-10-14 RX ORDER — GLYCOPYRROLATE 0.2 MG/ML
INJECTION INTRAMUSCULAR; INTRAVENOUS AS NEEDED
Status: DISCONTINUED | OUTPATIENT
Start: 2020-10-14 | End: 2020-10-14 | Stop reason: SURG

## 2020-10-14 RX ORDER — CEFAZOLIN SODIUM 2 G/100ML
2 INJECTION, SOLUTION INTRAVENOUS ONCE
Status: COMPLETED | OUTPATIENT
Start: 2020-10-14 | End: 2020-10-14

## 2020-10-14 RX ORDER — FENTANYL CITRATE 50 UG/ML
50 INJECTION, SOLUTION INTRAMUSCULAR; INTRAVENOUS
Status: DISCONTINUED | OUTPATIENT
Start: 2020-10-14 | End: 2020-10-14 | Stop reason: HOSPADM

## 2020-10-14 RX ORDER — DEXAMETHASONE SODIUM PHOSPHATE 4 MG/ML
INJECTION, SOLUTION INTRA-ARTICULAR; INTRALESIONAL; INTRAMUSCULAR; INTRAVENOUS; SOFT TISSUE AS NEEDED
Status: DISCONTINUED | OUTPATIENT
Start: 2020-10-14 | End: 2020-10-14 | Stop reason: SURG

## 2020-10-14 RX ORDER — DOCUSATE SODIUM 250 MG
250 CAPSULE ORAL 2 TIMES DAILY
Qty: 20 CAPSULE | Refills: 0 | Status: SHIPPED | OUTPATIENT
Start: 2020-10-14 | End: 2021-04-09

## 2020-10-14 RX ORDER — SODIUM CHLORIDE 0.9 % (FLUSH) 0.9 %
10 SYRINGE (ML) INJECTION AS NEEDED
Status: CANCELLED | OUTPATIENT
Start: 2020-10-14

## 2020-10-14 RX ORDER — SODIUM CHLORIDE, SODIUM LACTATE, POTASSIUM CHLORIDE, CALCIUM CHLORIDE 600; 310; 30; 20 MG/100ML; MG/100ML; MG/100ML; MG/100ML
INJECTION, SOLUTION INTRAVENOUS CONTINUOUS PRN
Status: DISCONTINUED | OUTPATIENT
Start: 2020-10-14 | End: 2020-10-14 | Stop reason: SURG

## 2020-10-14 RX ORDER — DOCUSATE SODIUM 100 MG/1
100 CAPSULE, LIQUID FILLED ORAL ONCE
Status: DISCONTINUED | OUTPATIENT
Start: 2020-10-14 | End: 2020-10-14 | Stop reason: HOSPADM

## 2020-10-14 RX ORDER — BUPIVACAINE HYDROCHLORIDE AND EPINEPHRINE 2.5; 5 MG/ML; UG/ML
INJECTION, SOLUTION EPIDURAL; INFILTRATION; INTRACAUDAL; PERINEURAL AS NEEDED
Status: DISCONTINUED | OUTPATIENT
Start: 2020-10-14 | End: 2020-10-14 | Stop reason: HOSPADM

## 2020-10-14 RX ORDER — SODIUM CHLORIDE 9 MG/ML
INJECTION, SOLUTION INTRAVENOUS AS NEEDED
Status: DISCONTINUED | OUTPATIENT
Start: 2020-10-14 | End: 2020-10-14 | Stop reason: HOSPADM

## 2020-10-14 RX ORDER — PROPOFOL 10 MG/ML
VIAL (ML) INTRAVENOUS AS NEEDED
Status: DISCONTINUED | OUTPATIENT
Start: 2020-10-14 | End: 2020-10-14 | Stop reason: SURG

## 2020-10-14 RX ORDER — FAMOTIDINE 20 MG/1
20 TABLET, FILM COATED ORAL ONCE
Status: COMPLETED | OUTPATIENT
Start: 2020-10-14 | End: 2020-10-14

## 2020-10-14 RX ORDER — SODIUM CHLORIDE 0.9 % (FLUSH) 0.9 %
10 SYRINGE (ML) INJECTION EVERY 12 HOURS SCHEDULED
Status: CANCELLED | OUTPATIENT
Start: 2020-10-14

## 2020-10-14 RX ORDER — ROCURONIUM BROMIDE 10 MG/ML
INJECTION, SOLUTION INTRAVENOUS AS NEEDED
Status: DISCONTINUED | OUTPATIENT
Start: 2020-10-14 | End: 2020-10-14 | Stop reason: SURG

## 2020-10-14 RX ORDER — SODIUM CHLORIDE, SODIUM LACTATE, POTASSIUM CHLORIDE, CALCIUM CHLORIDE 600; 310; 30; 20 MG/100ML; MG/100ML; MG/100ML; MG/100ML
9 INJECTION, SOLUTION INTRAVENOUS CONTINUOUS
Status: DISCONTINUED | OUTPATIENT
Start: 2020-10-14 | End: 2020-10-14 | Stop reason: HOSPADM

## 2020-10-14 RX ORDER — PROMETHAZINE HYDROCHLORIDE 12.5 MG/1
12.5 TABLET ORAL ONCE AS NEEDED
Status: DISCONTINUED | OUTPATIENT
Start: 2020-10-14 | End: 2020-10-14 | Stop reason: HOSPADM

## 2020-10-14 RX ADMIN — OXYCODONE HYDROCHLORIDE AND ACETAMINOPHEN 1 TABLET: 5; 325 TABLET ORAL at 14:44

## 2020-10-14 RX ADMIN — PROPOFOL 200 MG: 10 INJECTION, EMULSION INTRAVENOUS at 12:51

## 2020-10-14 RX ADMIN — LIDOCAINE HYDROCHLORIDE 0.5 ML: 10 INJECTION, SOLUTION EPIDURAL; INFILTRATION; INTRACAUDAL; PERINEURAL at 11:39

## 2020-10-14 RX ADMIN — DEXAMETHASONE SODIUM PHOSPHATE 8 MG: 4 INJECTION, SOLUTION INTRAMUSCULAR; INTRAVENOUS at 13:01

## 2020-10-14 RX ADMIN — GLYCOPYRROLATE 0.2 MG: 0.2 INJECTION INTRAMUSCULAR; INTRAVENOUS at 13:50

## 2020-10-14 RX ADMIN — FAMOTIDINE 20 MG: 20 TABLET, FILM COATED ORAL at 11:39

## 2020-10-14 RX ADMIN — ROCURONIUM BROMIDE 40 MG: 10 INJECTION INTRAVENOUS at 12:51

## 2020-10-14 RX ADMIN — SODIUM CHLORIDE, POTASSIUM CHLORIDE, SODIUM LACTATE AND CALCIUM CHLORIDE 9 ML/HR: 600; 310; 30; 20 INJECTION, SOLUTION INTRAVENOUS at 11:39

## 2020-10-14 RX ADMIN — GLYCOPYRROLATE 0.4 MG: 0.2 INJECTION INTRAMUSCULAR; INTRAVENOUS at 13:45

## 2020-10-14 RX ADMIN — FENTANYL CITRATE 50 MCG: 0.05 INJECTION, SOLUTION INTRAMUSCULAR; INTRAVENOUS at 14:08

## 2020-10-14 RX ADMIN — FENTANYL CITRATE 100 MCG: 50 INJECTION, SOLUTION INTRAMUSCULAR; INTRAVENOUS at 12:51

## 2020-10-14 RX ADMIN — CEFAZOLIN SODIUM 2 G: 2 INJECTION, SOLUTION INTRAVENOUS at 12:48

## 2020-10-14 RX ADMIN — SODIUM CHLORIDE, POTASSIUM CHLORIDE, SODIUM LACTATE AND CALCIUM CHLORIDE: 600; 310; 30; 20 INJECTION, SOLUTION INTRAVENOUS at 12:48

## 2020-10-14 RX ADMIN — FENTANYL CITRATE 50 MCG: 0.05 INJECTION, SOLUTION INTRAMUSCULAR; INTRAVENOUS at 14:21

## 2020-10-14 RX ADMIN — ONDANSETRON 4 MG: 2 INJECTION INTRAMUSCULAR; INTRAVENOUS at 13:45

## 2020-10-14 RX ADMIN — LIDOCAINE HYDROCHLORIDE 50 MG: 10 INJECTION, SOLUTION INFILTRATION; PERINEURAL at 12:51

## 2020-10-14 RX ADMIN — NEOSTIGMINE 3 MG: 1 INJECTION INTRAVENOUS at 13:45

## 2020-10-14 NOTE — THERAPY PROGRESS REPORT/RE-CERT
Outpatient Physical Therapy Ortho Re-Assessment  [facility]     Patient Name: Andreina Segovia  : 1985  MRN: [id number]  Today's Date: 2018      Visit Date: 2018    Patient Active Problem List   Diagnosis   • Pelvic pain   • Metatarsalgia of left foot        Past Medical History:   Diagnosis Date   • Bronchitis    • Diabetes in pregnancy    • Infectious mononucleosis    • Ovarian cyst    • Wears glasses         Past Surgical History:   Procedure Laterality Date   • COLONOSCOPY     • DIAGNOSTIC LAPAROSCOPY N/A 2017    Procedure: DIAGNOSTIC LAPAROSCOPY, LAP BILATERAL SALPINGECTOMY;  Surgeon: Kaia Humphrey MD;  Location: [facility];  Service:    • SALPINGECTOMY     • WISDOM TOOTH EXTRACTION         Visit Dx:     ICD-10-CM ICD-9-CM   1. Injury of right shoulder, initial encounter S49.91XA 959.2                 PT Ortho       18 1400    Subjective Comments    Subjective Comments Pn gradually improving, hasn't bothered her much over the past few days. Did have some pn a couple of hours after giving chest compressions while administering CPR.  -RB    Subjective Pain    Able to rate subjective pain? yes  -RB    Pre-Treatment Pain Level 0  -RB    Post-Treatment Pain Level 2  -RB    Shoulder Impingement/Rotator Cuff Special Tests    Ordoñez-Theron Test (RC Lesion vs. Bursitis) Positive  -RB    Empty Can Test (RC Lesion) Negative  -RB    Right Shoulder    Flexion AROM Deficit 170  -RB    ABduction AROM Deficit 170  -RB    External Rotation AROM Deficit 80  -RB    Internal Rotation AROM Deficit T8  -RB    Right Shoulder    Flexion Gross Movement (5/5) normal  -RB    ABduction Gross Movement (5/5) normal  -RB    Int Rotation Gross Movement (5/5) normal  -RB    Ext Rotation Gross Movement (5/5) normal  -RB    Right Elbow/Forearm    Elbow Flexion Gross Movement (5/5) normal  -RB    Elbow Extension Gross Movement (5/5) normal  -RB      User Key  (r) = Recorded By, (t) = Taken By, (c) =  From: Veronique Braga  To: Axel Jackson  Sent: 10/14/2020 11:29 AM CDT  Subject: Lab Test or Test Related Question    ok that's fine   Cosigned By    Initials Name Provider Type    RB Collette Crespo, PT Physical Therapist                                  PT OP Goals       02/20/18 1541 02/20/18 1400    PT Short Term Goals    STG Date to Achieve  11/27/17  -RB    STG 1  Pt to be compliant w/ initial HEP for flexibility and strengthening  -RB    STG 1 Progress  Met  -RB    STG 2  Pt to demo full AROM of the R shoulder w/ minimal to no pn.  -RB    STG 2 Progress  Met  -RB    STG 3  Pt to report pn w/ daily activities no greater than 4/10.  -RB    STG 3 Progress  Met  -RB    Long Term Goals    LTG Date to Achieve  12/18/17  -RB    LTG 1  Pt to be independent w/ long term HEP for strengthening, flexibility, symptom mgmt  -RB    LTG 1 Progress  Ongoing  -RB    LTG 2  Pt to improve R shoulder/elbow strength to at least 4+/5 in all planes w/ minimal to no pn upon testing.  -RB    LTG 2 Progress  Met  -RB    LTG 3  Pt to improve QD score by at least 20% to reflect improved pn and function.  -RB    LTG 3 Progress  Met  -RB    Time Calculation    PT Goal Re-Cert Due Date 05/07/18  -RB 05/07/18  -RB      User Key  (r) = Recorded By, (t) = Taken By, (c) = Cosigned By    Initials Name Provider Type    RB Collette Crespo, PT Physical Therapist                PT Assessment/Plan       02/20/18 1539       PT Assessment    Functional Limitations Limitations in functional capacity and performance;Performance in work activities  -RB     Impairments Pain  -RB     Assessment Comments Pt continues to make good progress w/ strength, mobility, pn, and function. She has regained full ROM in all planes w/ only slight discomfort at end range elevation. Strength intact, again w/ slight pn upon resisted abduction. Pt would benefit from 1-2 additional visits to maximize strength and established independence w/ final HEP.  -RB     Please refer to paper survey for additional self-reported information Yes  -RB     Rehab Potential Good  -RB     Patient/caregiver participated in  establishment of treatment plan and goals Yes  -RB     Patient would benefit from skilled therapy intervention Yes  -RB     PT Plan    PT Plan Comments Cont POC 1-2 more visits to establish comprehensive HEP, then plan d/c.  -RB       User Key  (r) = Recorded By, (t) = Taken By, (c) = Cosigned By    Initials Name Provider Type    COLETTE Crespo, PT Physical Therapist                  Exercises       02/20/18 1400          Subjective Comments    Subjective Comments Pn gradually improving, hasn't bothered her much over the past few days. Did have some pn a couple of hours after giving chest compressions while administering CPR.  -RB      Subjective Pain    Able to rate subjective pain? yes  -RB      Pre-Treatment Pain Level 0  -RB      Post-Treatment Pain Level 2  -RB      Exercise 1    Exercise Name 1 Performed reassessment, then continued w/ ther ex in clinic per flow sheet. Added IR w/ TB, progressed to GTB w/ all ther ex except serratus slides. Added OH press w/ DB.  -RB      Time (Minutes) 1 30  -RB        User Key  (r) = Recorded By, (t) = Taken By, (c) = Cosigned By    Initials Name Provider Type    COLETTE Crespo, PT Physical Therapist                        Outcome Measure Options: Quick DASH  Quick DASH  Open a tight or new jar.: No Difficulty  Do heavy household chores (e.g., wash walls, wash floors): Mild Difficulty  Carry a shopping bag or briefcase: Mild Difficulty  Wash your back: Mild Difficulty  Use a knife to cut food: No Difficulty  Recreational activities in which you take some force or impact through your arm, should or hand (e.g. golf, hammering, tennis, etc.): Moderate Difficulty  During the past week, to what extent has your arm, shoulder, or hand problem interfered with your normal social activites with family, friends, neighbors or groups?: Slightly  During the past week, were you limited in your work or other regular daily activities as a result of your arm, shoulder or hand problem?:  Slightly Limited  Arm, Shoulder, or hand pain: Mild  Tingling (pins and needles) in your arm, shoulder, or hand: None  During the past week, how much difficulty have you had sleeping because of the pain in your arm, shoulder or hand?: Mild Difficulty  Number of Questions Answered: 11  Quick DASH Score: 20.45         Time Calculation:   Start Time: 1400  Total Timed Code Minutes- PT: 30 minute(s)     Therapy Charges for Today     Code Description Service Date Service Provider Modifiers Qty    04770297794 HC PT THER PROC EA 15 MIN 2/20/2018 Collette Crespo, PT GP 2          PT G-Codes  Outcome Measure Options: Quick DASH         Collette Crespo, PT  2/20/2018

## 2020-10-14 NOTE — ANESTHESIA PREPROCEDURE EVALUATION
Anesthesia Evaluation     Patient summary reviewed and Nursing notes reviewed                Airway   Mallampati: I  TM distance: >3 FB  Neck ROM: full  No difficulty expected  Dental - normal exam     Pulmonary - negative pulmonary ROS and normal exam   Cardiovascular - negative cardio ROS and normal exam        Neuro/Psych  (+) headaches,     GI/Hepatic/Renal/Endo    (+) morbid obesity, GERD,  diabetes mellitus gestational,     Musculoskeletal (-) negative ROS    Abdominal  - normal exam    Bowel sounds: normal.   Substance History - negative use     OB/GYN negative ob/gyn ROS         Other                        Anesthesia Plan    ASA 3     general     intravenous induction     Anesthetic plan, all risks, benefits, and alternatives have been provided, discussed and informed consent has been obtained with: patient.    Plan discussed with CRNA.

## 2020-10-14 NOTE — ANESTHESIA PROCEDURE NOTES
Airway  Urgency: elective    Date/Time: 10/14/2020 12:53 PM  Airway not difficult    General Information and Staff    Patient location during procedure: OR  CRNA: Zainab Garcia CRNA    Indications and Patient Condition  Indications for airway management: airway protection    Preoxygenated: yes  MILS not maintained throughout  Mask difficulty assessment: 0 - not attempted    Final Airway Details  Final airway type: endotracheal airway      Successful airway: ETT  Cuffed: yes   Successful intubation technique: direct laryngoscopy  Facilitating devices/methods: intubating stylet  Endotracheal tube insertion site: oral  Blade: Gooden  Blade size: 2  ETT size (mm): 7.0  Cormack-Lehane Classification: grade I - full view of glottis  Placement verified by: chest auscultation and capnometry   Measured from: lips  ETT/EBT  to lips (cm): 20  Number of attempts at approach: 1  Assessment: lips, teeth, and gum same as pre-op and atraumatic intubation    Additional Comments  Negative epigastric sounds, Breath sound equal bilaterally with symmetric chest rise and fall

## 2020-10-14 NOTE — H&P
"Pre-Op H&P  Andreina Brady [place]  [id number]  1985    Chief complaint: Abdominal pain    HPI:    Patient is a 35 y.o.female who presents with a history of chronic right upper quadrant abdominal pain. Surgical intervention is recommended and she is agreeable. She is here today for a laparoscopic cholecystectomy with intraoperative cholangiogram.    Review of Systems:  General ROS: negative for chills, fever or skin lesions;  No changes since last office visit.  Neg for recent sick exposure  Cardiovascular ROS: no chest pain or dyspnea on exertion  Respiratory ROS: no cough, shortness of breath, or wheezing    Allergies:   Allergies   Allergen Reactions   • Adhesive Tape Other (See Comments)     \"Takes my skin off\"; hives   • Clindamycin/Lincomycin Other (See Comments)     Pill esophagitis   • Naproxen Swelling   • Nsaids Angioedema       Home Meds:    No current facility-administered medications on file prior to encounter.      Current Outpatient Medications on File Prior to Encounter   Medication Sig Dispense Refill   • Cholecalciferol (VITAMIN D) 50 MCG (2000 UT) tablet Take 2,000 Units by mouth Daily.     • diphenhydrAMINE (BENADRYL) 25 MG tablet Take 1 tablet by mouth Every 6 (Six) Hours As Needed for Itching. 30 tablet 0   • levocetirizine (XYZAL) 5 MG tablet Take 1 tablet by mouth Daily. 30 tablet 11   • melatonin 5 MG tablet tablet Take 5 mg by mouth At Night As Needed.     • pantoprazole (PROTONIX) 40 MG EC tablet Take 1 tablet by mouth Every Morning. Take on an EMPTY STOMACH 30 min to 1hr BEFORE the first meal of the day 30 tablet 2   • EPINEPHrine (EPIPEN) 0.3 MG/0.3ML solution auto-injector injection Inject into outer thigh for severe allergic reaction. Call 911 after use. May substitute only with Mylan generic(NDC [address]-38) (Patient taking differently: As Needed.) 2 each 2   • frovatriptan (FROVA) 2.5 MG tablet Take 1 tablet by mouth at the onset of headache. If recurs, may repeat every 2 " "hours. Max of 3 tabs in 24 hours. (Patient taking differently: Take 2.5 mg by mouth 1 (One) Time As Needed.) 9 tablet 2   • olopatadine (PATANOL) 0.1 % ophthalmic solution Administer 1 drop to both eyes 2 (Two) Times a Day As Needed. 5 mL 6       PMH:   Past Medical History:   Diagnosis Date   • Bronchitis    • Dental crowns present     right side upper and lower - total of 3   • Gestational diabetes     Gestational diabetes with pregnancy, No medication   • Infectious mononucleosis    • Migraines    • Ovarian cyst    • Wears glasses      PSH:    Past Surgical History:   Procedure Laterality Date   • COLONOSCOPY     • DIAGNOSTIC LAPAROSCOPY N/A 1/4/2017    Procedure: DIAGNOSTIC LAPAROSCOPY, LAP BILATERAL SALPINGECTOMY;  Surgeon: Kaia Humphrey MD;  Location:  [facility];  Service:    • ENDOSCOPY N/A 3/21/2019    Procedure: EGD;  Surgeon: Wyatt Cool MD;  Location:  [facility];  Service: Gastroenterology   • SALPINGECTOMY     • WISDOM TOOTH EXTRACTION           Social History:   Tobacco:   Social History     Tobacco Use   Smoking Status Never Smoker   Smokeless Tobacco Never Used      Alcohol:     Social History     Substance and Sexual Activity   Alcohol Use Yes    Comment: 2 per week       Vitals:           /83 (BP Location: Right arm, Patient Position: Lying)   Pulse 79   Temp 98.1 °F (36.7 °C) (Temporal)   Resp 20   Ht 149.9 cm (59\")   Wt 106 kg (233 lb)   SpO2 98%   BMI 47.06 kg/m²     Physical Exam:  General Appearance:    Alert, cooperative, no distress, appears stated age   Head:    Normocephalic, without obvious abnormality, atraumatic   Lungs:     Clear to auscultation bilaterally, respirations unlabored    Heart:   Regular rate and rhythm, S1 and S2 normal, no murmur, rub    or gallop    Abdomen:    Soft, non-tender, +bowel sounds   Breast Exam:    deferred   Genitalia:    deferred   Extremities:   Extremities normal, atraumatic, no cyanosis or edema   Skin:   Skin color, " texture, turgor normal, no rashes or lesions   Neurologic:   Grossly intact   Results Review  LABS:  Lab Results   Component Value Date    WBC 10.21 10/12/2020    HGB 11.5 (L) 10/12/2020    HCT 36.6 10/12/2020    MCV 88.2 10/12/2020     10/12/2020    NEUTROABS 5.90 05/24/2020    GLUCOSE 105 (H) 10/12/2020    BUN 13 10/12/2020    CREATININE 0.74 10/12/2020    EGFRIFNONA 88 10/12/2020     10/12/2020    K 4.2 10/12/2020     10/12/2020    CO2 26.0 10/12/2020    CALCIUM 9.3 10/12/2020    ALBUMIN 4.30 10/12/2020    AST 20 10/12/2020    ALT 20 10/12/2020    BILITOT 0.2 10/12/2020    INR 0.92 01/15/2017       RADIOLOGY:  Imaging Results (Last 72 Hours)     ** No results found for the last 72 hours. **          I reviewed the patient's new clinical results.     10/12/20 COVID-19: negative  10/12/20 ECG: negative    Cancer Staging (if applicable)  Cancer Patient: __ yes _X_no __unknown; If yes, clinical stage T:__ N:__M:__, stage group or __N/A    Impression: Biliary dyskinesia    Plan: Laparoscopic cholecystectomy with intraoperative cholangiogram      Jewell Holland, GISELLE   10/14/2020   11:32 EDT

## 2020-10-14 NOTE — OP NOTE
Operative Report    Patient Name:  Andreina Brady [place]  YOB: 1985  [id number]  10/14/2020      PREOPERATIVE DIAGNOSIS: Biliary dyskinesia      POSTOPERATIVE DIAGNOSIS: Same        PROCEDURE PERFORMED:     Laparoscopic cholecystectomy with intra-operative cholangiography        SURGEON: Artie Esparza MD      ASSISTANT: Mary Miguel MD       SPECIMENS: Gallbladder and contents        ANESTHESIA: General.        FINDINGS:     1. Gallbladder in standard positioning     2. Intra-operative cholangiogram demonstrated excellent filling of the cystic, common, right and left hepatic ducts with good flow of contrast into the duodenum without retained stone or filling defect        INDICATIONS:      The patient is a 35 y.o. female with a history of abdominal pain, concerning for biliary dyskinesia. Pre-operative imaging including HIDA scan confirmed the diagnosis. The risks and benefits of Laparoscopic cholecystectomy with cholangiography were discussed with the patient and their family and they agree to proceed.        DESCRIPTION OF PROCEDURE:     After obtaining informed consent, the patient was taken to the operating room and placed in the supine position. After appropriate DVT and antibiotic prophylaxis, general anesthesia was induced. The abdomen was prepped and draped in standard sterile fashion, and after infiltrating the skin with local anesthetic, a 12mm skin incision was made superior to the umbilicus. Blunt dissection was carried down to the fascia, which was grasped with a Kocher clamp and elevated anteriorly. A vertical midline incision was made in the fascia, and blunt dissection was carried down into the peritoneal cavity. A stay suture of 0 Vicryl was then placed in figure-of-eight fashion around the defect, and a blunt trocar advanced without difficulty into the abdominal cavity.  The abdomen was insufflated with carbon dioxide gas to a pressure of 15 mmHg, and a laparoscope advanced  "through the trocar and the abdominal contents were inspected. There was no evidence of bowel, bladder, or visceral injury with entrance of the trocar . At this point, after infiltrating the skin with local anesthetic, a standard laparoscopic cholecystectomy trocar placement schema was followed.     The gallbladder was grasped and elevated superiorly. Using meticulous blunt dissection , the cystic duct and cystic artery were bluntly dissected free of other structures and clearly identified using the \"Critical View\" technique. The cystic artery was then clipped twice proximally and once distally, and divided between the clips.     The cystic duct was then clipped at its junction with the infundibulum of the gallbladder, and transected across 50% of its circumference. A cholangiogram catheter was then placed within the duct, and on-table cholangiography under fluoroscopy was obtained. There was excellent filling of the cystic, common, right and left hepatic ducts with good flow of contrast into the duodenum without retained stone or filling defect  . The cholangiogram catheter was then removed, and the cystic duct was ligated using a 2-0 silk suture tied laparoscopically,  reinforced with hemoclips, and divided.     The gallbladder was then dissected free of the gallbladder fossa using a combination of electrocautery and blunt dissection . There was a small posterior branch of the artery that was clipped and divided . The gallbladder was then placed in an Endo Catch bag, and removed from the inferiormost trocar site. It was inspected on the back table, correlated with intra-operative findings, and passed off as specimen.     The right upper quadrant was then inspected. The cystic duct and cystic artery stumps were intact without bleeding or biliary leak. The right upper quadrant was irrigated with saline until clear. The abdomen was deflated and reinsufflated to make sure pneumoperitoneum was not tamponading any " bleeding and there was none.  The abdomen was again irrigated with saline until clear, and all trocars removed under direct and laparoscopic visualization. The fascia at the inferiormost incision  was closed using the previously placed 0 Vicryl suture. The wounds were irrigated with normal saline, and closed in each area using absorbable subcuticular suture. The incisions were dressed in standard sterile fashion and covered with dry dressings. The patient recovered from anesthesia, was extubated in the operating room, and transferred to the PACU in stable condition.  All sponge and needle counts were correct times two at the completion of the procedure. There were no immediate complications.     Artie Esparza MD  10/14/2020  13:44 EDT

## 2020-10-14 NOTE — ANESTHESIA POSTPROCEDURE EVALUATION
Patient: Andreina Brady Segovia    Procedure Summary     Date: 10/14/20 Room / Location:  [facility]    Anesthesia Start: 1248 Anesthesia Stop:     Procedure: CHOLECYSTECTOMY LAPAROSCOPIC INTRAOPERATIVE CHOLANGIOGRAM (N/A Abdomen) Diagnosis:     Surgeon: Artie Esparza MD Provider: David Hurst MD    Anesthesia Type: general ASA Status: 3          Anesthesia Type: general    Vitals  Vitals Value Taken Time   BP     Temp 97 °F (36.1 °C) 10/14/20 1355   Pulse 98 10/14/20 1355   Resp 20 10/14/20 1355   SpO2 95 % 10/14/20 1355           Post Anesthesia Care and Evaluation    Patient location during evaluation: PACU  Patient participation: complete - patient participated  Level of consciousness: awake and alert  Pain score: 0  Pain management: adequate  Airway patency: patent  Anesthetic complications: No anesthetic complications  PONV Status: none  Cardiovascular status: hemodynamically stable and acceptable  Respiratory status: nonlabored ventilation, acceptable and nasal cannula  Hydration status: acceptable

## 2020-10-14 NOTE — BRIEF OP NOTE
CHOLECYSTECTOMY LAPAROSCOPIC INTRAOPERATIVE CHOLANGIOGRAM  Progress Note    Andreina Segovia  10/14/2020    Pre-op Diagnosis:   Biliary dyskinesia       Post-Op Diagnosis Codes:   Same    Procedure/CPT® Codes:        Procedure(s):  CHOLECYSTECTOMY LAPAROSCOPIC INTRAOPERATIVE CHOLANGIOGRAM    Surgeon(s):  Artie Esparza MD    Anesthesia: General    Staff:   Circulator: Artie Berger RN; Tram Rojas RN  Radiology Technologist: Hernesto Conley RT  Scrub Person: Makeda Gaffney  Nursing Assistant: Gilma Valencia PCT         Estimated Blood Loss: minimal    Urine Voided: * No values recorded between 10/14/2020 12:45 PM and 10/14/2020  1:43 PM *    Specimens:                Specimens     ID Source Type Tests Collected By Collected At Frozen?      A Gallbladder Tissue · TISSUE PATHOLOGY EXAM   Artie Esparza MD 10/14/20 1338 No     Description: gallbladder and contents with speciman bag    This specimen was not marked as sent.                Drains: * No LDAs found *    Findings: IOC (-)    Complications: None          Artie Esparza MD     Date: 10/14/2020  Time: 13:43 EDT

## 2020-10-29 ENCOUNTER — TRANSCRIBE ORDERS (OUTPATIENT)
Dept: LAB | Facility: HOSPITAL | Age: 35
End: 2020-10-29

## 2020-10-29 ENCOUNTER — LAB (OUTPATIENT)
Dept: LAB | Facility: HOSPITAL | Age: 35
End: 2020-10-29

## 2020-10-29 DIAGNOSIS — Z00.00 ROUTINE GENERAL MEDICAL EXAMINATION AT A HEALTH CARE FACILITY: ICD-10-CM

## 2020-10-29 DIAGNOSIS — R50.9 FEVER, UNSPECIFIED FEVER CAUSE: ICD-10-CM

## 2020-10-29 DIAGNOSIS — R50.9 FEVER, UNSPECIFIED FEVER CAUSE: Primary | ICD-10-CM

## 2020-10-29 LAB
ALBUMIN SERPL-MCNC: 4.2 G/DL (ref 3.5–5.2)
ALBUMIN/GLOB SERPL: 1.4 G/DL
ALP SERPL-CCNC: 98 U/L (ref 39–117)
ALT SERPL W P-5'-P-CCNC: 17 U/L (ref 1–33)
ANION GAP SERPL CALCULATED.3IONS-SCNC: 11.3 MMOL/L (ref 5–15)
AST SERPL-CCNC: 18 U/L (ref 1–32)
BACTERIA UR QL AUTO: ABNORMAL /HPF
BASOPHILS # BLD AUTO: 0.05 10*3/MM3 (ref 0–0.2)
BASOPHILS NFR BLD AUTO: 0.7 % (ref 0–1.5)
BILIRUB SERPL-MCNC: 0.2 MG/DL (ref 0–1.2)
BILIRUB UR QL STRIP: NEGATIVE
BUN SERPL-MCNC: 11 MG/DL (ref 6–20)
BUN/CREAT SERPL: 14.5 (ref 7–25)
CALCIUM SPEC-SCNC: 9.6 MG/DL (ref 8.6–10.5)
CHLORIDE SERPL-SCNC: 104 MMOL/L (ref 98–107)
CLARITY UR: ABNORMAL
CO2 SERPL-SCNC: 24.7 MMOL/L (ref 22–29)
COD CRY URNS QL: ABNORMAL /HPF
COLOR UR: YELLOW
CREAT SERPL-MCNC: 0.76 MG/DL (ref 0.57–1)
DEPRECATED RDW RBC AUTO: 43.3 FL (ref 37–54)
EOSINOPHIL # BLD AUTO: 0.36 10*3/MM3 (ref 0–0.4)
EOSINOPHIL NFR BLD AUTO: 4.8 % (ref 0.3–6.2)
ERYTHROCYTE [DISTWIDTH] IN BLOOD BY AUTOMATED COUNT: 13.7 % (ref 12.3–15.4)
GFR SERPL CREATININE-BSD FRML MDRD: 86 ML/MIN/1.73
GLOBULIN UR ELPH-MCNC: 3.1 GM/DL
GLUCOSE SERPL-MCNC: 104 MG/DL (ref 65–99)
GLUCOSE UR STRIP-MCNC: NEGATIVE MG/DL
HCT VFR BLD AUTO: 39 % (ref 34–46.6)
HGB BLD-MCNC: 12.7 G/DL (ref 12–15.9)
HGB UR QL STRIP.AUTO: NEGATIVE
HYALINE CASTS UR QL AUTO: ABNORMAL /LPF
IMM GRANULOCYTES # BLD AUTO: 0.02 10*3/MM3 (ref 0–0.05)
IMM GRANULOCYTES NFR BLD AUTO: 0.3 % (ref 0–0.5)
KETONES UR QL STRIP: NEGATIVE
LEUKOCYTE ESTERASE UR QL STRIP.AUTO: ABNORMAL
LYMPHOCYTES # BLD AUTO: 2.15 10*3/MM3 (ref 0.7–3.1)
LYMPHOCYTES NFR BLD AUTO: 28.5 % (ref 19.6–45.3)
MCH RBC QN AUTO: 27.7 PG (ref 26.6–33)
MCHC RBC AUTO-ENTMCNC: 32.6 G/DL (ref 31.5–35.7)
MCV RBC AUTO: 85.2 FL (ref 79–97)
MONOCYTES # BLD AUTO: 0.43 10*3/MM3 (ref 0.1–0.9)
MONOCYTES NFR BLD AUTO: 5.7 % (ref 5–12)
NEUTROPHILS NFR BLD AUTO: 4.53 10*3/MM3 (ref 1.7–7)
NEUTROPHILS NFR BLD AUTO: 60 % (ref 42.7–76)
NITRITE UR QL STRIP: NEGATIVE
NRBC BLD AUTO-RTO: 0 /100 WBC (ref 0–0.2)
PH UR STRIP.AUTO: 5.5 [PH] (ref 5–8)
PLATELET # BLD AUTO: 291 10*3/MM3 (ref 140–450)
PMV BLD AUTO: 11.1 FL (ref 6–12)
POTASSIUM SERPL-SCNC: 4.3 MMOL/L (ref 3.5–5.2)
PROT SERPL-MCNC: 7.3 G/DL (ref 6–8.5)
PROT UR QL STRIP: NEGATIVE
RBC # BLD AUTO: 4.58 10*6/MM3 (ref 3.77–5.28)
RBC # UR: ABNORMAL /HPF
REF LAB TEST METHOD: ABNORMAL
SODIUM SERPL-SCNC: 140 MMOL/L (ref 136–145)
SP GR UR STRIP: 1.03 (ref 1–1.03)
SQUAMOUS #/AREA URNS HPF: ABNORMAL /HPF
UROBILINOGEN UR QL STRIP: ABNORMAL
WBC # BLD AUTO: 7.54 10*3/MM3 (ref 3.4–10.8)
WBC UR QL AUTO: ABNORMAL /HPF

## 2020-10-29 PROCEDURE — 81001 URINALYSIS AUTO W/SCOPE: CPT

## 2020-10-29 PROCEDURE — 80053 COMPREHEN METABOLIC PANEL: CPT

## 2020-10-29 PROCEDURE — 36415 COLL VENOUS BLD VENIPUNCTURE: CPT

## 2020-10-29 PROCEDURE — 85025 COMPLETE CBC W/AUTO DIFF WBC: CPT

## 2020-11-24 DIAGNOSIS — R10.11 POSTPRANDIAL RUQ PAIN: ICD-10-CM

## 2020-11-24 DIAGNOSIS — K21.9 CHRONIC GERD: ICD-10-CM

## 2020-11-24 DIAGNOSIS — R10.13 DYSPEPSIA: ICD-10-CM

## 2020-11-24 RX ORDER — PANTOPRAZOLE SODIUM 40 MG/1
40 TABLET, DELAYED RELEASE ORAL EVERY MORNING
Qty: 30 TABLET | Refills: 2 | Status: SHIPPED | OUTPATIENT
Start: 2020-11-24

## 2020-12-03 ENCOUNTER — LAB (OUTPATIENT)
Dept: LAB | Facility: HOSPITAL | Age: 35
End: 2020-12-03

## 2020-12-03 ENCOUNTER — TRANSCRIBE ORDERS (OUTPATIENT)
Dept: LAB | Facility: HOSPITAL | Age: 35
End: 2020-12-03

## 2020-12-03 DIAGNOSIS — R50.9 PERSISTENT FEVER: ICD-10-CM

## 2020-12-03 DIAGNOSIS — R50.9 HYPERTHERMIA-INDUCED DEFECT: Primary | ICD-10-CM

## 2020-12-03 DIAGNOSIS — R50.9 HYPERTHERMIA-INDUCED DEFECT: ICD-10-CM

## 2020-12-03 LAB
ALBUMIN SERPL-MCNC: 4.1 G/DL (ref 3.5–5.2)
ALBUMIN/GLOB SERPL: 1.2 G/DL
ALP SERPL-CCNC: 92 U/L (ref 39–117)
ALT SERPL W P-5'-P-CCNC: 21 U/L (ref 1–33)
ANION GAP SERPL CALCULATED.3IONS-SCNC: 8.6 MMOL/L (ref 5–15)
AST SERPL-CCNC: 18 U/L (ref 1–32)
BASOPHILS # BLD AUTO: 0.04 10*3/MM3 (ref 0–0.2)
BASOPHILS NFR BLD AUTO: 0.4 % (ref 0–1.5)
BILIRUB SERPL-MCNC: <0.2 MG/DL (ref 0–1.2)
BUN SERPL-MCNC: 11 MG/DL (ref 6–20)
BUN/CREAT SERPL: 15.3 (ref 7–25)
CALCIUM SPEC-SCNC: 9.6 MG/DL (ref 8.6–10.5)
CHLORIDE SERPL-SCNC: 106 MMOL/L (ref 98–107)
CO2 SERPL-SCNC: 24.4 MMOL/L (ref 22–29)
CREAT SERPL-MCNC: 0.72 MG/DL (ref 0.57–1)
CRP SERPL-MCNC: 1.81 MG/DL (ref 0–0.5)
DEPRECATED RDW RBC AUTO: 42.9 FL (ref 37–54)
EOSINOPHIL # BLD AUTO: 0.18 10*3/MM3 (ref 0–0.4)
EOSINOPHIL NFR BLD AUTO: 2 % (ref 0.3–6.2)
ERYTHROCYTE [DISTWIDTH] IN BLOOD BY AUTOMATED COUNT: 14.1 % (ref 12.3–15.4)
GFR SERPL CREATININE-BSD FRML MDRD: 91 ML/MIN/1.73
GLOBULIN UR ELPH-MCNC: 3.4 GM/DL
GLUCOSE SERPL-MCNC: 98 MG/DL (ref 65–99)
HCT VFR BLD AUTO: 37.4 % (ref 34–46.6)
HGB BLD-MCNC: 12.4 G/DL (ref 12–15.9)
IMM GRANULOCYTES # BLD AUTO: 0.05 10*3/MM3 (ref 0–0.05)
IMM GRANULOCYTES NFR BLD AUTO: 0.6 % (ref 0–0.5)
LYMPHOCYTES # BLD AUTO: 2.3 10*3/MM3 (ref 0.7–3.1)
LYMPHOCYTES NFR BLD AUTO: 25.9 % (ref 19.6–45.3)
MCH RBC QN AUTO: 27.8 PG (ref 26.6–33)
MCHC RBC AUTO-ENTMCNC: 33.2 G/DL (ref 31.5–35.7)
MCV RBC AUTO: 83.9 FL (ref 79–97)
MONOCYTES # BLD AUTO: 0.49 10*3/MM3 (ref 0.1–0.9)
MONOCYTES NFR BLD AUTO: 5.5 % (ref 5–12)
NEUTROPHILS NFR BLD AUTO: 5.83 10*3/MM3 (ref 1.7–7)
NEUTROPHILS NFR BLD AUTO: 65.6 % (ref 42.7–76)
NRBC BLD AUTO-RTO: 0 /100 WBC (ref 0–0.2)
PLATELET # BLD AUTO: 291 10*3/MM3 (ref 140–450)
PMV BLD AUTO: 11.1 FL (ref 6–12)
POTASSIUM SERPL-SCNC: 3.9 MMOL/L (ref 3.5–5.2)
PROT SERPL-MCNC: 7.5 G/DL (ref 6–8.5)
RBC # BLD AUTO: 4.46 10*6/MM3 (ref 3.77–5.28)
SODIUM SERPL-SCNC: 139 MMOL/L (ref 136–145)
WBC # BLD AUTO: 8.89 10*3/MM3 (ref 3.4–10.8)

## 2020-12-03 PROCEDURE — 36415 COLL VENOUS BLD VENIPUNCTURE: CPT

## 2020-12-03 PROCEDURE — 86140 C-REACTIVE PROTEIN: CPT

## 2020-12-03 PROCEDURE — 85025 COMPLETE CBC W/AUTO DIFF WBC: CPT

## 2020-12-03 PROCEDURE — 80053 COMPREHEN METABOLIC PANEL: CPT

## 2020-12-11 ENCOUNTER — HOSPITAL ENCOUNTER (OUTPATIENT)
Dept: CT IMAGING | Facility: HOSPITAL | Age: 35
Discharge: HOME | End: 2020-12-11
Admitting: INTERNAL MEDICINE

## 2020-12-11 DIAGNOSIS — R50.9 PERSISTENT FEVER: ICD-10-CM

## 2020-12-11 PROCEDURE — 25010000002 IOPAMIDOL 61 % SOLUTION: Performed by: INTERNAL MEDICINE

## 2020-12-11 PROCEDURE — 74177 CT ABD & PELVIS W/CONTRAST: CPT

## 2020-12-11 RX ADMIN — IOPAMIDOL 85 ML: 612 INJECTION, SOLUTION INTRAVENOUS at 13:28

## 2020-12-21 ENCOUNTER — IMMUNIZATION (OUTPATIENT)
Dept: VACCINE CLINIC | Facility: HOSPITAL | Age: 35
End: 2020-12-21

## 2020-12-21 PROCEDURE — 91300 HC SARSCOV02 VAC 30MCG/0.3ML IM: CPT | Performed by: PHYSICIAN ASSISTANT

## 2020-12-21 PROCEDURE — 0001A: CPT | Performed by: PHYSICIAN ASSISTANT

## 2021-01-11 ENCOUNTER — IMMUNIZATION (OUTPATIENT)
Dept: VACCINE CLINIC | Facility: HOSPITAL | Age: 36
End: 2021-01-11

## 2021-01-11 PROCEDURE — 0001A: CPT

## 2021-01-11 PROCEDURE — 0002A: CPT

## 2021-01-11 PROCEDURE — 91300 HC SARSCOV02 VAC 30MCG/0.3ML IM: CPT

## 2021-04-04 ENCOUNTER — HOSPITAL ENCOUNTER (EMERGENCY)
Facility: HOSPITAL | Age: 36
Discharge: HOME | End: 2021-04-04
Attending: EMERGENCY MEDICINE | Admitting: EMERGENCY MEDICINE

## 2021-04-04 ENCOUNTER — APPOINTMENT (OUTPATIENT)
Dept: GENERAL RADIOLOGY | Facility: HOSPITAL | Age: 36
End: 2021-04-04

## 2021-04-04 VITALS
HEIGHT: 59 IN | BODY MASS INDEX: 43.75 KG/M2 | WEIGHT: 217 LBS | RESPIRATION RATE: 16 BRPM | TEMPERATURE: 98.5 F | HEART RATE: 76 BPM | SYSTOLIC BLOOD PRESSURE: 127 MMHG | OXYGEN SATURATION: 98 % | DIASTOLIC BLOOD PRESSURE: 83 MMHG

## 2021-04-04 DIAGNOSIS — S83.92XA SPRAIN OF LEFT KNEE, UNSPECIFIED LIGAMENT, INITIAL ENCOUNTER: ICD-10-CM

## 2021-04-04 DIAGNOSIS — S80.02XA CONTUSION OF LEFT KNEE, INITIAL ENCOUNTER: Primary | ICD-10-CM
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
## 2021-04-04 PROCEDURE — 73560 X-RAY EXAM OF KNEE 1 OR 2: CPT

## 2021-04-04 PROCEDURE — 99283 EMERGENCY DEPT VISIT LOW MDM: CPT

## 2021-04-04 NOTE — ED PROVIDER NOTES
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  This occurred yesterday around 8 PM.  She tells me she had a small patient load and was able to do her whole shift.  She tells me it is worse when she takes the ice often as well as she starts to ambulate.  She tells me she does have a hypermobility disorder and she already follows Dr. Pedroza and actually has an appointment on Tuesday for her left knee.  She tells me her left knee pain is worse after taking the direct impact of her fall.  She is able to walk with pain.  She does tell me she has several days off after this to take care of her daughter.      Knee Pain  Location:  Knee  Time since incident:  12 hours  Injury: yes    Mechanism of injury: fall    Knee location:  L knee  Pain details:     Quality:  Aching    Radiates to:  Does not radiate    Severity:  Moderate    Onset quality:  Sudden    Timing:  Constant    Progression:  Unchanged  Chronicity:  New  Relieved by:  Ice  Worsened by:  Bearing weight and activity  Associated symptoms: decreased ROM and swelling    Associated symptoms: no fever, no neck pain and no numbness    Risk factors: no concern for non-accidental trauma        Review of Systems   Constitutional: Negative for chills, diaphoresis and fever.   HENT: Negative for congestion and sore throat.    Respiratory: Negative for cough, choking, chest tightness, shortness of breath and wheezing.    Cardiovascular: Negative for chest pain and leg swelling.   Gastrointestinal: Negative for abdominal distention, abdominal pain, anal bleeding, blood in stool, constipation, diarrhea, nausea and vomiting.   Genitourinary: Negative for difficulty urinating, dysuria, flank pain, frequency, hematuria and urgency.   Musculoskeletal: Negative for neck pain.   All other systems reviewed and are negative.      Past Medical History:   Diagnosis Date   • Bronchitis    • Dental crowns present   "   right side upper and lower - total of 3   • Gestational diabetes     Gestational diabetes with pregnancy, No medication   • Hypermobility syndrome    • Infectious mononucleosis    • Migraines    • Ovarian cyst    • Wears glasses        Allergies   Allergen Reactions   • Adhesive Tape Other (See Comments)     \"Takes my skin off\"; hives   • Clindamycin/Lincomycin Other (See Comments)     Pill esophagitis   • Naproxen Swelling   • Nsaids Angioedema       Past Surgical History:   Procedure Laterality Date   • CHOLECYSTECTOMY WITH INTRAOPERATIVE CHOLANGIOGRAM N/A 10/14/2020    Procedure: CHOLECYSTECTOMY LAPAROSCOPIC INTRAOPERATIVE CHOLANGIOGRAM;  Surgeon: Artie Esparza MD;  Location:  [facility];  Service: General;  Laterality: N/A;   • COLONOSCOPY     • DIAGNOSTIC LAPAROSCOPY N/A 1/4/2017    Procedure: DIAGNOSTIC LAPAROSCOPY, LAP BILATERAL SALPINGECTOMY;  Surgeon: Kaia Humphrey MD;  Location:  [facility];  Service:    • ENDOSCOPY N/A 3/21/2019    Procedure: EGD;  Surgeon: Wyatt Cool MD;  Location:  [facility];  Service: Gastroenterology   • SALPINGECTOMY     • WISDOM TOOTH EXTRACTION         Family History   Problem Relation Age of Onset   • Ulcerative colitis Mother    • Hypertension Mother    • Glaucoma Father    • Osteoarthritis Father    • Cancer Other    • Diabetes Other    • Heart attack Other    • Stroke Other        Social History     Socioeconomic History   • Marital status:      Spouse name: Not on file   • Number of children: Not on file   • Years of education: Not on file   • Highest education level: Not on file   Tobacco Use   • Smoking status: Never Smoker   • Smokeless tobacco: Never Used   Vaping Use   • Vaping Use: Never used   Substance and Sexual Activity   • Alcohol use: Yes     Comment: 2 per week   • Drug use: No   • Sexual activity: Never           Objective   Physical Exam  Constitutional:       Appearance: She is well-developed.   HENT:      Head: " Normocephalic and atraumatic.      Right Ear: External ear normal.      Left Ear: External ear normal.      Nose: Nose normal.   Eyes:      Conjunctiva/sclera: Conjunctivae normal.      Pupils: Pupils are equal, round, and reactive to light.   Cardiovascular:      Rate and Rhythm: Normal rate and regular rhythm.      Heart sounds: Normal heart sounds.   Pulmonary:      Effort: Pulmonary effort is normal.      Breath sounds: Normal breath sounds.   Abdominal:      General: Bowel sounds are normal.      Palpations: Abdomen is soft.   Musculoskeletal:         General: Normal range of motion.      Cervical back: Normal range of motion and neck supple.      Left lower leg: Swelling present.        Legs:       Comments: Limited range of motion related to pain.  Mild swelling.  Small area of bruising.  Good pulses.   Skin:     General: Skin is warm and dry.   Neurological:      Mental Status: She is alert and oriented to person, place, and time.   Psychiatric:         Behavior: Behavior normal.         Thought Content: Thought content normal.         Judgment: Judgment normal.         Procedures           ED Course  ED Course as of Apr 04 0907   Sun Apr 04, 2021   0836 Awaiting x-ray results.  Reassuringly patient has follow-up with her orthopedist on Tuesday.  She already has crutches at home.    [JM]   0905 Patient aware of x-ray findings.  I plan on a strapping her knee as well as recommending her following up with her orthopedist on Tuesday schedule.  Crutches as needed.    [JM]      ED Course User Index  [JM] Raghav Gooden, APRN           XR Knee 1 or 2 View Left   Preliminary Result   Slight hairline lucency superimposed on the mid patella, AP   view only. This is favored to be artifactual. No evidence of potential   acute trauma is seen elsewhere.                                                 MDM    Final diagnoses:   Contusion of left knee, initial encounter   Fall, initial encounter   Sprain of left knee,  unspecified ligament, initial encounter       ED Disposition  ED Disposition     ED Disposition Condition Comment    Discharge Stable           Deepti Ly MD  [address] [facility] [id number] [phone]      As needed    [facility] Emergency Department  [address] [address] [phone]    If symptoms worsen    Shiv Pedroza MD  [address] [facility] [id number] [phone]      as scheduled on Tuesday         Medication List      Changed    EPINEPHrine 0.3 MG/0.3ML solution auto-injector injection  Commonly known as: EPIPEN  Inject into outer thigh for severe allergic reaction. Call 911 after use. May substitute only with Mylan generic(NDC [address]-46)  What changed:   · when to take this  · reasons to take this     frovatriptan 2.5 MG tablet  Commonly known as: FROVA  Take 1 tablet by mouth at the onset of headache. If recurs, may repeat every 2 hours. Max of 3 tabs in 24 hours.  What changed:   · when to take this  · reasons to take this             Raghav Gooden, GISELLE  04/04/21 0907

## 2021-04-09 ENCOUNTER — OFFICE VISIT (OUTPATIENT)
Dept: ORTHOPEDIC SURGERY | Facility: CLINIC | Age: 36
End: 2021-04-09

## 2021-04-09 VITALS
WEIGHT: 217 LBS | HEART RATE: 83 BPM | BODY MASS INDEX: 43.75 KG/M2 | SYSTOLIC BLOOD PRESSURE: 147 MMHG | HEIGHT: 59 IN | DIASTOLIC BLOOD PRESSURE: 60 MMHG

## 2021-04-09 DIAGNOSIS — S80.02XA CONTUSION OF LEFT KNEE, INITIAL ENCOUNTER: Primary | ICD-10-CM

## 2021-04-09 PROCEDURE — 99213 OFFICE O/P EST LOW 20 MIN: CPT | Performed by: ORTHOPAEDIC SURGERY

## 2021-04-09 RX ORDER — ERGOCALCIFEROL 1.25 MG/1
CAPSULE ORAL
COMMUNITY

## 2021-04-09 NOTE — PROGRESS NOTES
Orthopaedic Clinic Note: Knee New Patient    Chief Complaint   Patient presents with   • Left Knee - Pain        HPI    Andreina Segovia is a 35 y.o. female who presents with left knee pain for 6 day(s). Onset mechanical fall directly onto anterior knee. Pain is localized to the anterior knee and is a 5/10 on the pain scale. Pain is described as burning and throbbing. Associated symptoms include pain and swelling. The pain is worse with walking, standing and sitting; resting make it better. Previous treatments have included: bracing and Tylenol since symptom onset. Although some transient relief was reported with these interventions, these conservative measures have failed and symptoms have persisted. The patient is limited in daily activities and has had a significant decrease in quality of life as a result. She denies fevers, chills, or constitutional symptoms.    I have reviewed the following portions of the patient's history:History of Present Illness    Past Medical History:   Diagnosis Date   • Bronchitis    • Dental crowns present     right side upper and lower - total of 3   • Gestational diabetes     Gestational diabetes with pregnancy, No medication   • Hypermobility syndrome    • Infectious mononucleosis    • Migraines    • Ovarian cyst    • Wears glasses       Past Surgical History:   Procedure Laterality Date   • CHOLECYSTECTOMY WITH INTRAOPERATIVE CHOLANGIOGRAM N/A 10/14/2020    Procedure: CHOLECYSTECTOMY LAPAROSCOPIC INTRAOPERATIVE CHOLANGIOGRAM;  Surgeon: Artie Esparza MD;  Location:  [facility];  Service: General;  Laterality: N/A;   • COLONOSCOPY     • DIAGNOSTIC LAPAROSCOPY N/A 1/4/2017    Procedure: DIAGNOSTIC LAPAROSCOPY, LAP BILATERAL SALPINGECTOMY;  Surgeon: Kaia Humphrey MD;  Location:  [facility];  Service:    • ENDOSCOPY N/A 3/21/2019    Procedure: EGD;  Surgeon: Wyatt Cool MD;  Location:  [facility];  Service: Gastroenterology   • SALPINGECTOMY     •  WISDOM TOOTH EXTRACTION        Family History   Problem Relation Age of Onset   • Ulcerative colitis Mother    • Hypertension Mother    • Glaucoma Father    • Osteoarthritis Father    • Cancer Other    • Diabetes Other    • Heart attack Other    • Stroke Other      Social History     Socioeconomic History   • Marital status:      Spouse name: Not on file   • Number of children: Not on file   • Years of education: Not on file   • Highest education level: Not on file   Tobacco Use   • Smoking status: Never Smoker   • Smokeless tobacco: Never Used   Vaping Use   • Vaping Use: Never used   Substance and Sexual Activity   • Alcohol use: Yes     Comment: 2 per week   • Drug use: No   • Sexual activity: Never      Current Outpatient Medications on File Prior to Visit   Medication Sig Dispense Refill   • Cholecalciferol (VITAMIN D) 50 MCG (2000 UT) tablet Take 2,000 Units by mouth Daily.     • diphenhydrAMINE (BENADRYL) 25 MG tablet Take 1 tablet by mouth Every 6 (Six) Hours As Needed for Itching. 30 tablet 0   • EPINEPHrine (EPIPEN) 0.3 MG/0.3ML solution auto-injector injection Inject into outer thigh for severe allergic reaction. Call 911 after use. May substitute only with Mylan generic(NDC [address]-43) (Patient taking differently: As Needed.) 2 each 2   • ergocalciferol (ERGOCALCIFEROL) 1.25 MG (77813 UT) capsule Vitamin D2 1,250 mcg (50,000 unit) capsule   Take by oral route.     • frovatriptan (FROVA) 2.5 MG tablet Take 1 tablet by mouth at the onset of headache. If recurs, may repeat every 2 hours. Max of 3 tabs in 24 hours. (Patient taking differently: Take 2.5 mg by mouth 1 (One) Time As Needed.) 9 tablet 2   • levocetirizine (XYZAL) 5 MG tablet Take 1 tablet by mouth Daily. 30 tablet 11   • melatonin 5 MG tablet tablet Take 5 mg by mouth At Night As Needed.     • olopatadine (PATANOL) 0.1 % ophthalmic solution Administer 1 drop to both eyes 2 (Two) Times a Day As Needed. 5 mL 6   • pantoprazole  "(PROTONIX) 40 MG EC tablet Take 1 tablet by mouth Every Morning. Take on an EMPTY STOMACH 30 min to 1hr BEFORE the first meal of the day 30 tablet 2   • Tuberculin-Allergy Syringes 28G X 1/2\" 1 ML misc Use as directed for allergy injection administration 10 each 6   • [DISCONTINUED] docusate sodium (COLACE) 250 MG capsule Take 1 capsule by mouth 2 (Two) Times a Day. 20 capsule 0   • [DISCONTINUED] methylPREDNISolone (MEDROL) 4 MG dose pack Take as directed per package 21 each 0   • [DISCONTINUED] oxyCODONE-acetaminophen (PERCOCET) 5-325 MG per tablet Take 1 tablet by mouth Every 4 (Four) Hours As Needed for pain. 17 tablet 0   • [DISCONTINUED] promethazine (PHENERGAN) 25 MG tablet Take 1 tablet by mouth Every 6 (Six) Hours As Needed for Nausea or Vomiting. 30 tablet 0     No current facility-administered medications on file prior to visit.      Allergies   Allergen Reactions   • Adhesive Tape Other (See Comments)     \"Takes my skin off\"; hives   • Clindamycin/Lincomycin Other (See Comments)     Pill esophagitis   • Naproxen Swelling   • Nsaids Angioedema        Review of Systems   Constitutional: Negative.    HENT: Negative.    Eyes: Negative.    Respiratory: Negative.    Cardiovascular: Negative.    Gastrointestinal: Negative.    Endocrine: Negative.    Genitourinary: Negative.    Musculoskeletal: Positive for arthralgias.   Skin: Negative.    Allergic/Immunologic: Negative.    Neurological: Negative.    Hematological: Negative.    Psychiatric/Behavioral: Negative.         The patient's Review of Systems was personally reviewed and confirmed as accurate.    The following portions of the patient's history were reviewed and updated as appropriate: allergies, current medications, past family history, past medical history, past social history, past surgical history and problem list.    Physical Exam  Blood pressure 147/60, pulse 83, height 149.9 cm (59\"), weight 98.4 kg (217 lb), not currently breastfeeding.    Body " mass index is 43.83 kg/m².    GENERAL APPEARANCE: awake, alert & oriented x 3, in no acute distress and well developed, well nourished  PSYCH: normal affect  LUNGS:  breathing nonlabored  EYES: sclera anicteric  CARDIOVASCULAR: palpable dorsalis pedis, palpable posterior tibial bilaterally. Capillary refill less than 2 seconds  EXTREMITIES: no clubbing, cyanosis  GAIT:  Normal            Right Lower Extremity Exam:   ----------  Hip Exam  ----------  FLEXION CONTRACTURE: None  FLEXION: 110 degrees  INTERNAL ROTATION: 20 degrees at 90 degrees of flexion   EXTERNAL ROTATION: 40 degrees at 90 degrees of flexion    PAIN WITH HIP MOTION: no  ----------  Knee Exam  ----------  ALIGNMENT: neutral, no varus or valgus deformity     RANGE OF MOTION:  Normal (0-120 degrees) with no extensor lag or flexion contracture  LIGAMENTOUS STABILITY:   stable to varus and valgus stress at terminal extension and 30 degrees without any evidence of laxity     STRENGTH:  5/5 knee flexion, extension. 5/5 ankle dorsiflexion and plantarflexion.     PAIN WITH PALPATION: denies tenderness to palpation about the knee, denies medial or lateral joint line pain  KNEE EFFUSION: no  PAIN WITH KNEE ROM: no  PATELLAR CREPITUS: yes, asymptomatic  SPECIAL EXAM FINDINGS:  Negative patellar compression    REFLEXES:  PATELLAR 2+/4  ACHILLES 2+/4    CLONUS: negative  STRAIGHT LEG TEST:   negative    SENSATION TO LIGHT TOUCH:  DEEP PERONEAL/SUPERFICIAL PERONEAL/SURAL/SAPHENOUS/TIBIAL:   intact    EDEMA:  no  ERYTHEMA:  no  WOUNDS/INCISIONS: no overlying skin problems.      Left Lower Extremity Exam:   ----------  Hip Exam  ----------  FLEXION CONTRACTURE: None  FLEXION: 110 degrees  INTERNAL ROTATION: 20 degrees at 90 degrees of flexion   EXTERNAL ROTATION: 40 degrees at 90 degrees of flexion    PAIN WITH HIP MOTION: no  ----------  Knee Exam  ----------  ALIGNMENT: neutral, no varus or valgus deformity     RANGE OF MOTION:  Normal (0-120 degrees) with no  extensor lag or flexion contracture  LIGAMENTOUS STABILITY:   stable to varus and valgus stress at terminal extension and 30 degrees without any evidence of laxity     STRENGTH:  5/5 knee flexion, extension. 5/5 ankle dorsiflexion and plantarflexion.     PAIN WITH PALPATION: Tender to palpation about tibial tubercle and proximal tibia, denies medial or lateral joint line pain  KNEE EFFUSION: no  PAIN WITH KNEE ROM: no  PATELLAR CREPITUS: no  SPECIAL EXAM FINDINGS:  Negative patellar compression    REFLEXES:  PATELLAR 2+/4  ACHILLES 2+/4    CLONUS: negative  STRAIGHT LEG TEST:   negative    SENSATION TO LIGHT TOUCH:  DEEP PERONEAL/SUPERFICIAL PERONEAL/SURAL/SAPHENOUS/TIBIAL:   intact    EDEMA:  no  ERYTHEMA:  no  WOUNDS/INCISIONS: no overlying skin problems.    ______________________________________________________________________  ______________________________________________________________________    RADIOGRAPHIC FINDINGS:   Left knee radiographs from 4/4/2021 were personally reviewed.  Radiographs demonstrate mild tricompartmental arthritic changes with neutral alignment.  No acute bony injury or fracture.    Assessment/Plan:   Diagnosis Plan   1. Contusion of left knee, initial encounter       Patient is suffering from a left knee contusion.  She is unable to take anti-inflammatory secondary to angioedema.  I discussed that conservative treatment with rest, ice, compressive wrap and elevation are recommended.  In addition to this, recommend over-the-counter Tylenol.  I explained that this contusion will likely take a couple of weeks to improve.  She is agreeable to conservative treatment plan.  She will follow-up as needed.    Artie Arellano MD  04/09/21  08:35 EDT

## 2021-04-09 NOTE — PROGRESS NOTES
"      [facility] [place] [facility]opa[facility] Surgery Clinic Note    Subjective     CC: Pain of the Left Knee      HPI    Andreina Segovia is a 35 y.o. female. ***     Review of Systems   Constitutional: Negative.  Negative for chills, fatigue and fever.   HENT: Negative.  Negative for congestion and dental problem.    Eyes: Negative.  Negative for blurred vision.   Respiratory: Negative.  Negative for shortness of breath.    Cardiovascular: Negative.  Negative for leg swelling.   Gastrointestinal: Negative.  Negative for abdominal pain.   Endocrine: Negative.  Negative for polyuria.   Genitourinary: Negative.  Negative for difficulty urinating.   Musculoskeletal: Positive for arthralgias.   Skin: Negative.    Allergic/Immunologic: Negative.    Neurological: Negative.    Hematological: Negative.  Negative for adenopathy.   Psychiatric/Behavioral: Negative.  Negative for behavioral problems.       ROS:    Constiutional:Pt denies fever, chills, nausea, or vomiting.  MSK:as above      Objective      Past Medical History  Past Medical History:   Diagnosis Date   • Bronchitis    • Dental crowns present     right side upper and lower - total of 3   • Gestational diabetes     Gestational diabetes with pregnancy, No medication   • Hypermobility syndrome    • Infectious mononucleosis    • Migraines    • Ovarian cyst    • Wears glasses          Physical Exam  Ht 149.9 cm (59\")   Wt 98.4 kg (217 lb)   BMI 43.83 kg/m²     Body mass index is 43.83 kg/m².    Patient is well nourished and well developed.        Ortho Exam  ***    Imaging/Labs/EMG Reviewed:  Imaging Results (Last 24 Hours)     ** No results found for the last 24 hours. **          Assessment:  No diagnosis found.    Plan:  1. Recommend over the counter anti-inflammatories for pain and/or swelling  2. ***    Follow Up:   No follow-ups on file.      Medical Decision Making  Management Options : {[facility] [id number]}        Gabriel David M.D., [facility]  Orthopedic Surgeon  Fellowship " [facility] Sports Medicine  [facility]  Orthopedics and Sports Medicine  [address], [address]  [place] [address]

## 2021-06-08 ENCOUNTER — DOCUMENTATION (OUTPATIENT)
Dept: PHYSICAL THERAPY | Facility: CLINIC | Age: 36
End: 2021-06-08

## 2021-06-08 DIAGNOSIS — M25.571 CHRONIC PAIN OF RIGHT ANKLE: Primary | ICD-10-CM

## 2021-06-08 DIAGNOSIS — G89.29 CHRONIC PAIN OF RIGHT ANKLE: Primary | ICD-10-CM

## 2021-06-08 DIAGNOSIS — R29.898 ANKLE WEAKNESS: ICD-10-CM

## 2021-06-08 NOTE — PROGRESS NOTES
Discharge Summary  Discharge Summary from Physical Therapy Report    Patient: Andreina Segovia   : 1985  Diagnosis/ICD-10 Code:  The primary encounter diagnosis was Chronic pain of right ankle. A diagnosis of Ankle weakness was also pertinent to this visit.   Referring practitioner: Silvana Wu MD  Date of Initial Visit: Type: THERAPY  Noted: 2020  Today's Date: 2021      Number of Visits: 12     Date of Discharge: 10/08/2020    Goals: Partially Met    Assessment/Reason for Discharge: feeling better, successful trial of independent HEP/self mgmt    Discharge Plan: Continue with current home exercise program as instructed            Collette Crespo, PT  Physical Therapist

## 2021-10-15 ENCOUNTER — IMMUNIZATION (OUTPATIENT)
Dept: VACCINE CLINIC | Facility: HOSPITAL | Age: 36
End: 2021-10-15

## 2021-10-15 PROCEDURE — 91300 HC SARSCOV02 VAC 30MCG/0.3ML IM: CPT | Performed by: INTERNAL MEDICINE

## 2021-10-15 PROCEDURE — 0004A ADM SARSCOV2 30MCG/0.3ML BOOSTER: CPT | Performed by: INTERNAL MEDICINE

## 2021-12-02 ENCOUNTER — LAB (OUTPATIENT)
Dept: LAB | Facility: HOSPITAL | Age: 36
End: 2021-12-02

## 2021-12-02 ENCOUNTER — TRANSCRIBE ORDERS (OUTPATIENT)
Dept: LAB | Facility: HOSPITAL | Age: 36
End: 2021-12-02

## 2021-12-02 DIAGNOSIS — Z00.00 ROUTINE GENERAL MEDICAL EXAMINATION AT A HEALTH CARE FACILITY: Primary | ICD-10-CM

## 2021-12-02 DIAGNOSIS — Z00.00 ROUTINE GENERAL MEDICAL EXAMINATION AT A HEALTH CARE FACILITY: ICD-10-CM

## 2021-12-02 LAB
ALBUMIN SERPL-MCNC: 4 G/DL (ref 3.5–5.2)
ALBUMIN/GLOB SERPL: 1.1 G/DL
ALP SERPL-CCNC: 99 U/L (ref 39–117)
ALT SERPL W P-5'-P-CCNC: 18 U/L (ref 1–33)
ANION GAP SERPL CALCULATED.3IONS-SCNC: 8.1 MMOL/L (ref 5–15)
AST SERPL-CCNC: 22 U/L (ref 1–32)
BILIRUB SERPL-MCNC: 0.3 MG/DL (ref 0–1.2)
BUN SERPL-MCNC: 12 MG/DL (ref 6–20)
BUN/CREAT SERPL: 12.2 (ref 7–25)
CALCIUM SPEC-SCNC: 9.9 MG/DL (ref 8.6–10.5)
CHLORIDE SERPL-SCNC: 104 MMOL/L (ref 98–107)
CHOLEST SERPL-MCNC: 187 MG/DL (ref 0–200)
CO2 SERPL-SCNC: 25.9 MMOL/L (ref 22–29)
CREAT SERPL-MCNC: 0.98 MG/DL (ref 0.57–1)
GFR SERPL CREATININE-BSD FRML MDRD: 64 ML/MIN/1.73
GLOBULIN UR ELPH-MCNC: 3.6 GM/DL
GLUCOSE SERPL-MCNC: 100 MG/DL (ref 65–99)
HDLC SERPL-MCNC: 58 MG/DL (ref 40–60)
LDLC SERPL CALC-MCNC: 114 MG/DL (ref 0–100)
LDLC/HDLC SERPL: 1.95 {RATIO}
POTASSIUM SERPL-SCNC: 4.1 MMOL/L (ref 3.5–5.2)
PROT SERPL-MCNC: 7.6 G/DL (ref 6–8.5)
SODIUM SERPL-SCNC: 138 MMOL/L (ref 136–145)
TRIGL SERPL-MCNC: 79 MG/DL (ref 0–150)
VLDLC SERPL-MCNC: 15 MG/DL (ref 5–40)

## 2021-12-02 PROCEDURE — 83036 HEMOGLOBIN GLYCOSYLATED A1C: CPT

## 2021-12-02 PROCEDURE — 80061 LIPID PANEL: CPT

## 2021-12-02 PROCEDURE — 80053 COMPREHEN METABOLIC PANEL: CPT

## 2021-12-02 PROCEDURE — 82306 VITAMIN D 25 HYDROXY: CPT

## 2021-12-02 PROCEDURE — 36415 COLL VENOUS BLD VENIPUNCTURE: CPT

## 2021-12-02 PROCEDURE — 84443 ASSAY THYROID STIM HORMONE: CPT

## 2021-12-03 LAB
25(OH)D3 SERPL-MCNC: 25.2 NG/ML (ref 30–100)
HBA1C MFR BLD: 5.7 % (ref 4.8–5.6)
TSH SERPL DL<=0.05 MIU/L-ACNC: 2.29 UIU/ML (ref 0.27–4.2)

## 2022-02-10 ENCOUNTER — TELEPHONE (OUTPATIENT)
Dept: GASTROENTEROLOGY | Facility: CLINIC | Age: 37
End: 2022-02-10

## 2022-02-10 NOTE — TELEPHONE ENCOUNTER
PT CALLED SHE IS HAVING TROUBLE SWOLLOWING PILLS AGAIN AND WANTS TO SET UP AN EDG , IS THAT OK WITH YOU?

## 2022-02-11 ENCOUNTER — PREP FOR SURGERY (OUTPATIENT)
Dept: OTHER | Facility: HOSPITAL | Age: 37
End: 2022-02-11

## 2022-02-11 DIAGNOSIS — Z01.818 PRE-OPERATIVE CLEARANCE: ICD-10-CM

## 2022-02-11 DIAGNOSIS — R13.10 DYSPHAGIA, UNSPECIFIED TYPE: Primary | ICD-10-CM

## 2022-02-25 ENCOUNTER — PRE-ADMISSION TESTING (OUTPATIENT)
Dept: PREADMISSION TESTING | Facility: HOSPITAL | Age: 37
End: 2022-02-25

## 2022-02-25 VITALS — HEIGHT: 59 IN | WEIGHT: 232.25 LBS | BODY MASS INDEX: 46.82 KG/M2

## 2022-02-25 LAB
DEPRECATED RDW RBC AUTO: 43.1 FL (ref 37–54)
ERYTHROCYTE [DISTWIDTH] IN BLOOD BY AUTOMATED COUNT: 13.1 % (ref 12.3–15.4)
HCT VFR BLD AUTO: 39.5 % (ref 34–46.6)
HGB BLD-MCNC: 13 G/DL (ref 12–15.9)
MCH RBC QN AUTO: 29.7 PG (ref 26.6–33)
MCHC RBC AUTO-ENTMCNC: 32.9 G/DL (ref 31.5–35.7)
MCV RBC AUTO: 90.2 FL (ref 79–97)
PLATELET # BLD AUTO: 230 10*3/MM3 (ref 140–450)
PMV BLD AUTO: 10.7 FL (ref 6–12)
RBC # BLD AUTO: 4.38 10*6/MM3 (ref 3.77–5.28)
WBC NRBC COR # BLD: 7.43 10*3/MM3 (ref 3.4–10.8)

## 2022-02-25 PROCEDURE — 36415 COLL VENOUS BLD VENIPUNCTURE: CPT

## 2022-02-25 PROCEDURE — 85027 COMPLETE CBC AUTOMATED: CPT

## 2022-02-27 ENCOUNTER — APPOINTMENT (OUTPATIENT)
Dept: PREADMISSION TESTING | Facility: HOSPITAL | Age: 37
End: 2022-02-27

## 2022-02-28 NOTE — TELEPHONE ENCOUNTER
PATIENT WAS SCHEDULED FOR EGD ON MARCH 1ST. PATIENT WENT TO PRE ADMISSION TESTING BUT DID NOT GO GET COVID AT [facility].     CALLED PATIENT TO SEE IF SHE WENT AND GOT RAPID SOMEWHERE. HAD TO [facility] FOR HER.

## 2022-03-01 ENCOUNTER — ANESTHESIA EVENT (OUTPATIENT)
Dept: GASTROENTEROLOGY | Facility: HOSPITAL | Age: 37
End: 2022-03-01

## 2022-03-01 ENCOUNTER — ANESTHESIA (OUTPATIENT)
Dept: GASTROENTEROLOGY | Facility: HOSPITAL | Age: 37
End: 2022-03-01

## 2022-03-01 ENCOUNTER — HOSPITAL ENCOUNTER (OUTPATIENT)
Facility: HOSPITAL | Age: 37
Setting detail: HOSPITAL OUTPATIENT SURGERY
Discharge: HOME | End: 2022-03-01
Attending: INTERNAL MEDICINE | Admitting: INTERNAL MEDICINE

## 2022-03-01 VITALS
SYSTOLIC BLOOD PRESSURE: 126 MMHG | TEMPERATURE: 98.6 F | RESPIRATION RATE: 18 BRPM | OXYGEN SATURATION: 100 % | HEART RATE: 78 BPM | DIASTOLIC BLOOD PRESSURE: 77 MMHG

## 2022-03-01 DIAGNOSIS — K21.9 GERD (GASTROESOPHAGEAL REFLUX DISEASE): ICD-10-CM

## 2022-03-01 DIAGNOSIS — R13.10 DYSPHAGIA, UNSPECIFIED TYPE: ICD-10-CM

## 2022-03-01 LAB
B-HCG UR QL: NEGATIVE
EXPIRATION DATE: NORMAL
INTERNAL NEGATIVE CONTROL: NORMAL
INTERNAL POSITIVE CONTROL: NORMAL
Lab: NORMAL

## 2022-03-01 PROCEDURE — 88305 TISSUE EXAM BY PATHOLOGIST: CPT | Performed by: INTERNAL MEDICINE

## 2022-03-01 PROCEDURE — 81025 URINE PREGNANCY TEST: CPT | Performed by: ANESTHESIOLOGY

## 2022-03-01 PROCEDURE — S0260 H&P FOR SURGERY: HCPCS | Performed by: INTERNAL MEDICINE

## 2022-03-01 PROCEDURE — 43239 EGD BIOPSY SINGLE/MULTIPLE: CPT | Performed by: INTERNAL MEDICINE

## 2022-03-01 PROCEDURE — 25010000002 PROPOFOL 10 MG/ML EMULSION: Performed by: NURSE ANESTHETIST, CERTIFIED REGISTERED

## 2022-03-01 PROCEDURE — 0 LIDOCAINE 1 % SOLUTION: Performed by: NURSE ANESTHETIST, CERTIFIED REGISTERED

## 2022-03-01 RX ORDER — FENTANYL CITRATE 50 UG/ML
50 INJECTION, SOLUTION INTRAMUSCULAR; INTRAVENOUS
Status: DISCONTINUED | OUTPATIENT
Start: 2022-03-01 | End: 2022-03-01 | Stop reason: HOSPADM

## 2022-03-01 RX ORDER — ONDANSETRON 2 MG/ML
4 INJECTION INTRAMUSCULAR; INTRAVENOUS ONCE AS NEEDED
Status: DISCONTINUED | OUTPATIENT
Start: 2022-03-01 | End: 2022-03-01 | Stop reason: HOSPADM

## 2022-03-01 RX ORDER — SODIUM CHLORIDE, SODIUM LACTATE, POTASSIUM CHLORIDE, AND CALCIUM CHLORIDE .6; .31; .03; .02 G/100ML; G/100ML; G/100ML; G/100ML
20 INJECTION, SOLUTION INTRAVENOUS CONTINUOUS
Status: DISCONTINUED | OUTPATIENT
Start: 2022-03-01 | End: 2022-03-01 | Stop reason: HOSPADM

## 2022-03-01 RX ORDER — PROPOFOL 10 MG/ML
VIAL (ML) INTRAVENOUS AS NEEDED
Status: DISCONTINUED | OUTPATIENT
Start: 2022-03-01 | End: 2022-03-01 | Stop reason: SURG

## 2022-03-01 RX ORDER — EPHEDRINE SULFATE 50 MG/ML
5 INJECTION, SOLUTION INTRAVENOUS ONCE AS NEEDED
Status: DISCONTINUED | OUTPATIENT
Start: 2022-03-01 | End: 2022-03-01 | Stop reason: HOSPADM

## 2022-03-01 RX ORDER — LIDOCAINE HYDROCHLORIDE 10 MG/ML
INJECTION, SOLUTION INFILTRATION; PERINEURAL AS NEEDED
Status: DISCONTINUED | OUTPATIENT
Start: 2022-03-01 | End: 2022-03-01 | Stop reason: SURG

## 2022-03-01 RX ADMIN — PROPOFOL 50 MG: 10 INJECTION, EMULSION INTRAVENOUS at 14:58

## 2022-03-01 RX ADMIN — PROPOFOL 50 MG: 10 INJECTION, EMULSION INTRAVENOUS at 15:01

## 2022-03-01 RX ADMIN — SODIUM CHLORIDE, POTASSIUM CHLORIDE, SODIUM LACTATE AND CALCIUM CHLORIDE 20 ML/HR: 600; 310; 30; 20 INJECTION, SOLUTION INTRAVENOUS at 14:01

## 2022-03-01 RX ADMIN — LIDOCAINE HYDROCHLORIDE 50 MG: 10 INJECTION, SOLUTION INFILTRATION; PERINEURAL at 15:01

## 2022-03-01 NOTE — H&P
HPI: Ms. Segovia is a 35 yo with a history of GERD. She did not have any medication for about 3-4 months. The patient denies any difficulty swallowing food but there are times she has issues with pills. The patient denies any abdominal pain. There is no issue with nausea or emesis. She has no blood in the stool or change in bowel habits.    PMH: GERD  PSH: Lap CCX             All: Naprosyn  Meds: see list  Fam Hx: Mother- ulcerative colitis  Soc Hx: No tobacco or alcohol    ROS: see HPI    Phy Exam: Gen- pleasant female, no acute distress                     HEENT- oropharynx clear, no lesions                     Chest- clear to auscultation                     Cardiac- s1s2                     Abd- soft, bowel sounds present, no tenderness                     Ext- no cyanosis, no clubbing                     Neuro- awake, alert    Imp: Gerd          Dysphagia- the differential includes motility disorder, pill esophagitis, Schatzki's ring and EOE.    Plan: Will proceed with EGD

## 2022-03-01 NOTE — ANESTHESIA POSTPROCEDURE EVALUATION
Called [facility], [phone] Ext [phone], Kayleigh Ring,  left msg to call clinic, may ask for Loretta @ [facility]. Need to discuss fax that was received per Orestes's note below. Ruth Behrens.    Patient: Andreina Brady [place]    Procedure Summary     Date: 03/01/22 Room / Location:  [facility] /  [facility]    Anesthesia Start: 1454 Anesthesia Stop: 1516    Procedure: ESOPHAGOGASTRODUODENOSCOPY (N/A ) Diagnosis:       Dysphagia, unspecified type      (Dysphagia, unspecified type [R13.10])    Surgeons: Wyatt Cool MD Provider: Erendira Frausto DO    Anesthesia Type: general, MAC ASA Status: 2          Anesthesia Type: general, MAC    Vitals  No vitals data found for the desired time range.          Post Anesthesia Care and Evaluation    Patient location during evaluation: PACU  Patient participation: complete - patient participated  Level of consciousness: awake and alert  Pain management: adequate  Airway patency: patent  Anesthetic complications: No anesthetic complications  PONV Status: none  Cardiovascular status: hemodynamically stable and acceptable  Respiratory status: nonlabored ventilation, acceptable and nasal cannula  Hydration status: acceptable

## 2022-03-01 NOTE — ANESTHESIA PREPROCEDURE EVALUATION
Anesthesia Evaluation     Patient summary reviewed and Nursing notes reviewed   NPO Solid Status: > 8 hours  NPO Liquid Status: > 8 hours           Airway   Mallampati: II  TM distance: >3 FB  Neck ROM: full  No difficulty expected  Dental      Pulmonary    (-) asthma, shortness of breath, recent URI, sleep apnea, not a smoker  Cardiovascular     ECG reviewed    (-) hypertension, past MI, dysrhythmias, angina, hyperlipidemia    ROS comment: ECG NSR 2020    Neuro/Psych  (-) seizures, CVA  GI/Hepatic/Renal/Endo    (+) obesity, morbid obesity, GERD,    (-) no renal disease, diabetes (gest only), no thyroid disorder    Musculoskeletal     Abdominal    Substance History      OB/GYN          Other                        Anesthesia Plan    ASA 2     general and MAC   (TOP POM PFL here for high BMI )  intravenous induction     Anesthetic plan, all risks, benefits, and alternatives have been provided, discussed and informed consent has been obtained with: patient.    Plan discussed with CRNA.        CODE STATUS:

## 2022-03-04 ENCOUNTER — TELEPHONE (OUTPATIENT)
Dept: GASTROENTEROLOGY | Facility: CLINIC | Age: 37
End: 2022-03-04

## 2022-03-04 NOTE — TELEPHONE ENCOUNTER
I called and spoke with Andreina regarding pathology.  She does believe the Protonix medication gives her some relief.  I do believe that the hiatus hernia certainly is related to reflux which can affect esophageal motility.  I discussed taking the medication on a daily basis.  I stated that if she begins to have symptoms while on medication a change in the regimen may be needed.

## 2022-06-08 ENCOUNTER — PATIENT MESSAGE (OUTPATIENT)
Dept: GASTROENTEROLOGY | Facility: CLINIC | Age: 37
End: 2022-06-08

## 2022-06-08 NOTE — TELEPHONE ENCOUNTER
From: Andreina Segovia  To: Wyatt Cool MD  Sent: 6/8/2022 2:52 AM EDT  Subject: Colonoscopy     I tried to find this in the notes from previous appointments, after my PCP asked... but when would I need another colonoscopy? I had one in 2016.    Andreina

## 2022-12-09 ENCOUNTER — OFFICE VISIT (OUTPATIENT)
Dept: ORTHOPEDIC SURGERY | Facility: CLINIC | Age: 37
End: 2022-12-09

## 2022-12-09 VITALS
BODY MASS INDEX: 46 KG/M2 | SYSTOLIC BLOOD PRESSURE: 128 MMHG | DIASTOLIC BLOOD PRESSURE: 82 MMHG | HEIGHT: 59 IN | WEIGHT: 228.2 LBS

## 2022-12-09 DIAGNOSIS — E66.01 CLASS 3 SEVERE OBESITY DUE TO EXCESS CALORIES WITHOUT SERIOUS COMORBIDITY WITH BODY MASS INDEX (BMI) OF 45.0 TO 49.9 IN ADULT: ICD-10-CM

## 2022-12-09 DIAGNOSIS — M72.2 PLANTAR FASCIITIS OF LEFT FOOT: Primary | ICD-10-CM

## 2022-12-09 PROCEDURE — 99213 OFFICE O/P EST LOW 20 MIN: CPT | Performed by: PHYSICIAN ASSISTANT

## 2022-12-09 NOTE — PROGRESS NOTES
[facility] [place] [facility] Note        Subjective     Pain of the Left Foot    Answers for HPI/ROS submitted by the patient on 12/8/2022  Please describe your symptoms.: Left foot pain, no injury to cause it  Have you had these symptoms before?: No  How long have you been having these symptoms?: Greater than 2 weeks  Please describe any probable cause for these symptoms. : Having a job where I'm on my feet a lot  What is the primary reason for your visit?: Other        HPI    Andreina Segovia is a 37 y.o. female.  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  No trauma no injury.  Bothering her for about a month.  Bothers her with weightbearing and walking worse when getting from a seated position first thing in the morning.  She reports some burning pain as well.  She has treated with a night splint, Tylenol and icing.    Past Medical History:   Diagnosis Date   • Ankle sprain    • Bronchitis    • Dental crowns present     right side upper and lower - total of 3   • Hypermobility syndrome    • Infectious mononucleosis    • Knee sprain    • Migraines    • Ovarian cyst    • Subluxation of patella    • Tendinitis of knee    • Tennis elbow    • Wears glasses       Past Surgical History:   Procedure Laterality Date   • CHOLECYSTECTOMY WITH INTRAOPERATIVE CHOLANGIOGRAM N/A 10/14/2020    Procedure: CHOLECYSTECTOMY LAPAROSCOPIC INTRAOPERATIVE CHOLANGIOGRAM;  Surgeon: Artie Esparza MD;  Location: [facility] OR;  Service: General;  Laterality: N/A;   • COLONOSCOPY     • DIAGNOSTIC LAPAROSCOPY N/A 01/04/2017    Procedure: DIAGNOSTIC LAPAROSCOPY, LAP BILATERAL SALPINGECTOMY;  Surgeon: Kaia Humphrey MD;  Location:  [facility];  Service:    • ENDOSCOPY N/A 03/21/2019    Procedure: EGD;  Surgeon: Wyatt Cool MD;  Location: [facility] ENDOSCOPY;  Service: Gastroenterology   • ENDOSCOPY N/A 03/01/2022    Procedure: ESOPHAGOGASTRODUODENOSCOPY;  Surgeon:  Wyatt Cool MD;  Location: [facility] ENDOSCOPY;  Service: Gastroenterology;  Laterality: N/A;   • SALPINGECTOMY     • WISDOM TOOTH EXTRACTION        Family History   Problem Relation Age of Onset   • Ulcerative colitis Mother    • Hypertension Mother    • Glaucoma Father    • Osteoarthritis Father    • Cancer Other    • Diabetes Other    • Heart attack Other    • Stroke Other    • Diabetes Paternal Grandmother      Social History     Socioeconomic History   • Marital status:    Tobacco Use   • Smoking status: Never   • Smokeless tobacco: Never   Vaping Use   • Vaping Use: Never used   Substance and Sexual Activity   • Alcohol use: Yes     Alcohol/week: 1.0 - 2.0 standard drink     Types: 1 - 2 Drinks containing 0.5 oz of alcohol per week     Comment: 2 per week   • Drug use: No   • Sexual activity: Never      Current Outpatient Medications on File Prior to Visit   Medication Sig Dispense Refill   • busPIRone (BUSPAR) 5 MG tablet Take 1 tablet by mouth 2 (Two) Times a Day. 60 tablet 11   • Cholecalciferol (VITAMIN D) 50 MCG (2000 UT) tablet Take 2,000 Units by mouth Daily.     • diphenhydrAMINE (BENADRYL) 25 MG tablet Take 1 tablet by mouth Every 6 (Six) Hours As Needed for Itching. 30 tablet 0   • EPINEPHrine (EPIPEN) 0.3 MG/0.3ML solution auto-injector injection Inject into outer thigh for severe allergic reaction. Call 911 after use. May substitute only with M[employer] generic(NDC [address]-88) (Patient taking differently: As Needed.) 2 each 2   • ergocalciferol (ERGOCALCIFEROL) 1.25 MG (13019 UT) capsule Vitamin D2 1,250 mcg (50,000 unit) capsule   Take by oral route.     • fluticasone (FLONASE) 50 MCG/ACT nasal spray Instill 2 sprays into the nostril(s) as directed by provider Daily As Needed. 16 g 11   • frovatriptan (FROVA) 2.5 MG tablet Take 1 tablet by mouth at the onset of headache. If recurs, may repeat every 2 hours. Max of 3 tabs in 24 hours. (Patient taking differently: Take 2.5 mg by mouth 1  "(One) Time As Needed.) 9 tablet 2   • levocetirizine (XYZAL) 5 MG tablet Take 1 tablet by mouth Daily. 30 tablet 11   • lisdexamfetamine (Vyvanse) 30 MG capsule Take 1 capsule by mouth every morning 30 capsule 0   • medroxyPROGESTERone (Provera) 10 MG tablet Take 1 tablet by mouth Every Night for 14 nights. Do this once every 4 months if no period. 14 tablet 4   • melatonin 5 MG tablet tablet Take 5 mg by mouth At Night As Needed.     • olopatadine (PATANOL) 0.1 % ophthalmic solution Administer 1 drop to both eyes 2 (Two) Times a Day As Needed. 5 mL 6   • pantoprazole (PROTONIX) 40 MG EC tablet Take 1 tablet by mouth Every Morning. Take on an EMPTY STOMACH 30 min to 1hr BEFORE the first meal of the day 30 tablet 2   • pantoprazole (PROTONIX) 40 MG EC tablet Take 1 tablet by mouth Every Morning Before Breakfast. 90 tablet 3   • fluticasone (FLONASE) 50 MCG/ACT nasal spray 2 sprays into each nostril Daily for 30 days. 1 bottle 0   • frovatriptan (FROVA) 2.5 MG tablet Take 1 tablet (2.5 mg total) by mouth 1 (one) time if needed for migraine 10 tablet 0     No current facility-administered medications on file prior to visit.      Allergies   Allergen Reactions   • Naproxen Swelling   • Nsaids Angioedema   • Adhesive Tape Other (See Comments)     \"Takes my skin off\"; hives   • Clindamycin/Lincomycin Other (See Comments)     Pill esophagitis          Review of Systems   Constitutional: Negative.    HENT: Negative.    Eyes: Negative.    Respiratory: Negative.    Cardiovascular: Negative.    Gastrointestinal: Negative.    Endocrine: Negative.    Genitourinary: Negative.    Musculoskeletal: Positive for arthralgias.   Skin: Negative.    Allergic/Immunologic: Negative.    Neurological: Negative.    Hematological: Negative.    Psychiatric/Behavioral: Negative.         I reviewed the patient's chief complaint, history of present illness, review of systems, past medical history, surgical history, family history, social history, " "medications and allergy list.        Objective      Physical Exam  /82   Ht 149.9 cm (59.02\")   Wt 104 kg (228 lb 3.2 oz)   BMI 46.07 kg/m²     Body mass index is 46.07 kg/m².    General  Mental Status - alert  General Appearance - cooperative, well groomed, not in acute distress  Orientation - Oriented X3  Build & Nutrition - well developed and well nourished  Posture - normal posture  Gait -mildly antalgic first few steps       Ortho Exam  Left foot exam: Tender at the origin of plantar fascia.  No swelling.  Remainder the foot and ankle nontender.  Neurovascular intact distally.  5/5 motor strength.  Able to walk on toes painful to walk on her heels.  Pulses 2+    Imaging/Studies  Imaging Results (Last 24 Hours)     ** No results found for the last 24 hours. **            Assessment    Assessment:  1. Plantar fasciitis of left foot    2. Class 3 severe obesity due to excess calories without serious comorbidity with body mass index (BMI) of 45.0 to 49.9 in adult (HCC)          Plan:  1. Recommend over-the-counter medication as needed for discomfort  2.  Left plantar fasciitis:  I explained plantar fasciitis in detail to the patient.  I explained to the bow-string mechanism of the plantar fascia.  I went over the current research of the [employer] Society with them.  I explained the treatments that were not statistically significant in improving the problem including: injection of steroids, special orthotics, special shoes, surgery, medication, ice, not working, etc.    I explained the treatments that are statistically significant at improving the problem.  These include stretching/night splinting, casting, PRP.    I explained the most important treatment: Stretching and night splinting.  I went over the patient information sheet with them, I showed them the stretches and they were able to reproduce them.  I emphasized the \"toe pull\" as the most important stretch.  They need to do " the stretches 5 repetitions each, 6-8 times per day.  I explained how to do them at work, at home, before getting out of bed, before getting out of the car, and before getting up from a chair.    We provided them with a night splint.    If they do not improve after 4 months of aggressive stretching and night splinting, the next step will be a short leg fiberglass walking cast worn for 6 weeks.    · Preprinted education was provided to the patient.    Patient begin stretching and night splinting return for casting if needed  3. Patient has a BMI of 46.07  The patient has been instructed on weight loss avenues including diet, portion control, calorie restriction, low/no impact exercise, referral to weight loss management and/or bariatric surgery.  It was explained that weight loss can improve joint pain alone by decreasing the joint reaction forces.  For every pound of weight change, the knee and hip joints see a 4 to 5 fold change in pressure.          .          Marta Carrington PA-C  12/14/22  11:41 EST

## 2023-01-10 ENCOUNTER — TELEPHONE (OUTPATIENT)
Dept: ORTHOPEDIC SURGERY | Facility: CLINIC | Age: 38
End: 2023-01-10
Payer: COMMERCIAL

## 2023-01-10 NOTE — TELEPHONE ENCOUNTER
----- Message from Andreina Segovia sent at 1/9/2023  9:12 PM EST -----  Regarding: plantar fasciitis  Contact: [phone]  I have been wearing the splint as much as possible (if it bugs me during the night, I'll wear it during the day when I'm sitting around the house when I'm not working). For the most part, the pain has been okay outside of work. However, when I'm at work the pain has gotten so bad, I had to wear my boot from when I had tendonitis two years ago just so I could go back to work tonight. I could barely put weight on my foot. I'm not sure if there is anything else I could try. I have been doing the stretches, but I don't feel like I can get a good stretch with my hypermobility.

## 2023-01-10 NOTE — TELEPHONE ENCOUNTER
The next step would be to place a weightbearing cast for 6 weeks.  I am happy to see her and reevaluate and discuss further treatment

## 2023-01-11 ENCOUNTER — OFFICE VISIT (OUTPATIENT)
Dept: ORTHOPEDIC SURGERY | Facility: CLINIC | Age: 38
End: 2023-01-11
Payer: COMMERCIAL

## 2023-01-11 VITALS
SYSTOLIC BLOOD PRESSURE: 128 MMHG | WEIGHT: 230 LBS | HEIGHT: 59 IN | BODY MASS INDEX: 46.37 KG/M2 | DIASTOLIC BLOOD PRESSURE: 86 MMHG

## 2023-01-11 DIAGNOSIS — M72.2 PLANTAR FASCIITIS OF LEFT FOOT: Primary | ICD-10-CM

## 2023-01-11 PROCEDURE — 99212 OFFICE O/P EST SF 10 MIN: CPT | Performed by: PHYSICIAN ASSISTANT

## 2023-01-11 NOTE — PROGRESS NOTES
"        [facility] [place] [facility] Note        Subjective     CC: Follow-up (4 week f/u; Plantar fasciitis of left foot )      HPI    Andreina Segovia is a 37 y.o. female.  Patient returns today for left Planter fasciitis.  She continues to have pain and dysfunction despite stretching and night splinting.  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  Here for further evaluation.    Overall, patient's symptoms are the same    ROS:    Constiutional:Pt denies fever, chills, nausea, or vomiting.  MSK:as above        Objective      Past Medical History  Past Medical History:   Diagnosis Date   • Ankle sprain    • Bronchitis    • Dental crowns present     right side upper and lower - total of 3   • Hypermobility syndrome    • Infectious mononucleosis    • Knee sprain    • Migraines    • Ovarian cyst    • Subluxation of patella    • Tendinitis of knee    • Tennis elbow    • Wears glasses          Physical Exam  /86   Ht 149.9 cm (59.02\")   Wt 104 kg (230 lb)   BMI 46.43 kg/m²     Body mass index is 46.43 kg/m².    Patient is well nourished and well developed.        Ortho Exam  Left foot exam: tender over the origin of the plantar fascia. NVI. Pulses 2+    Imaging/Labs/EMG Reviewed:  Imaging Results (Last 24 Hours)     ** No results found for the last 24 hours. **            Assessment    Assessment:  1. Plantar fasciitis of left foot        Plan:  1. Recommend over the counter anti-inflammatories for pain and/or swelling  2. Left PF.  Plan today is to play the patient is a SL fiberglass WB cast.  She understand she will not be 100% improved after casting and then will return to stretching and night splinting.  RTC 6 weeks for exam out of the cast.       Marta Carrington PA-C  01/11/23  14:55 EST      "

## 2023-02-17 ENCOUNTER — TELEPHONE (OUTPATIENT)
Dept: ORTHOPEDIC SURGERY | Facility: CLINIC | Age: 38
End: 2023-02-17
Payer: COMMERCIAL

## 2023-02-17 NOTE — TELEPHONE ENCOUNTER
Patient called reporting she got her cast wet and wanted to know if it could be changed; Pt came in to the office to have cast removed and possibly replaced; Upon removal, skin was intact and no evidence of any skin breakdown or maceration present; Patient was placed back into a WB SLC and will return to see Marta as scheduled.    Hugh FREEMAN CMA ([facility]), ROT

## 2023-02-22 ENCOUNTER — OFFICE VISIT (OUTPATIENT)
Dept: ORTHOPEDIC SURGERY | Facility: CLINIC | Age: 38
End: 2023-02-22
Payer: COMMERCIAL

## 2023-02-22 VITALS
WEIGHT: 232 LBS | SYSTOLIC BLOOD PRESSURE: 141 MMHG | BODY MASS INDEX: 46.77 KG/M2 | DIASTOLIC BLOOD PRESSURE: 85 MMHG | HEIGHT: 59 IN

## 2023-02-22 DIAGNOSIS — M72.2 PLANTAR FASCIITIS OF LEFT FOOT: Primary | ICD-10-CM

## 2023-02-22 PROCEDURE — 99212 OFFICE O/P EST SF 10 MIN: CPT | Performed by: PHYSICIAN ASSISTANT

## 2023-02-22 NOTE — PROGRESS NOTES
"        [facility] Note        Subjective     CC: Follow-up (Plantar fasciitis of left foot f/u remove cast-last cast replacement 2/17/23/ /)      HPI    Andreina Segovia is a 37 y.o. female.  Patient returns today for her left Planter fasciitis.  She has been casted for 6 weeks.  She reports some improvement in pain.  No new symptoms.    Overall, patient's symptoms are improved    ROS:    Constiutional:Pt denies fever, chills, nausea, or vomiting.  MSK:as above        Objective      Past Medical History  Past Medical History:   Diagnosis Date   • Ankle sprain    • Bronchitis    • Dental crowns present     right side upper and lower - total of 3   • Hypermobility syndrome    • Infectious mononucleosis    • Knee sprain    • Migraines    • Ovarian cyst    • Subluxation of patella    • Tendinitis of knee    • Tennis elbow    • Wears glasses          Physical Exam  /85   Ht 149.9 cm (59.02\")   Wt 105 kg (232 lb)   BMI 46.83 kg/m²     Body mass index is 46.83 kg/m².    Patient is well nourished and well developed.        Ortho Exam  Left foot exam: Nontender palpation.  Normal range of motion.  Neurovascular intact distally.    Imaging/Labs/EMG Reviewed:  Imaging Results (Last 24 Hours)     ** No results found for the last 24 hours. **            Assessment    Assessment:  1. Plantar fasciitis of left foot        Plan:  1. Recommend over the counter anti-inflammatories for pain and/or swelling  2. Left Planter fasciitis.  Patient will return to stretching and night splinting.  She works 12-hour shifts and will wean herself from her boot to regular shoe at work.  She will return to see us as needed.      Marta Carrington PA-C  02/22/23  08:54 EST      "

## 2023-03-15 ENCOUNTER — TELEPHONE (OUTPATIENT)
Dept: URGENT CARE | Facility: CLINIC | Age: 38
End: 2023-03-15
Payer: COMMERCIAL

## 2023-08-04 ENCOUNTER — HOSPITAL ENCOUNTER (OUTPATIENT)
Dept: MRI IMAGING | Facility: HOSPITAL | Age: 38
Discharge: HOME | End: 2023-08-04
Admitting: PHYSICIAN ASSISTANT
Payer: COMMERCIAL

## 2023-08-04 DIAGNOSIS — M54.12 RADICULOPATHY, CERVICAL REGION: ICD-10-CM

## 2023-08-04 PROCEDURE — 72141 MRI NECK SPINE W/O DYE: CPT

## 2023-09-06 ENCOUNTER — TELEPHONE (OUTPATIENT)
Dept: ORTHOPEDIC SURGERY | Facility: CLINIC | Age: 38
End: 2023-09-06
Payer: COMMERCIAL

## 2023-09-06 NOTE — TELEPHONE ENCOUNTER
Provider: DR WILSON   Caller: TIMUR   Relationship to Patient: PRESLEY FLAHERTY    Phone Number: [phone]  Reason for Call: TIMUR STATED THE PATIENT IS TELLING HER THAT SHE JUST HAD SX AND WILL BE GOING TO PHYSICAL THERAPY. TIMUR IS NEEDING THE PHYSICAL THERAPY ORDERS SENT OVER TO HER   When was the patient last seen: THE LAST VISIT I AM SEEING IS 2/22/23 WITH KIRTI HICKS. NO RECENT SX. AND NO PT ORDERS.    [phone]    TIMUR IS REQUESTING A CALL BACK  PLEASE ADVISE

## 2023-09-25 ENCOUNTER — TRANSCRIBE ORDERS (OUTPATIENT)
Dept: ADMINISTRATIVE | Facility: HOSPITAL | Age: 38
End: 2023-09-25

## 2023-09-25 DIAGNOSIS — M75.21 BICIPITAL TENDINITIS OF RIGHT SHOULDER: Primary | ICD-10-CM

## 2023-09-26 ENCOUNTER — TRANSCRIBE ORDERS (OUTPATIENT)
Dept: ADMINISTRATIVE | Facility: HOSPITAL | Age: 38
End: 2023-09-26
Payer: COMMERCIAL

## 2023-09-26 DIAGNOSIS — M75.21 BICIPITAL TENDINITIS OF RIGHT SHOULDER: Primary | ICD-10-CM

## 2023-09-28 ENCOUNTER — LAB (OUTPATIENT)
Dept: LAB | Facility: HOSPITAL | Age: 38
End: 2023-09-28
Payer: COMMERCIAL

## 2023-09-28 DIAGNOSIS — D51.8 OTHER VITAMIN B12 DEFICIENCY ANEMIA: Primary | ICD-10-CM

## 2023-09-28 LAB
ANION GAP SERPL CALCULATED.3IONS-SCNC: 8 MMOL/L (ref 5–15)
BUN SERPL-MCNC: 9 MG/DL (ref 6–20)
BUN/CREAT SERPL: 11 (ref 7–25)
CALCIUM SPEC-SCNC: 9.4 MG/DL (ref 8.6–10.5)
CHLORIDE SERPL-SCNC: 104 MMOL/L (ref 98–107)
CHOLEST SERPL-MCNC: 166 MG/DL (ref 0–200)
CO2 SERPL-SCNC: 26 MMOL/L (ref 22–29)
CREAT SERPL-MCNC: 0.82 MG/DL (ref 0.57–1)
EGFRCR SERPLBLD CKD-EPI 2021: 92.9 ML/MIN/1.73
GLUCOSE SERPL-MCNC: 92 MG/DL (ref 65–99)
HBA1C MFR BLD: 5.6 % (ref 4.8–5.6)
HDLC SERPL-MCNC: 56 MG/DL (ref 40–60)
LDLC SERPL CALC-MCNC: 89 MG/DL (ref 0–100)
LDLC/HDLC SERPL: 1.55 {RATIO}
POTASSIUM SERPL-SCNC: 3.9 MMOL/L (ref 3.5–5.2)
SODIUM SERPL-SCNC: 138 MMOL/L (ref 136–145)
TRIGL SERPL-MCNC: 116 MG/DL (ref 0–150)
VIT B12 BLD-MCNC: 276 PG/ML (ref 211–946)
VLDLC SERPL-MCNC: 21 MG/DL (ref 5–40)

## 2023-09-28 PROCEDURE — 82607 VITAMIN B-12: CPT

## 2023-09-28 PROCEDURE — 80048 BASIC METABOLIC PNL TOTAL CA: CPT

## 2023-09-28 PROCEDURE — 83036 HEMOGLOBIN GLYCOSYLATED A1C: CPT

## 2023-09-28 PROCEDURE — 36415 COLL VENOUS BLD VENIPUNCTURE: CPT

## 2023-09-28 PROCEDURE — 80061 LIPID PANEL: CPT

## 2023-10-06 ENCOUNTER — TREATMENT (OUTPATIENT)
Dept: PHYSICAL THERAPY | Facility: CLINIC | Age: 38
End: 2023-10-06
Payer: COMMERCIAL

## 2023-10-06 DIAGNOSIS — G56.21 ULNAR NERVE COMPRESSION AT MULTIPLE LEVELS, RIGHT: Primary | ICD-10-CM

## 2023-10-06 PROCEDURE — 97162 PT EVAL MOD COMPLEX 30 MIN: CPT | Performed by: PHYSICAL THERAPIST

## 2023-10-06 PROCEDURE — 97530 THERAPEUTIC ACTIVITIES: CPT | Performed by: PHYSICAL THERAPIST

## 2023-10-06 NOTE — PROGRESS NOTES
Physical Therapy Initial Evaluation and Plan of Care      Patient: Andreina Segovia   : 1985  Diagnosis/ICD-10 Code:  Ulnar nerve compression at multiple levels, right [G56.21]  Referring practitioner: Drake Granda MD    Subjective Evaluation    History of Present Illness  Mechanism of injury: Started having numbness/tingling in March in last two fingers and it progressed to elbow pain and shoulder. Had MRI which was clear at neck and EMG was neg.   Had unrelated work injury and this was on hold.     N/T increased in morning, with driving.     Had steroid injection Tuesday and has started wearing a wrist brace.     Has MRI scheduled for shoulder.           Patient Occupation: nurse (currently off work/light duty) Pain  Pain scale: shoulder 4/10.  At worst pain ratin  Location: right shuolder burning, elbow sharp  Quality: dull ache, sharp and burning  Aggravating factors: lifting, movement, sleeping, repetitive movement, overhead activity and outstretched reach    Hand dominance: right    Patient Goals  Patient goals for therapy: decreased edema, decreased pain, increased motion, return to sport/leisure activities, independence with ADLs/IADLs and increased strength             Objective          Active Range of Motion     Left Elbow   Extension: 5 degrees     Right Elbow   Extension: 5 degrees     Left Wrist   Wrist flexion: 55 degrees   Wrist extension: 75 degrees   Radial deviation: 32 degrees   Ulnar deviation: 45 degrees     Right Wrist   Wrist flexion: 70 degrees   Wrist extension: 75 degrees   Radial deviation: 28 degrees   Ulnar deviation: 42 degrees     Strength/Myotome Testing     Left Wrist/Hand   Normal wrist strength     (2nd hand position)   Left  strength (2nd hand position) 65 lbs    Right Wrist/Hand   Wrist extension: 5  Wrist flexion: 4+  Radial deviation: 5  Ulnar deviation: 5     (2nd hand position)   Right  strength (2nd hand position) 55  lbs    Additional Strength Details  Normal strength of finger abd/add.     Tests   Cervical     Left   Negative active compression ([place]) and Spurling's sign.     Right   Positive ULTT4.   Negative active compression ([place]) and Spurling's sign.     Right Elbow   Positive Tinel's sign (cubital tunnel).     Right Wrist/Hand   Negative Froment's sign.     Additional Tests Details  Ulnar compression (+) at all compression sites along arm.           Assessment & Plan       Assessment  Impairments: abnormal coordination, abnormal muscle firing, abnormal muscle tone, abnormal or restricted ROM, activity intolerance, impaired physical strength, lacks appropriate home exercise program and pain with function   Functional limitations: carrying objects, lifting, sleeping, pulling, pushing, uncomfortable because of pain and unable to perform repetitive tasks   Assessment details: Patient is a 38 year old female presenting with right intermittent numbness of 4th and 5th digits as well as pain in elbow and shoulder. Signs and symptoms are consistent with ulnar nerve compression at multiple sites throughout arm. Negative testing and negative MRI at cervical spine and she has an MRI scheduled for right shoulder. She demonstrates general neural tension without sign of weakness in ulnar nerve supplied muscles. She is appropriate for physical therapy to address this.   Prognosis: fair  Prognosis details: Short Term Goals (3 weeks):  1. Patient will be independent with home exercise program.  2. Patient will demonstrate improved shoulder strength by 50%.  3. Patient will demonstrate improved right  strength to at least equal to non-dominant hand.     Long Term Goals (8 weeks):  1. Patient will be able to lift at least 5 lbs overhead with shoulder pain no greater than 2/10.   2. Patient will report no numbness of 4th and 5th digits over a weeks time.   3. Patient will be able to return to full work duty with shoulder pain no  greater than 2/10.        Plan  Therapy options: will be seen for skilled therapy services  Planned modality interventions: ultrasound, traction, thermotherapy (hydrocollator packs), TENS, high voltage pulsed current (spasm management), high voltage pulsed current (pain management) and cryotherapy  Planned therapy interventions: therapeutic activities, stretching, strengthening, spinal/joint mobilization, soft tissue mobilization, postural training, neuromuscular re-education, motor coordination training, manual therapy, abdominal trunk stabilization, ADL retraining, balance/weight-bearing training, joint mobilization, IADL retraining, home exercise program, functional ROM exercises, flexibility, fine motor coordination training and body mechanics training  Frequency: 2x week  Duration in weeks: 8  Treatment plan discussed with: patient          Access Code: [email or web address]  URL: [email or web address]  Date: 10/06/2023  Prepared by: Beba Mcgrath    Exercises  - Ulnar Nerve Flossing  - 1 x daily - 7 x weekly - 2 sets - 10 reps  - Standing Ulnar Nerve Glide  - 1 x daily - 7 x weekly - 2 sets - 10 reps  - Ulnar Nerve Butterfly  - 1 x daily - 7 x weekly - 2 sets - 10 reps  - Standing Bilateral Low Shoulder Row with Anchored Resistance  - 1 x daily - 7 x weekly - 2 sets - 10 reps  - Shoulder Extension with Resistance  - 1 x daily - 7 x weekly - 2 sets - 10 reps  - Shoulder Extension with Resistance  - 1 x daily - 7 x weekly - 2 sets - 10 reps    Manual Therapy:         mins  54697;  Therapeutic Exercise:         mins  36637;     Neuromuscular Blas:        mins  24630;    Therapeutic Activity:     25     mins  80897;     Gait Training:           mins  40946;     Ultrasound:          mins  90287;    Electrical Stimulation:         mins  68706 ( );  Dry Needling         mins self-pay    Timed Treatment:   25   mins   Total Treatment:     55   mins    PT SIGNATURE: Beba Mcgrath, PT   DATE TREATMENT INITIATED:  10/9/2023    Initial Certification  Certification Period: 1/7/2024  I certify that the therapy services are furnished while this patient is under my care.  The services outlined above are required by this patient, and will be reviewed every 90 days.     PHYSICIAN: Drake Granda MD      DATE:     Please sign and return via fax to [phone].. Thank you, [facility].

## 2023-10-09 ENCOUNTER — TREATMENT (OUTPATIENT)
Dept: PHYSICAL THERAPY | Facility: CLINIC | Age: 38
End: 2023-10-09
Payer: COMMERCIAL

## 2023-10-09 DIAGNOSIS — G56.21 ULNAR NERVE COMPRESSION AT MULTIPLE LEVELS, RIGHT: Primary | ICD-10-CM

## 2023-10-09 PROCEDURE — 97035 APP MDLTY 1+ULTRASOUND EA 15: CPT | Performed by: PHYSICAL THERAPIST

## 2023-10-09 PROCEDURE — 97530 THERAPEUTIC ACTIVITIES: CPT | Performed by: PHYSICAL THERAPIST

## 2023-10-09 PROCEDURE — 97110 THERAPEUTIC EXERCISES: CPT | Performed by: PHYSICAL THERAPIST

## 2023-10-09 NOTE — PROGRESS NOTES
Physical Therapy Daily Treatment Note         [address] [facility] [address]              [place] [address]    Patient: Andreina Segovia   : 1985  Diagnosis/ICD-10 Code:  Ulnar nerve compression at multiple levels, right [G56.21]  Referring practitioner: Drake Granda MD  Date of Initial Visit: Type: THERAPY  Noted: 10/6/2023  Today's Date: 10/10/2023  Patient seen for 2 sessions         Andreina Segovia reports: brace is very helpful, did help a lot of the numbness at night.         Objective   See Exercise, Manual, and Modality Logs for complete treatment.       Assessment/Plan  Working on scapular stability and nerve gliding.   Progress per Plan of Care           Manual Therapy:         mins  88617;  Therapeutic Exercise:    35     mins  46235;     Neuromuscular Blas:        mins  06733;    Therapeutic Activity:     10     mins  91850;     Gait Training:           mins  79495;     Ultrasound:     13     mins  69512;    Electrical Stimulation:         mins  94588 ( );  Dry Needling          mins self-pay    Timed Treatment:   58   mins   Total Treatment:     58   mins    Beba Mcgrath PT  Physical Therapist

## 2023-10-12 ENCOUNTER — TREATMENT (OUTPATIENT)
Dept: PHYSICAL THERAPY | Facility: CLINIC | Age: 38
End: 2023-10-12
Payer: COMMERCIAL

## 2023-10-12 DIAGNOSIS — G56.21 ULNAR NERVE COMPRESSION AT MULTIPLE LEVELS, RIGHT: Primary | ICD-10-CM

## 2023-10-12 PROCEDURE — 97035 APP MDLTY 1+ULTRASOUND EA 15: CPT | Performed by: PHYSICAL THERAPIST

## 2023-10-12 PROCEDURE — 97530 THERAPEUTIC ACTIVITIES: CPT | Performed by: PHYSICAL THERAPIST

## 2023-10-12 PROCEDURE — 97110 THERAPEUTIC EXERCISES: CPT | Performed by: PHYSICAL THERAPIST

## 2023-10-13 NOTE — PROGRESS NOTES
Physical Therapy Daily Treatment Note         [address] [facility] [address]              [place] [address]    Patient: Andreina Segovia   : 1985  Diagnosis/ICD-10 Code:  Ulnar nerve compression at multiple levels, right [G56.21]  Referring practitioner: Drake Granda MD  Date of Initial Visit: Type: THERAPY  Noted: 10/6/2023  Today's Date: 10/13/2023  Patient seen for 3 sessions         Andreina Segovia reports: shoulder more sore the past few days        Objective   See Exercise, Manual, and Modality Logs for complete treatment.       Assessment/Plan  Will continue working on shoulder AAROM, nerve gliding, and scapular stability.   Progress per Plan of Care           Manual Therapy:         mins  20400;  Therapeutic Exercise:    35     mins  41735;     Neuromuscular Blas:        mins  16801;    Therapeutic Activity:     10     mins  04765;     Gait Training:           mins  69355;     Ultrasound:     13     mins  68414;    Electrical Stimulation:         mins  20573 (MC );  Dry Needling          mins self-pay    Timed Treatment:   58   mins   Total Treatment:     58   mins    Beba Mcgrath PT  Physical Therapist

## 2023-10-16 ENCOUNTER — TREATMENT (OUTPATIENT)
Dept: PHYSICAL THERAPY | Facility: CLINIC | Age: 38
End: 2023-10-16
Payer: COMMERCIAL

## 2023-10-16 DIAGNOSIS — G56.21 ULNAR NERVE COMPRESSION AT MULTIPLE LEVELS, RIGHT: Primary | ICD-10-CM

## 2023-10-16 PROCEDURE — 97530 THERAPEUTIC ACTIVITIES: CPT | Performed by: PHYSICAL THERAPIST

## 2023-10-16 PROCEDURE — 97035 APP MDLTY 1+ULTRASOUND EA 15: CPT | Performed by: PHYSICAL THERAPIST

## 2023-10-16 PROCEDURE — 97110 THERAPEUTIC EXERCISES: CPT | Performed by: PHYSICAL THERAPIST

## 2023-10-16 NOTE — PROGRESS NOTES
Physical Therapy Daily Treatment Note         [address] [facility] [address]              [place] [address]    Patient: Andreina Segovia   : 1985  Diagnosis/ICD-10 Code:  Ulnar nerve compression at multiple levels, right [G56.21]  Referring practitioner: Drake Granda MD  Date of Initial Visit: Type: THERAPY  Noted: 10/6/2023  Today's Date: 10/17/2023  Patient seen for 4 sessions         Andreina Segovia reports: shoulder felt better after last visit but did too much with hand out of brace yesterday and hand hurts worse.       Objective   See Exercise, Manual, and Modality Logs for complete treatment.       Assessment/Plan  Will continue as planned. Nerve is very reactive and will have to progress slowly.   Progress per Plan of Care           Manual Therapy:         mins  48144;  Therapeutic Exercise:    35     mins  58797;     Neuromuscular Blas:        mins  62733;    Therapeutic Activity:     10     mins  74572;     Gait Training:           mins  67848;     Ultrasound:     13     mins  86715;    Electrical Stimulation:         mins  21255 ( );  Dry Needling          mins self-pay    Timed Treatment:   58   mins   Total Treatment:     58   mins    Beba Mcgrath PT  Physical Therapist

## 2023-10-18 ENCOUNTER — TREATMENT (OUTPATIENT)
Dept: PHYSICAL THERAPY | Facility: CLINIC | Age: 38
End: 2023-10-18
Payer: COMMERCIAL

## 2023-10-18 DIAGNOSIS — G56.21 ULNAR NERVE COMPRESSION AT MULTIPLE LEVELS, RIGHT: Primary | ICD-10-CM

## 2023-10-18 PROCEDURE — 97110 THERAPEUTIC EXERCISES: CPT | Performed by: PHYSICAL THERAPIST

## 2023-10-18 PROCEDURE — 97035 APP MDLTY 1+ULTRASOUND EA 15: CPT | Performed by: PHYSICAL THERAPIST

## 2023-10-18 PROCEDURE — 97530 THERAPEUTIC ACTIVITIES: CPT | Performed by: PHYSICAL THERAPIST

## 2023-10-19 NOTE — PROGRESS NOTES
Physical Therapy Daily Treatment Note         [address] [facility] [address]              [place] [address]    Patient: Andreina Segovia   : 1985  Diagnosis/ICD-10 Code:  Ulnar nerve compression at multiple levels, right [G56.21]  Referring practitioner: Drake Granda MD  Date of Initial Visit: Type: THERAPY  Noted: 10/6/2023  Today's Date: 10/19/2023  Patient seen for 5 sessions         Andreina Segovia reports: shoulder doing fair, hand is better than last visit but still having 4th and 5th digit numbness.         Objective   See Exercise, Manual, and Modality Logs for complete treatment.       Assessment/Plan  Will continue with scapular stability, nerve gliding, and US on compression sites   Progress per Plan of Care           Manual Therapy:         mins  99723;  Therapeutic Exercise:    35     mins  44463;     Neuromuscular Blas:        mins  75126;    Therapeutic Activity:     10     mins  96019;     Gait Training:           mins  11702;     Ultrasound:     13     mins  98728;    Electrical Stimulation:         mins  09152 ( );  Dry Needling          mins self-pay    Timed Treatment:   58   mins   Total Treatment:     58   mins    Beba Mcgrath, HEATHER  Physical Therapist

## 2023-10-23 ENCOUNTER — TREATMENT (OUTPATIENT)
Dept: PHYSICAL THERAPY | Facility: CLINIC | Age: 38
End: 2023-10-23
Payer: COMMERCIAL

## 2023-10-23 DIAGNOSIS — G56.21 ULNAR NERVE COMPRESSION AT MULTIPLE LEVELS, RIGHT: Primary | ICD-10-CM

## 2023-10-23 PROCEDURE — 97530 THERAPEUTIC ACTIVITIES: CPT | Performed by: PHYSICAL THERAPIST

## 2023-10-23 PROCEDURE — 97140 MANUAL THERAPY 1/> REGIONS: CPT | Performed by: PHYSICAL THERAPIST

## 2023-10-23 PROCEDURE — 97035 APP MDLTY 1+ULTRASOUND EA 15: CPT | Performed by: PHYSICAL THERAPIST

## 2023-10-23 PROCEDURE — 97110 THERAPEUTIC EXERCISES: CPT | Performed by: PHYSICAL THERAPIST

## 2023-10-23 NOTE — PROGRESS NOTES
Physical Therapy Daily Treatment Note         [address] [facility] [address]              [place] [address]    Patient: Andreina Segovia   : 1985  Diagnosis/ICD-10 Code:  Ulnar nerve compression at multiple levels, right [G56.21]  Referring practitioner: Drake Granda MD  Date of Initial Visit: Type: THERAPY  Noted: 10/6/2023  Today's Date: 10/23/2023  Patient seen for 6 sessions         Andreina Segovia reports: shoulder improving. Wrist still largest issue.         Objective   See Exercise, Manual, and Modality Logs for complete treatment.       Assessment/Plan  Added tool assisted STM along ulnar nerve and patient had positive reaction.   Progress per Plan of Care           Manual Therapy:    8     mins  07085;  Therapeutic Exercise:    35     mins  33412;     Neuromuscular Blas:        mins  47058;    Therapeutic Activity:     10     mins  01228;     Gait Training:           mins  78782;     Ultrasound:      13  mins  77303;    Electrical Stimulation:         mins  38892 (MC );  Dry Needling          mins self-pay    Timed Treatment:   66   mins   Total Treatment:     66   mins    Beba Mcgrath PT  Physical Therapist

## 2023-10-26 ENCOUNTER — TREATMENT (OUTPATIENT)
Dept: PHYSICAL THERAPY | Facility: CLINIC | Age: 38
End: 2023-10-26
Payer: COMMERCIAL

## 2023-10-26 DIAGNOSIS — G56.21 ULNAR NERVE COMPRESSION AT MULTIPLE LEVELS, RIGHT: Primary | ICD-10-CM

## 2023-10-26 NOTE — PROGRESS NOTES
"   Physical Therapy Daily Treatment Note         [address] [facility] [address]              [place] [address]    Patient: Andreina Segovia   : 1985  Diagnosis/ICD-10 Code:  Ulnar nerve compression at multiple levels, right [G56.21]  Referring practitioner: Drake Granda MD  Date of Initial Visit: Type: THERAPY  Noted: 10/6/2023  Today's Date: 10/26/2023  Patient seen for 7 sessions         Andreina Segovia reports: some elbow pain coming out of night splint and some zingers in ulnar side of little finger.         Objective   See Exercise, Manual, and Modality Logs for complete treatment.       Assessment/Plan  Still having overall improvement. Encouraged patient to keep consistent. The \"zingers\" in fingers can sometimes be the nerve healing.   Progress per Plan of Care           Manual Therapy:    8     mins  24385;  Therapeutic Exercise:    35     mins  57085;     Neuromuscular Blas:        mins  63932;    Therapeutic Activity:     10     mins  76952;     Gait Training:           mins  77670;     Ultrasound:     13     mins  75699;    Electrical Stimulation:         mins  01033 ( );  Dry Needling          mins self-pay    Timed Treatment:      66mins   Total Treatment:     66   mins    Beba Mcgrath PT  Physical Therapist                      "

## 2023-10-30 ENCOUNTER — HOSPITAL ENCOUNTER (OUTPATIENT)
Dept: MRI IMAGING | Facility: HOSPITAL | Age: 38
Discharge: HOME | End: 2023-10-30
Admitting: ORTHOPAEDIC SURGERY
Payer: COMMERCIAL

## 2023-10-30 DIAGNOSIS — M75.21 BICIPITAL TENDINITIS OF RIGHT SHOULDER: ICD-10-CM

## 2023-10-30 PROCEDURE — 73221 MRI JOINT UPR EXTREM W/O DYE: CPT

## 2023-10-31 ENCOUNTER — TREATMENT (OUTPATIENT)
Dept: PHYSICAL THERAPY | Facility: CLINIC | Age: 38
End: 2023-10-31
Payer: COMMERCIAL

## 2023-10-31 DIAGNOSIS — G56.21 ULNAR NERVE COMPRESSION AT MULTIPLE LEVELS, RIGHT: Primary | ICD-10-CM

## 2023-10-31 PROCEDURE — 97530 THERAPEUTIC ACTIVITIES: CPT | Performed by: PHYSICAL THERAPIST

## 2023-10-31 PROCEDURE — 97035 APP MDLTY 1+ULTRASOUND EA 15: CPT | Performed by: PHYSICAL THERAPIST

## 2023-10-31 PROCEDURE — 97110 THERAPEUTIC EXERCISES: CPT | Performed by: PHYSICAL THERAPIST

## 2023-10-31 NOTE — PROGRESS NOTES
Physical Therapy Daily Treatment Note         [address] [facility] [address]              [place] [address]    Patient: Andreina Segovia   : 1985  Diagnosis/ICD-10 Code:  Ulnar nerve compression at multiple levels, right [G56.21]  Referring practitioner: Drake Granda MD  Date of Initial Visit: Type: THERAPY  Noted: 10/6/2023  Today's Date: 10/31/2023  Patient seen for 8 sessions         Andreina Segovia reports: had shoulder MRI which showed a low grade articular sided partial tear of superior distal fibers of subscapularis tendon.         Objective   See Exercise, Manual, and Modality Logs for complete treatment.       Assessment/Plan  Will continue gentle shoulder AAROM and scapular strengthening, she will be discussing options with MD.   Progress per Plan of Care           Manual Therapy:         mins  85022;  Therapeutic Exercise:    35     mins  06486;     Neuromuscular Blas:        mins  48151;    Therapeutic Activity:     10     mins  09587;     Gait Training:           mins  16056;     Ultrasound:     13     mins  96046;    Electrical Stimulation:         mins  75373 ( );  Dry Needling          mins self-pay    Timed Treatment:   58   mins   Total Treatment:     58   mins    Beba Mcgrath PT  Physical Therapist

## 2023-11-03 ENCOUNTER — TREATMENT (OUTPATIENT)
Dept: PHYSICAL THERAPY | Facility: CLINIC | Age: 38
End: 2023-11-03
Payer: COMMERCIAL

## 2023-11-03 DIAGNOSIS — G56.21 ULNAR NERVE COMPRESSION AT MULTIPLE LEVELS, RIGHT: Primary | ICD-10-CM

## 2023-11-03 PROCEDURE — 97110 THERAPEUTIC EXERCISES: CPT | Performed by: PHYSICAL THERAPIST

## 2023-11-03 PROCEDURE — 97530 THERAPEUTIC ACTIVITIES: CPT | Performed by: PHYSICAL THERAPIST

## 2023-11-03 PROCEDURE — 97035 APP MDLTY 1+ULTRASOUND EA 15: CPT | Performed by: PHYSICAL THERAPIST

## 2023-11-03 NOTE — PROGRESS NOTES
Physical Therapy Daily Treatment Note         [address] [facility] [address]              [place] [address]    Patient: Andreina Segovia   : 1985  Diagnosis/ICD-10 Code:  Ulnar nerve compression at multiple levels, right [G56.21]  Referring practitioner: Drake Granda MD  Date of Initial Visit: Type: THERAPY  Noted: 10/6/2023  Today's Date: 11/3/2023  Patient seen for 9 sessions         Andreina Segovia reports: shoulder hurts but wrist does feel like it continues to improve. Will be following up with MD next week.       Objective   See Exercise, Manual, and Modality Logs for complete treatment.       Assessment/Plan  Will continue shoulder AAROM and progress as tolerated.   Progress per Plan of Care           Manual Therapy:         mins  58788;  Therapeutic Exercise:    35     mins  76268;     Neuromuscular Blas:        mins  52903;    Therapeutic Activity:     10     mins  53602;     Gait Training:           mins  79491;     Ultrasound:     13     mins  57509;    Electrical Stimulation:         mins  14970 ( );  Dry Needling          mins self-pay    Timed Treatment:   58   mins   Total Treatment:     58   mins    Beba Mcgrath, PT  Physical Therapist

## 2023-11-08 ENCOUNTER — TREATMENT (OUTPATIENT)
Dept: PHYSICAL THERAPY | Facility: CLINIC | Age: 38
End: 2023-11-08
Payer: COMMERCIAL

## 2023-11-08 DIAGNOSIS — G56.21 ULNAR NERVE COMPRESSION AT MULTIPLE LEVELS, RIGHT: Primary | ICD-10-CM

## 2023-11-08 PROCEDURE — 97110 THERAPEUTIC EXERCISES: CPT | Performed by: PHYSICAL THERAPIST

## 2023-11-08 PROCEDURE — 97035 APP MDLTY 1+ULTRASOUND EA 15: CPT | Performed by: PHYSICAL THERAPIST

## 2023-11-08 PROCEDURE — 97530 THERAPEUTIC ACTIVITIES: CPT | Performed by: PHYSICAL THERAPIST

## 2023-11-10 ENCOUNTER — TELEPHONE (OUTPATIENT)
Dept: PHYSICAL THERAPY | Facility: CLINIC | Age: 38
End: 2023-11-10

## 2023-11-10 NOTE — TELEPHONE ENCOUNTER
Caller: Andreina Segovia    Relationship: Self    What was the call regarding: PATIENT WILL NOT BE AT TODAY'S APPT.  HER CHILD HAD A CONFLICTING APPT.

## 2023-11-13 ENCOUNTER — TREATMENT (OUTPATIENT)
Dept: PHYSICAL THERAPY | Facility: CLINIC | Age: 38
End: 2023-11-13
Payer: COMMERCIAL

## 2023-11-13 DIAGNOSIS — G56.21 ULNAR NERVE COMPRESSION AT MULTIPLE LEVELS, RIGHT: Primary | ICD-10-CM

## 2023-11-13 DIAGNOSIS — M75.111 INCOMPLETE TEAR OF RIGHT ROTATOR CUFF, UNSPECIFIED WHETHER TRAUMATIC: ICD-10-CM

## 2023-11-13 PROCEDURE — 97035 APP MDLTY 1+ULTRASOUND EA 15: CPT | Performed by: PHYSICAL THERAPIST

## 2023-11-13 PROCEDURE — 97110 THERAPEUTIC EXERCISES: CPT | Performed by: PHYSICAL THERAPIST

## 2023-11-13 NOTE — PROGRESS NOTES
Physical Therapy Daily Treatment Note         [address] [facility] [address]              [place] [address]    Patient: Andreina Segovia   : 1985  Diagnosis/ICD-10 Code:  Ulnar nerve compression at multiple levels, right [G56.21]  Referring practitioner: Drake Granda MD  Date of Initial Visit: Type: THERAPY  Noted: 10/6/2023  Today's Date: 2023  Patient seen for 10 sessions         Andreina Segovia reports: there is a partial tear of subscapularis of right shoulder. Debating on surgery due to chronicity of this issue and lasting pain and loss of function.         Objective   See Exercise, Manual, and Modality Logs for complete treatment.       Assessment/Plan  Will assess shoulder more next visit. Will continue working on shoulder AAROM, this is likely contributing to ulnar nerve irritation and is appropriate to include in currently PoC.   Progress per Plan of Care           Manual Therapy:         mins  31289;  Therapeutic Exercise:    35     mins  22421;     Neuromuscular Blas:       mins  64809;    Therapeutic Activity:     10     mins  50863;     Gait Training:           mins  10085;     Ultrasound:     13     mins  47197;    Electrical Stimulation:    =     mins  49713 ( );  Dry Needling          mins self-pay    Timed Treatment:   58   mins   Total Treatment:     58   mins    Beba Mcgrath PT  Physical Therapist

## 2023-11-13 NOTE — PROGRESS NOTES
Physical Therapy Progress Note         [address] [facility] [address]              [place] [address]    Patient: Andreina Segovia   : 1985  Diagnosis/ICD-10 Code:  Ulnar nerve compression at multiple levels, right [G56.21]  Referring practitioner: Drake Granda MD  Date of Initial Visit: Type: THERAPY  Noted: 10/6/2023  Today's Date: 2023  Patient seen for 11 sessions         Andreina Segovia reports: numbness in hand has improved greatly.       Objective          Tenderness     Right Shoulder  Tenderness in the subscapularis tendon.     Active Range of Motion     Right Shoulder   Flexion: 120 degrees with pain  Abduction: 95 degrees with pain  External rotation BTH: C5 with pain  Internal rotation BTB: T12     Strength/Myotome Testing     Right Shoulder     Planes of Motion   Flexion: 2+   Abduction: 2+   External rotation at 0°: 3+   Internal rotation at 0°: 4-       (-) tinels at guyons canal, mild tenderness here.  No tenderness and (-) tinels at cubital tunnel.     See Exercise, Manual, and Modality Logs for complete treatment.       Assessment/Plan  Ulnar nerve syndrome seems to be improving well. Will shift more focus onto right shoulder. She is lacking strength and mobility here secondary to pain due to partial subscapularis tear. 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     Progress toward previous goals: Partially Met    Short Term Goals (3 weeks):  1. Patient will be independent with home exercise program.   MET   2. Patient will demonstrate improved shoulder strength by 50%.   Progressing   3. Patient will demonstrate improved right  strength to at least equal to non-dominant hand.   Progressing      Long Term Goals (8 weeks):  1. Patient will be able to lift at least 5 lbs overhead with shoulder pain no greater than 2/10.   Progressing   2. Patient will report no numbness of 4th and 5th digits over a weeks time.    MET   3. Patient will be  able to return to full work duty with shoulder pain no greater than 2/10.  Progressing     Progress per Plan of Care           Manual Therapy:         mins  30120;  Therapeutic Exercise:    45     mins  24841;     Neuromuscular Blas:        mins  91300;    Therapeutic Activity:          mins  60322;     Gait Training:           mins  39029;     Ultrasound:     13     mins  55190;    Electrical Stimulation:         mins  85675 ( );  Dry Needling          mins self-pay    Timed Treatment:   58   mins   Total Treatment:     58   mins    Beba Mcgrath PT  Physical Therapist

## 2023-11-15 ENCOUNTER — TREATMENT (OUTPATIENT)
Dept: PHYSICAL THERAPY | Facility: CLINIC | Age: 38
End: 2023-11-15
Payer: COMMERCIAL

## 2023-11-15 DIAGNOSIS — G56.21 ULNAR NERVE COMPRESSION AT MULTIPLE LEVELS, RIGHT: ICD-10-CM

## 2023-11-15 DIAGNOSIS — M75.111 INCOMPLETE TEAR OF RIGHT ROTATOR CUFF, UNSPECIFIED WHETHER TRAUMATIC: Primary | ICD-10-CM

## 2023-11-15 PROCEDURE — A4556 ELECTRODES, PAIR: HCPCS | Performed by: PHYSICAL THERAPIST

## 2023-11-15 PROCEDURE — 97110 THERAPEUTIC EXERCISES: CPT | Performed by: PHYSICAL THERAPIST

## 2023-11-15 PROCEDURE — 97530 THERAPEUTIC ACTIVITIES: CPT | Performed by: PHYSICAL THERAPIST

## 2023-11-15 NOTE — PROGRESS NOTES
Physical Therapy Daily Treatment Note         [address] [facility] [address]              [place] [address]    Patient: Andreina Segovia   : 1985  Diagnosis/ICD-10 Code:  Incomplete tear of right rotator cuff, unspecified whether traumatic [M75.111]  Referring practitioner: Drake Granda MD  Date of Initial Visit: Type: THERAPY  Noted: 10/6/2023  Today's Date: 11/15/2023  Patient seen for 12 sessions         Andreina Segovia reports: shoulder is okay today because she did not work last two nights. Did bring dexamethasone prescription        Objective   See Exercise, Manual, and Modality Logs for complete treatment.       Assessment/Plan  Will utilize dexamethasone with iontophoresis on anterior right shoulder.   Progress per Plan of Care           Manual Therapy:         mins  72215;  Therapeutic Exercise:    45     mins  48573;     Neuromuscular Blas:        mins  79511;    Therapeutic Activity:     10     mins  42020;     Gait Training:           mins  44477;     Ultrasound:          mins  99106;    Electrical Stimulation:        mins  91372 ( );  Dry Needling          mins self-pay    Timed Treatment:   55   mins   Total Treatment:     55   mins    Beba Mcgrath PT  Physical Therapist

## 2023-11-20 ENCOUNTER — TREATMENT (OUTPATIENT)
Dept: PHYSICAL THERAPY | Facility: CLINIC | Age: 38
End: 2023-11-20
Payer: COMMERCIAL

## 2023-11-20 DIAGNOSIS — G56.21 ULNAR NERVE COMPRESSION AT MULTIPLE LEVELS, RIGHT: ICD-10-CM

## 2023-11-20 DIAGNOSIS — M75.111 INCOMPLETE TEAR OF RIGHT ROTATOR CUFF, UNSPECIFIED WHETHER TRAUMATIC: Primary | ICD-10-CM

## 2023-11-20 PROCEDURE — 97110 THERAPEUTIC EXERCISES: CPT | Performed by: PHYSICAL THERAPIST

## 2023-11-20 PROCEDURE — A4556 ELECTRODES, PAIR: HCPCS | Performed by: PHYSICAL THERAPIST

## 2023-11-20 PROCEDURE — 97530 THERAPEUTIC ACTIVITIES: CPT | Performed by: PHYSICAL THERAPIST

## 2023-11-20 NOTE — PROGRESS NOTES
Physical Therapy Daily Treatment Note         [address] [facility] [address]              [place] [address]    Patient: Andreina Segovia   : 1985  Diagnosis/ICD-10 Code:  Incomplete tear of right rotator cuff, unspecified whether traumatic [M75.111]  Referring practitioner: Drake Granda MD  Date of Initial Visit: Type: THERAPY  Noted: 10/6/2023  Today's Date: 2023  Patient seen for 13 sessions         Andreina Segovia reports: not too bad today.         Objective     Shoulder AROM  Flex 130 deg   Abd 100 deg    See Exercise, Manual, and Modality Logs for complete treatment.       Assessment/Plan  May be responding well to iontophoresis. AROM is improving.   Progress per Plan of Care           Manual Therapy:         mins  99158;  Therapeutic Exercise:    45     mins  26414;     Neuromuscular Blas:        mins  13432;    Therapeutic Activity:     10     mins  55591;     Gait Training:           mins  82206;     Ultrasound:          mins  35060;    Electrical Stimulation:         mins  42723 ( );  Dry Needling          mins self-pay    Timed Treatment:   55   mins   Total Treatment:     55   mins    Beba Mcgrath, HEATHER  Physical Therapist

## 2023-11-22 ENCOUNTER — TREATMENT (OUTPATIENT)
Dept: PHYSICAL THERAPY | Facility: CLINIC | Age: 38
End: 2023-11-22
Payer: COMMERCIAL

## 2023-11-22 DIAGNOSIS — M75.111 INCOMPLETE TEAR OF RIGHT ROTATOR CUFF, UNSPECIFIED WHETHER TRAUMATIC: ICD-10-CM

## 2023-11-22 DIAGNOSIS — M25.511 RIGHT SHOULDER PAIN, UNSPECIFIED CHRONICITY: Primary | ICD-10-CM

## 2023-11-22 DIAGNOSIS — G56.21 ULNAR NERVE COMPRESSION AT MULTIPLE LEVELS, RIGHT: ICD-10-CM

## 2023-11-22 PROCEDURE — 97110 THERAPEUTIC EXERCISES: CPT | Performed by: PHYSICAL THERAPIST

## 2023-11-22 PROCEDURE — 97530 THERAPEUTIC ACTIVITIES: CPT | Performed by: PHYSICAL THERAPIST

## 2023-11-22 NOTE — PROGRESS NOTES
Physical Therapy Daily Treatment Note         [address] [facility] [address]              [place] [address]    Patient: Andreina Segovia   : 1985  Diagnosis/ICD-10 Code:  Right shoulder pain, unspecified chronicity [M25.511]  Referring practitioner: Drake Granda MD  Date of Initial Visit: Type: THERAPY  Noted: 10/6/2023  Today's Date: 2023  Patient seen for 14 sessions         Andreina Segovia reports: no new complaints         Objective   See Exercise, Manual, and Modality Logs for complete treatment.       Assessment/Plan  Will continue working on shoulder mobility and functional use of arm.   Progress per Plan of Care           Manual Therapy:         mins  23913;  Therapeutic Exercise:    45     mins  96876;     Neuromuscular Blas:        mins  60915;    Therapeutic Activity:     10     mins  35731;     Gait Training:           mins  54184;     Ultrasound:          mins  02068;    Electrical Stimulation:         mins  17518 ( );  Dry Needling          mins self-pay    Timed Treatment:   55   mins   Total Treatment:     55   mins    Beba Mcgrath PT  Physical Therapist

## 2023-12-04 ENCOUNTER — TREATMENT (OUTPATIENT)
Dept: PHYSICAL THERAPY | Facility: CLINIC | Age: 38
End: 2023-12-04
Payer: COMMERCIAL

## 2023-12-04 DIAGNOSIS — M75.111 INCOMPLETE TEAR OF RIGHT ROTATOR CUFF, UNSPECIFIED WHETHER TRAUMATIC: ICD-10-CM

## 2023-12-04 DIAGNOSIS — G56.21 ULNAR NERVE COMPRESSION AT MULTIPLE LEVELS, RIGHT: ICD-10-CM

## 2023-12-04 DIAGNOSIS — M25.511 RIGHT SHOULDER PAIN, UNSPECIFIED CHRONICITY: Primary | ICD-10-CM

## 2023-12-04 PROCEDURE — 97140 MANUAL THERAPY 1/> REGIONS: CPT | Performed by: PHYSICAL THERAPIST

## 2023-12-04 PROCEDURE — 97110 THERAPEUTIC EXERCISES: CPT | Performed by: PHYSICAL THERAPIST

## 2023-12-04 NOTE — PROGRESS NOTES
Physical Therapy Daily Treatment Note         [address] [facility] [address]              [place] [address]    Patient: Andreina Segovia   : 1985  Diagnosis/ICD-10 Code:  Right shoulder pain, unspecified chronicity [M25.511]  Referring practitioner: Drake Granda MD  Date of Initial Visit: Type: THERAPY  Noted: 10/6/2023  Today's Date: 2023  Patient seen for 15 sessions         Andreina Segovia reports: sore in shoulder. Finger numbness has been fine, does feel a catch in the elbow.         Objective   Shoulder AROM  Flex 135 deg   Abd 110 deg     See Exercise, Manual, and Modality Logs for complete treatment.       Assessment/Plan  Gaining in shoulder AROM  Progress per Plan of Care and Progress strengthening /stabilization /functional activity           Manual Therapy:    10     mins  40707;  Therapeutic Exercise:    45     mins  74660;     Neuromuscular Blas:        mins  07910;    Therapeutic Activity:          mins  72060;     Gait Training:           mins  98522;     Ultrasound:          mins  39080;    Electrical Stimulation:         mins  96465 ( );  Dry Needling         mins self-pay    Timed Treatment:   55   mins   Total Treatment:     55   mins    Bbea Mcgrath PT  Physical Therapist

## 2023-12-08 ENCOUNTER — TREATMENT (OUTPATIENT)
Dept: PHYSICAL THERAPY | Facility: CLINIC | Age: 38
End: 2023-12-08
Payer: COMMERCIAL

## 2023-12-08 DIAGNOSIS — G56.21 ULNAR NERVE COMPRESSION AT MULTIPLE LEVELS, RIGHT: ICD-10-CM

## 2023-12-08 DIAGNOSIS — M25.511 RIGHT SHOULDER PAIN, UNSPECIFIED CHRONICITY: Primary | ICD-10-CM

## 2023-12-08 DIAGNOSIS — M75.111 INCOMPLETE TEAR OF RIGHT ROTATOR CUFF, UNSPECIFIED WHETHER TRAUMATIC: ICD-10-CM

## 2023-12-08 PROCEDURE — 97140 MANUAL THERAPY 1/> REGIONS: CPT | Performed by: PHYSICAL THERAPIST

## 2023-12-08 PROCEDURE — 97110 THERAPEUTIC EXERCISES: CPT | Performed by: PHYSICAL THERAPIST

## 2023-12-08 NOTE — PROGRESS NOTES
Physical Therapy Daily Treatment Note         [address] [facility] [address]              [place] [address]    Patient: Andreina Segovia   : 1985  Diagnosis/ICD-10 Code:  Right shoulder pain, unspecified chronicity [M25.511]  Referring practitioner: Drake Granda MD  Date of Initial Visit: Type: THERAPY  Noted: 10/6/2023  Today's Date: 2023  Patient seen for 16 sessions         Adnreina Segovia reports: shoulder is starting to feel better.         Objective   See Exercise, Manual, and Modality Logs for complete treatment.       Assessment/Plan  Subjective improvements in shoulder, will continue working on strength and mobility as well as ulnar nerve tension.   Progress per Plan of Care           Manual Therapy:    10     mins  57290;  Therapeutic Exercise:    45     mins  30920;     Neuromuscular Blas:        mins  05887;    Therapeutic Activity:          mins  74269;     Gait Training:           mins  60106;     Ultrasound:          mins  93992;    Electrical Stimulation:         mins  69288 (MC );  Dry Needling          mins self-pay    Timed Treatment:   55   mins   Total Treatment:     55   mins    Beba Mcgrath PT  Physical Therapist

## 2023-12-21 ENCOUNTER — TREATMENT (OUTPATIENT)
Dept: PHYSICAL THERAPY | Facility: CLINIC | Age: 38
End: 2023-12-21
Payer: COMMERCIAL

## 2023-12-21 ENCOUNTER — TRANSCRIBE ORDERS (OUTPATIENT)
Dept: PHYSICAL THERAPY | Facility: CLINIC | Age: 38
End: 2023-12-21
Payer: COMMERCIAL

## 2023-12-21 DIAGNOSIS — S49.91XS SHOULDER INJURY, RIGHT, SEQUELA: Primary | ICD-10-CM

## 2023-12-21 DIAGNOSIS — G56.21 ULNAR NERVE COMPRESSION AT MULTIPLE LEVELS, RIGHT: ICD-10-CM

## 2023-12-21 DIAGNOSIS — M25.511 RIGHT SHOULDER PAIN, UNSPECIFIED CHRONICITY: Primary | ICD-10-CM

## 2023-12-21 DIAGNOSIS — M75.111 INCOMPLETE TEAR OF RIGHT ROTATOR CUFF, UNSPECIFIED WHETHER TRAUMATIC: ICD-10-CM

## 2023-12-21 PROCEDURE — 97035 APP MDLTY 1+ULTRASOUND EA 15: CPT | Performed by: PHYSICAL THERAPIST

## 2023-12-21 PROCEDURE — 97110 THERAPEUTIC EXERCISES: CPT | Performed by: PHYSICAL THERAPIST

## 2023-12-21 NOTE — PROGRESS NOTES
Physical Therapy Daily Treatment Note         [address] [facility] [address]              [place] [address]    Patient: Andreina Segovia   : 1985  Diagnosis/ICD-10 Code:  Right shoulder pain, unspecified chronicity [M25.511]  Referring practitioner: No ref. provider found  Date of Initial Visit: Type: THERAPY  Noted: 10/6/2023  Today's Date: 2023  Patient seen for 17 sessions         Andreina Segovia reports: still feels like she's getting better with shoulder. Pain stays around 3-4/10. Hand had one instance this past week of going numb because she had to sit for a much longer time at a computer for school.         Objective   See Exercise, Manual, and Modality Logs for complete treatment.       Assessment/Plan  Patient responding very well. Hoping to avoid RTC surgery.   Progress per Plan of Care           Manual Therapy:         mins  88679;  Therapeutic Exercise:    45     mins  92769;     Neuromuscular Blas:        mins  01385;    Therapeutic Activity:          mins  00577;     Gait Training:           mins  78091;     Ultrasound:    13      mins  90221;    Electrical Stimulation:         mins  50457 ( );  Dry Needling         mins self-pay    Timed Treatment:   58   mins   Total Treatment:     58   mins    Beba Mcgrath PT  Physical Therapist

## 2023-12-29 ENCOUNTER — TREATMENT (OUTPATIENT)
Dept: PHYSICAL THERAPY | Facility: CLINIC | Age: 38
End: 2023-12-29
Payer: COMMERCIAL

## 2023-12-29 DIAGNOSIS — M75.111 INCOMPLETE TEAR OF RIGHT ROTATOR CUFF, UNSPECIFIED WHETHER TRAUMATIC: ICD-10-CM

## 2023-12-29 DIAGNOSIS — M25.511 RIGHT SHOULDER PAIN, UNSPECIFIED CHRONICITY: Primary | ICD-10-CM

## 2023-12-29 DIAGNOSIS — G56.21 ULNAR NERVE COMPRESSION AT MULTIPLE LEVELS, RIGHT: ICD-10-CM

## 2023-12-29 PROCEDURE — 97530 THERAPEUTIC ACTIVITIES: CPT | Performed by: PHYSICAL THERAPIST

## 2023-12-29 PROCEDURE — 97110 THERAPEUTIC EXERCISES: CPT | Performed by: PHYSICAL THERAPIST

## 2023-12-29 PROCEDURE — 97035 APP MDLTY 1+ULTRASOUND EA 15: CPT | Performed by: PHYSICAL THERAPIST

## 2023-12-29 NOTE — PROGRESS NOTES
Physical Therapy Daily Treatment Note         [address] [facility] [address]              [place] [address]    Patient: Andreina Segovia   : 1985  Diagnosis/ICD-10 Code:  Right shoulder pain, unspecified chronicity [M25.511]  Referring practitioner: Drake Granda MD  Date of Initial Visit: Type: THERAPY  Noted: 10/6/2023  Today's Date: 2023  Patient seen for 18 sessions         Andreina Segovia reports: not having pain down the arm.         Objective   PROM   Shoulder flexion and abduction full but painful at end range both directions, pain stays at shoulder and does not refer.     See Exercise, Manual, and Modality Logs for complete treatment.       Assessment/Plan  Patient improving with shoulder motion and tolerance. Worked a lot on RTC stability today.   Progress per Plan of Care           Manual Therapy:         mins  62223;  Therapeutic Exercise:    35     mins  61965;     Neuromuscular Blas:        mins  76818;    Therapeutic Activity:     10     mins  53770;     Gait Training:           mins  66251;     Ultrasound:      13    mins  26005;    Electrical Stimulation:         mins  01798 ( );  Dry Needling          mins self-pay    Timed Treatment:   58   mins   Total Treatment:     58   mins    Beba Mcgrath, HEATHER  Physical Therapist

## 2024-01-05 ENCOUNTER — TREATMENT (OUTPATIENT)
Dept: PHYSICAL THERAPY | Facility: CLINIC | Age: 39
End: 2024-01-05
Payer: COMMERCIAL

## 2024-01-05 DIAGNOSIS — M25.511 RIGHT SHOULDER PAIN, UNSPECIFIED CHRONICITY: Primary | ICD-10-CM

## 2024-01-05 DIAGNOSIS — M75.111 INCOMPLETE TEAR OF RIGHT ROTATOR CUFF, UNSPECIFIED WHETHER TRAUMATIC: ICD-10-CM

## 2024-01-05 DIAGNOSIS — G56.21 ULNAR NERVE COMPRESSION AT MULTIPLE LEVELS, RIGHT: ICD-10-CM

## 2024-01-05 PROCEDURE — 97110 THERAPEUTIC EXERCISES: CPT | Performed by: PHYSICAL THERAPIST

## 2024-01-05 PROCEDURE — 97035 APP MDLTY 1+ULTRASOUND EA 15: CPT | Performed by: PHYSICAL THERAPIST

## 2024-01-05 PROCEDURE — 97530 THERAPEUTIC ACTIVITIES: CPT | Performed by: PHYSICAL THERAPIST

## 2024-01-05 NOTE — PROGRESS NOTES
Physical Therapy Daily Treatment Note         [address] [facility] [address]              [place] [address]    Patient: Andreina Segovia   : 1985  Diagnosis/ICD-10 Code:  Right shoulder pain, unspecified chronicity [M25.511]  Referring practitioner: Drake Granda MD  Date of Initial Visit: Type: THERAPY  Noted: 10/6/2023  Today's Date: 2024  Patient seen for 19 sessions         Andreina Segovia reports: pain gets up to a 5/10 with certain activities but this is intermittent.         Objective   Active shoulder   125 deg  flexion  100 deg abd     See Exercise, Manual, and Modality Logs for complete treatment.       Assessment/Plan  She is experiencing improvement in subjective symptoms and slowly gaining active movement.   Progress per Plan of Care and Progress strengthening /stabilization /functional activity           Manual Therapy:         mins  27193;  Therapeutic Exercise:    35     mins  78390;     Neuromuscular Blas:        mins  99912;    Therapeutic Activity:     10     mins  59268;     Gait Training:           mins  67412;     Ultrasound:     13     mins  22690;    Electrical Stimulation:         mins  75615 ( );  Dry Needling          mins self-pay    Timed Treatment:   58   mins   Total Treatment:     58   mins    Beba Mcgrath, HEATHER  Physical Therapist

## 2024-01-10 ENCOUNTER — TREATMENT (OUTPATIENT)
Dept: PHYSICAL THERAPY | Facility: CLINIC | Age: 39
End: 2024-01-10
Payer: COMMERCIAL

## 2024-01-10 DIAGNOSIS — M25.511 RIGHT SHOULDER PAIN, UNSPECIFIED CHRONICITY: Primary | ICD-10-CM

## 2024-01-10 DIAGNOSIS — M75.111 INCOMPLETE TEAR OF RIGHT ROTATOR CUFF, UNSPECIFIED WHETHER TRAUMATIC: ICD-10-CM

## 2024-01-10 DIAGNOSIS — G56.21 ULNAR NERVE COMPRESSION AT MULTIPLE LEVELS, RIGHT: ICD-10-CM

## 2024-01-10 PROCEDURE — 97035 APP MDLTY 1+ULTRASOUND EA 15: CPT | Performed by: PHYSICAL THERAPIST

## 2024-01-10 PROCEDURE — 97530 THERAPEUTIC ACTIVITIES: CPT | Performed by: PHYSICAL THERAPIST

## 2024-01-10 PROCEDURE — 97110 THERAPEUTIC EXERCISES: CPT | Performed by: PHYSICAL THERAPIST

## 2024-01-10 NOTE — PROGRESS NOTES
Physical Therapy Progress Note         [address] [facility] [address]              [place] [address]    Patient: Andreina Segovia   : 1985  Diagnosis/ICD-10 Code:  Right shoulder pain, unspecified chronicity [M25.511]  Referring practitioner: Drake Granda MD  Date of Initial Visit: Type: THERAPY  Noted: 10/6/2023  Today's Date: 1/10/2024  Patient seen for 20 sessions         Andreina Segovia reports: seeing definite improvement to shoulder but still gets painful when trying to push/pull and lift overhead.       Objective   Active right shoulder   Flexion 135 deg   Abduction 120 deg     MMT R shoulder   Flexion 4/5 within available range   Abd 4/5 within available range  ER 4+/5 at 0 deg abd  IR 4+/5 at 0 deg abd     See Exercise, Manual, and Modality Logs for complete treatment.       Assessment/Plan  Patient is improving with shoulder motion and has resolved original issue of hand numbness and elbow pain. We are using conservative therapy to avoid RTC repair following tear. 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 She is still appropriate for physical therapy for this issue as she is making progress but not yet fully met expected function.     Progress toward previous goals: Partially Met    Short Term Goals (3 weeks):  1. Patient will be independent with home exercise program.   MET   2. Patient will demonstrate improved shoulder strength by 50%.   MET   3. Patient will demonstrate improved right  strength to at least equal to non-dominant hand. MET      Long Term Goals (8 weeks):    1. Patient will be able to lift at least 5 lbs overhead with shoulder pain no greater than 2/10. Progressing   2. Patient will report no numbness of 4th and 5th digits over a weeks time.   MET   3. Patient will be able to return to full work duty with shoulder pain no greater than 2/10.  Progressin     Progress per Plan of Care           Manual Therapy:         mins   52296;  Therapeutic Exercise:    35     mins  05236;     Neuromuscular Blas:        mins  12216;    Therapeutic Activity:     10     mins  91456;     Gait Training:           mins  54712;     Ultrasound:     13     mins  63172;    Electrical Stimulation:         mins  83231 ( );  Dry Needling          mins self-pay    Timed Treatment:   58   mins   Total Treatment:     58   mins    Beba Mcgrath, PT  Physical Therapist

## 2024-01-17 ENCOUNTER — TREATMENT (OUTPATIENT)
Dept: PHYSICAL THERAPY | Facility: CLINIC | Age: 39
End: 2024-01-17
Payer: COMMERCIAL

## 2024-01-17 DIAGNOSIS — M25.511 RIGHT SHOULDER PAIN, UNSPECIFIED CHRONICITY: Primary | ICD-10-CM

## 2024-01-17 DIAGNOSIS — G56.21 ULNAR NERVE COMPRESSION AT MULTIPLE LEVELS, RIGHT: ICD-10-CM

## 2024-01-17 DIAGNOSIS — M75.111 INCOMPLETE TEAR OF RIGHT ROTATOR CUFF, UNSPECIFIED WHETHER TRAUMATIC: ICD-10-CM

## 2024-01-17 PROCEDURE — 97110 THERAPEUTIC EXERCISES: CPT | Performed by: PHYSICAL THERAPIST

## 2024-01-17 PROCEDURE — 97530 THERAPEUTIC ACTIVITIES: CPT | Performed by: PHYSICAL THERAPIST

## 2024-01-17 NOTE — PROGRESS NOTES
Physical Therapy Daily Treatment Note         [address] [facility] [address]              [place] [address]    Patient: Andreina Segovia   : 1985  Diagnosis/ICD-10 Code:  Right shoulder pain, unspecified chronicity [M25.511]  Referring practitioner: Drake Granda MD  Date of Initial Visit: Type: THERAPY  Noted: 10/6/2023  Today's Date: 2024  Patient seen for 21 sessions         Andreina Segovia reports: no new complaints         Objective   See Exercise, Manual, and Modality Logs for complete treatment.       Assessment/Plan  Will continue working on mobility and strengthening through various ranges and in functional tasks  Progress per Plan of Care           Manual Therapy:         mins  53877;  Therapeutic Exercise:    43     mins  94338;     Neuromuscular Blas:        mins  51617;    Therapeutic Activity:     10     mins  05865;     Gait Training:           mins  11280;     Ultrasound:          mins  66930;    Electrical Stimulation:         mins  80474 ( );  Dry Needling          mins self-pay    Timed Treatment:   53   mins   Total Treatment:     53   mins    Beba Mcgrath PT  Physical Therapist

## 2024-01-25 ENCOUNTER — TREATMENT (OUTPATIENT)
Dept: PHYSICAL THERAPY | Facility: CLINIC | Age: 39
End: 2024-01-25
Payer: COMMERCIAL

## 2024-01-25 DIAGNOSIS — G56.21 ULNAR NERVE COMPRESSION AT MULTIPLE LEVELS, RIGHT: ICD-10-CM

## 2024-01-25 DIAGNOSIS — M75.111 INCOMPLETE TEAR OF RIGHT ROTATOR CUFF, UNSPECIFIED WHETHER TRAUMATIC: ICD-10-CM

## 2024-01-25 DIAGNOSIS — M25.511 RIGHT SHOULDER PAIN, UNSPECIFIED CHRONICITY: Primary | ICD-10-CM

## 2024-01-25 PROCEDURE — 97110 THERAPEUTIC EXERCISES: CPT | Performed by: PHYSICAL THERAPIST

## 2024-01-25 PROCEDURE — 97112 NEUROMUSCULAR REEDUCATION: CPT | Performed by: PHYSICAL THERAPIST

## 2024-01-25 PROCEDURE — 97530 THERAPEUTIC ACTIVITIES: CPT | Performed by: PHYSICAL THERAPIST

## 2024-01-25 NOTE — PROGRESS NOTES
Physical Therapy Daily Treatment Note         [address] [facility] [address]              [place] [address]    Patient: Andreina Segovia   : 1985  Diagnosis/ICD-10 Code:  Right shoulder pain, unspecified chronicity [M25.511]  Referring practitioner: Drake Granda MD  Date of Initial Visit: Type: THERAPY  Noted: 10/6/2023  Today's Date: 2024  Patient seen for 22 sessions         Andreina Segovia reports: shoulder is sore         Objective   See Exercise, Manual, and Modality Logs for complete treatment.       Assessment/Plan  Asked patient to think of any particular job areas that seem difficult or she has not been doing to work more specifically. Progress exercise to large composite motions today.   Progress per Plan of Care           Manual Therapy:         mins  10787;  Therapeutic Exercise:    32     mins  78212;     Neuromuscular Blas:    10    mins  00437;    Therapeutic Activity:     15     mins  88363;     Gait Training:           mins  05594;     Ultrasound:          mins  20083;    Electrical Stimulation:         mins  57598 ( );  Dry Needling          mins self-pay    Timed Treatment:   57   mins   Total Treatment:     57   mins    Beba Mcgrath PT  Physical Therapist

## 2024-02-02 ENCOUNTER — TREATMENT (OUTPATIENT)
Dept: PHYSICAL THERAPY | Facility: CLINIC | Age: 39
End: 2024-02-02
Payer: COMMERCIAL

## 2024-02-02 DIAGNOSIS — G56.21 ULNAR NERVE COMPRESSION AT MULTIPLE LEVELS, RIGHT: ICD-10-CM

## 2024-02-02 DIAGNOSIS — M25.511 RIGHT SHOULDER PAIN, UNSPECIFIED CHRONICITY: Primary | ICD-10-CM

## 2024-02-02 DIAGNOSIS — M75.111 INCOMPLETE TEAR OF RIGHT ROTATOR CUFF, UNSPECIFIED WHETHER TRAUMATIC: ICD-10-CM

## 2024-02-02 PROCEDURE — 97530 THERAPEUTIC ACTIVITIES: CPT | Performed by: PHYSICAL THERAPIST

## 2024-02-02 PROCEDURE — 97110 THERAPEUTIC EXERCISES: CPT | Performed by: PHYSICAL THERAPIST

## 2024-02-02 PROCEDURE — 97112 NEUROMUSCULAR REEDUCATION: CPT | Performed by: PHYSICAL THERAPIST

## 2024-02-02 NOTE — PROGRESS NOTES
Physical Therapy Daily Treatment Note         [address] [facility] [address]              [place] [address]    Patient: Andreina Segovia   : 1985  Diagnosis/ICD-10 Code:  Right shoulder pain, unspecified chronicity [M25.511]  Referring practitioner: Drake Granda MD  Date of Initial Visit: Type: THERAPY  Noted: 10/6/2023  Today's Date: 2024  Patient seen for 23 sessions         Andreina Segovia reports: shoulder doing well. Still does get sore when using it but pain is very low compared to beginning. Other than basic lifting, she is curous how chest compression would feel, this is something that was painful at the beginning.         Objective   See Exercise, Manual, and Modality Logs for complete treatment.       Assessment/Plan  Will start to think about HEP going forward. She is still lacking end range motion but function within available motion is improving. Able to complete 2 rounds of simulated CPR chest compressions without issue.   Progress per Plan of Care           Manual Therapy:         mins  80374;  Therapeutic Exercise:    32     mins  24108;     Neuromuscular Blas:    10    mins  74626;    Therapeutic Activity:     15     mins  54707;     Gait Training:           mins  09246;     Ultrasound:          mins  32549;    Electrical Stimulation:         mins  68330 ( );  Dry Needling         mins self-pay    Timed Treatment:   57   mins   Total Treatment:     57   mins    Beba Mcgrath PT  Physical Therapist

## 2024-02-15 ENCOUNTER — TREATMENT (OUTPATIENT)
Dept: PHYSICAL THERAPY | Facility: CLINIC | Age: 39
End: 2024-02-15
Payer: COMMERCIAL

## 2024-02-15 DIAGNOSIS — M75.111 INCOMPLETE TEAR OF RIGHT ROTATOR CUFF, UNSPECIFIED WHETHER TRAUMATIC: ICD-10-CM

## 2024-02-15 DIAGNOSIS — G56.21 ULNAR NERVE COMPRESSION AT MULTIPLE LEVELS, RIGHT: ICD-10-CM

## 2024-02-15 DIAGNOSIS — M25.511 RIGHT SHOULDER PAIN, UNSPECIFIED CHRONICITY: Primary | ICD-10-CM

## 2024-02-15 PROCEDURE — 97110 THERAPEUTIC EXERCISES: CPT | Performed by: PHYSICAL THERAPIST

## 2024-02-15 PROCEDURE — 97112 NEUROMUSCULAR REEDUCATION: CPT | Performed by: PHYSICAL THERAPIST

## 2024-02-15 PROCEDURE — 97530 THERAPEUTIC ACTIVITIES: CPT | Performed by: PHYSICAL THERAPIST

## 2024-02-15 NOTE — PROGRESS NOTES
Physical Therapy Daily Treatment Note         [address] [facility] [address]              [place] [address]    Patient: Andreina Segovia   : 1985  Diagnosis/ICD-10 Code:  Right shoulder pain, unspecified chronicity [M25.511]  Referring practitioner: Drake Granda MD  Date of Initial Visit: Type: THERAPY  Noted: 10/6/2023  Today's Date: 2/15/2024  Patient seen for 24 sessions         Andreina Segovia reports: doing pretty well. There is still some weakness depending on activity         Objective   See Exercise, Manual, and Modality Logs for complete treatment.       Assessment/Plan  Went over more extensive HEP. Will review as needed next visit and may continue on her own from there if no issues [email or web address]   Progress per Plan of Care    Access Code: [email or web address]  URL: [email or web address]  Date: 02/15/2024  Prepared by: Beba Mcgrath    Exercises  - Doorway Pec Stretch at 90 Degrees Abduction  - 1 x daily - 7 x weekly - 3 reps - 30 hold  - Standing Shoulder Internal Rotation Stretch with Towel  - 1 x daily - 7 x weekly - 3 reps - 30 hold  - Standing Single Arm Shoulder Flexion Stretch on Wall  - 1 x daily - 7 x weekly - 3 reps - 30 hold  - Doorway Pec Stretch at 120 Elevation with Arm Straight  - 1 x daily - 7 x weekly - 3 reps - 30 hold  - Shoulder extension with resistance - Neutral  - 3 x weekly - 2 sets - 15 reps  - Shoulder External Rotation and Scapular Retraction with Resistance  - 3 x weekly - 2 sets - 15 reps  - Shoulder Internal Rotation with Resistance  - 3 x weekly - 2 sets - 15 reps  - Shoulder Flexion Serratus Activation with Resistance  - 3 x weekly - 3 sets - 5 reps  - Standing Shoulder Flexion with Resistance  - 3 x weekly - 2 sets - 15 reps  - Standing Single Arm Shoulder Abduction with Resistance  - 3 x weekly - 2 sets - 15 reps  - Shoulder External Rotation in Abduction with Anchored Resistance  - 3 x weekly - 2 sets - 15 reps  - Shoulder Overhead Press in Flexion with Dumbbells   - 3 x weekly - 2 sets - 15 reps       Manual Therapy:         mins  21557;  Therapeutic Exercise:    32     mins  00303;     Neuromuscular Blas:    10    mins  83706;    Therapeutic Activity:     15     mins  73282;     Gait Training:           mins  20638;     Ultrasound:          mins  42626;    Electrical Stimulation:         mins  84289 ( );  Dry Needling          mins self-pay    Timed Treatment:   57   mins   Total Treatment:     57   mins    Beba Mcgrath PT  Physical Therapist

## 2024-02-29 ENCOUNTER — TREATMENT (OUTPATIENT)
Dept: PHYSICAL THERAPY | Facility: CLINIC | Age: 39
End: 2024-02-29
Payer: COMMERCIAL

## 2024-02-29 DIAGNOSIS — M25.511 RIGHT SHOULDER PAIN, UNSPECIFIED CHRONICITY: Primary | ICD-10-CM

## 2024-02-29 DIAGNOSIS — G56.21 ULNAR NERVE COMPRESSION AT MULTIPLE LEVELS, RIGHT: ICD-10-CM

## 2024-02-29 DIAGNOSIS — M75.111 INCOMPLETE TEAR OF RIGHT ROTATOR CUFF, UNSPECIFIED WHETHER TRAUMATIC: ICD-10-CM

## 2024-02-29 PROCEDURE — 97530 THERAPEUTIC ACTIVITIES: CPT | Performed by: PHYSICAL THERAPIST

## 2024-02-29 PROCEDURE — 97110 THERAPEUTIC EXERCISES: CPT | Performed by: PHYSICAL THERAPIST

## 2024-02-29 PROCEDURE — 97112 NEUROMUSCULAR REEDUCATION: CPT | Performed by: PHYSICAL THERAPIST

## 2024-02-29 NOTE — PROGRESS NOTES
Physical Therapy Daily Treatment Note         [address] [facility] [address]              [place] [address]    Patient: Andreina Segovia   : 1985  Diagnosis/ICD-10 Code:  Right shoulder pain, unspecified chronicity [M25.511]  Referring practitioner: Drake Granda MD  Date of Initial Visit: Type: THERAPY  Noted: 10/6/2023  Today's Date: 2024  Patient seen for 25 sessions         Andreina Segovia reports: no issues with HEP. Is a little extra sore from a patient pulling on her arm unexpectedly.         Objective   See Exercise, Manual, and Modality Logs for complete treatment.       Assessment/Plan  No issues with exercise today. Will hold therapy for next 2-3 weeks should issues arise.   Other           Manual Therapy:         mins  69846;  Therapeutic Exercise:    32     mins  63061;     Neuromuscular Blas:    10    mins  49287;    Therapeutic Activity:     15     mins  31066;     Gait Training:           mins  00359;     Ultrasound:          mins  03514;    Electrical Stimulation:         mins  89749 ( );  Dry Needling          mins self-pay    Timed Treatment:   57   mins   Total Treatment:     57   mins    Beba Mcgrath PT  Physical Therapist

## 2024-03-04 ENCOUNTER — OFFICE VISIT (OUTPATIENT)
Dept: GASTROENTEROLOGY | Facility: CLINIC | Age: 39
End: 2024-03-04
Payer: COMMERCIAL

## 2024-03-04 VITALS
HEART RATE: 98 BPM | OXYGEN SATURATION: 99 % | TEMPERATURE: 97.9 F | HEIGHT: 59 IN | DIASTOLIC BLOOD PRESSURE: 78 MMHG | SYSTOLIC BLOOD PRESSURE: 116 MMHG | BODY MASS INDEX: 46.57 KG/M2 | WEIGHT: 231 LBS

## 2024-03-04 DIAGNOSIS — K21.9 GASTROESOPHAGEAL REFLUX DISEASE, UNSPECIFIED WHETHER ESOPHAGITIS PRESENT: Primary | ICD-10-CM

## 2024-03-04 DIAGNOSIS — K44.9 HH (HIATUS HERNIA): ICD-10-CM

## 2024-03-04 PROCEDURE — 99214 OFFICE O/P EST MOD 30 MIN: CPT | Performed by: NURSE PRACTITIONER

## 2024-03-04 RX ORDER — PANTOPRAZOLE SODIUM 20 MG/1
20 TABLET, DELAYED RELEASE ORAL 2 TIMES DAILY
Qty: 180 TABLET | Refills: 3 | Status: SHIPPED | OUTPATIENT
Start: 2024-03-04

## 2024-03-04 NOTE — PROGRESS NOTES
Follow Up      Patient Name: Andreina Segovia  : 1985   MRN: [id number]     Chief Complaint:    Chief Complaint   Patient presents with    Follow-up       History of Present Illness: Andreina Segovia is a 38 y.o. female who is here today for follow up on GERD    Has been on ppi off and on for the past few years.   Takes ppi about 5 days out of the week as she sometimes forgets to take her pill.  She rarely has breakthrough heartburn.  In the past she did go off PPI for a time but developed dysphagia that resolved back on treatment.    Recently developed b12 def.       UPPER GI ENDOSCOPY (2022 14:36) (Dr Cool)-small hiatal hernia, LA grade a esophagitis- biopsies neg for ugalde's    UPPER GI ENDOSCOPY (2019 07:46) (Dr Cool)-2 cm hiatal hernia, LA grade B esophagitis, gastritis-biopsies negative for H. pylori, Ugalde's    Catrachita with IOP, diagnostic lap with salpingectomy   Subjective      Review of Systems:   Review of Systems   Constitutional:  Negative for appetite change and unexpected weight loss.   HENT:  Negative for trouble swallowing.    Gastrointestinal:  Negative for abdominal distention, abdominal pain, anal bleeding, blood in stool, constipation, diarrhea, nausea, rectal pain, vomiting, GERD and indigestion.       Medications:     Current Outpatient Medications:     busPIRone (BUSPAR) 15 MG tablet, Take 1 tablet by mouth 2 (Two) Times a Day., Disp: 60 tablet, Rfl: 2    Cholecalciferol (VITAMIN D) 50 MCG (2000 UT) tablet, Take 1 tablet by mouth Daily., Disp: , Rfl:     EPINEPHrine (EpiPen 2-Timothy) 0.3 MG/0.3ML solution auto-injector injection, Use as directed for acute allergic reaction, Disp: 2 each, Rfl: 0    frovatriptan (FROVA) 2.5 MG tablet, Take 1 tablet by mouth Daily As Needed for Migraine, Disp: 10 tablet, Rfl: 2    levocetirizine (XYZAL) 5 MG tablet, Take 1 tablet by mouth Daily., Disp: 30 tablet, Rfl: 11    lisdexamfetamine (Vyvanse) 30 MG  "capsule, Take 1 capsule by mouth Every Morning, Disp: 30 capsule, Rfl: 0    medroxyPROGESTERone (Provera) 10 MG tablet, Take one tablet nightly for 14 nights - do this once every 4 months if no period, Disp: 14 tablet, Rfl: 7    olopatadine (PATANOL) 0.1 % ophthalmic solution, Administer 1 drop to both eyes 2 (Two) Times a Day As Needed., Disp: 5 mL, Rfl: 6    fluticasone (FLONASE) 50 MCG/ACT nasal spray, 2 sprays into the nostril(s) as directed by provider Daily for 30 days., Disp: 16 g, Rfl: 0    pantoprazole (PROTONIX) 20 MG EC tablet, Take 1 tablet by mouth 2 (Two) Times a Day., Disp: 180 tablet, Rfl: 3    Allergies:   Allergies   Allergen Reactions    Naproxen Swelling    Nsaids Angioedema    Adhesive Tape Other (See Comments)     \"Takes my skin off\"; hives    Clindamycin/Lincomycin Other (See Comments)     Pill esophagitis       Social History:   Social History     Socioeconomic History    Marital status: Single   Tobacco Use    Smoking status: Never    Smokeless tobacco: Never   Vaping Use    Vaping status: Never Used   Substance and Sexual Activity    Alcohol use: Not Currently     Alcohol/week: 1.0 - 2.0 standard drink of alcohol    Drug use: No    Sexual activity: Not Currently     Birth control/protection: Bilateral salpingectomy         Surgical History:   Past Surgical History:   Procedure Laterality Date    ABDOMINAL SURGERY  1/4/17    Diagnostic laparoscopy; cholecystectomy 2020    CHOLECYSTECTOMY WITH INTRAOPERATIVE CHOLANGIOGRAM N/A 10/14/2020    Procedure: CHOLECYSTECTOMY LAPAROSCOPIC INTRAOPERATIVE CHOLANGIOGRAM;  Surgeon: Artie Esparza MD;  Location: [facility] beds at the [facility] OR;  Service: General;  Laterality: N/A;    COLONOSCOPY      DIAGNOSTIC LAPAROSCOPY N/A 01/04/2017    Procedure: DIAGNOSTIC LAPAROSCOPY, LAP BILATERAL SALPINGECTOMY;  Surgeon: Kaia Humphrey MD;  Location:  [facility] OR;  Service:     ENDOSCOPY N/A 03/21/2019    Procedure: EGD;  Surgeon: Wyatt Cool MD;  Location:  [facility] " "ENDOSCOPY;  Service: Gastroenterology    ENDOSCOPY N/A 03/01/2022    Procedure: ESOPHAGOGASTRODUODENOSCOPY;  Surgeon: Wyatt Cool MD;  Location: [facility] ENDOSCOPY;  Service: Gastroenterology;  Laterality: N/A;    SALPINGECTOMY      TUBAL ABDOMINAL LIGATION  1/4/17    With the diagnostic laparoscopy    UPPER GASTROINTESTINAL ENDOSCOPY  2022    WISDOM TOOTH EXTRACTION          Medical History:   Past Medical History:   Diagnosis Date    Anemia     On and off    Ankle sprain     Bronchitis     Dental crowns present     right side upper and lower - total of 3    Diabetes mellitus 2006    Gestational    GERD (gastroesophageal reflux disease) 2002    Hernia January 2017    Hiatal    Hypermobility syndrome     Infectious mononucleosis     Knee sprain     Migraines     Ovarian cyst     Subluxation of patella     Tendinitis of knee     Tennis elbow     Wears glasses         Objective     Physical Exam:  Vital Signs:   Vitals:    03/04/24 0810   BP: 116/78   BP Location: Right arm   Patient Position: Sitting   Cuff Size: Adult   Pulse: 98   Temp: 97.9 °F (36.6 °C)   TempSrc: Temporal   SpO2: 99%   Weight: 105 kg (231 lb)   Height: 149.9 cm (59\")     Body mass index is 46.66 kg/m².     Physical Exam  Constitutional:       General: She is not in acute distress.     Appearance: She is well-developed.   Pulmonary:      Effort: Pulmonary effort is normal. No accessory muscle usage or respiratory distress.   Abdominal:      General: Bowel sounds are normal. There is no distension.      Palpations: Abdomen is soft.      Tenderness: There is no abdominal tenderness.   Lymphadenopathy:      Cervical: No cervical adenopathy.   Skin:     Coloration: Skin is not pale.      Findings: No erythema.   Neurological:      Mental Status: She is alert and oriented to person, place, and time.   Psychiatric:         Speech: Speech normal.         Behavior: Behavior normal.         Thought Content: Thought content normal.         Judgment: " Judgment normal.         Assessment / Plan      Assessment/Plan:   Diagnoses and all orders for this visit:    1. Gastroesophageal reflux disease, unspecified whether esophagitis present (Primary)  -     pantoprazole (PROTONIX) 20 MG EC tablet; Take 1 tablet by mouth 2 (Two) Times a Day.  Dispense: 180 tablet; Refill: 3    2. HH (hiatus hernia)    Continue reflux precautions  Discussed long-term side effects of PPI.  Will try to reduce her pantoprazole to 20 mg with the option of taking this a second time a day.  If she does well she may try to further reduce her dose to every other day or wean completely off.    Follow Up:   Return in about 1 year (around 3/4/2025), or if symptoms worsen or fail to improve.    Plan of care reviewed with the patient at the conclusion of today's visit.  Education was provided regarding diagnosis, management, and any prescribed or recommended OTC medications.  Patient verbalized understanding of and agreement with management plan.       GISELLE Sprague  [facility] [place] [facility]

## 2024-05-09 ENCOUNTER — TRANSCRIBE ORDERS (OUTPATIENT)
Dept: LAB | Facility: HOSPITAL | Age: 39
End: 2024-05-09
Payer: COMMERCIAL

## 2024-05-09 ENCOUNTER — LAB (OUTPATIENT)
Dept: LAB | Facility: HOSPITAL | Age: 39
End: 2024-05-09
Payer: COMMERCIAL

## 2024-05-09 DIAGNOSIS — N92.6 IRREGULAR MENSTRUAL CYCLE: ICD-10-CM

## 2024-05-09 DIAGNOSIS — N92.6 IRREGULAR MENSTRUAL CYCLE: Primary | ICD-10-CM

## 2024-05-09 LAB
BASOPHILS # BLD AUTO: 0.06 10*3/MM3 (ref 0–0.2)
BASOPHILS NFR BLD AUTO: 0.8 % (ref 0–1.5)
DEPRECATED RDW RBC AUTO: 42.8 FL (ref 37–54)
EOSINOPHIL # BLD AUTO: 0.24 10*3/MM3 (ref 0–0.4)
EOSINOPHIL NFR BLD AUTO: 3 % (ref 0.3–6.2)
ERYTHROCYTE [DISTWIDTH] IN BLOOD BY AUTOMATED COUNT: 13.3 % (ref 12.3–15.4)
HCT VFR BLD AUTO: 39.1 % (ref 34–46.6)
HGB BLD-MCNC: 12.5 G/DL (ref 12–15.9)
IMM GRANULOCYTES # BLD AUTO: 0.02 10*3/MM3 (ref 0–0.05)
IMM GRANULOCYTES NFR BLD AUTO: 0.3 % (ref 0–0.5)
LYMPHOCYTES # BLD AUTO: 2.73 10*3/MM3 (ref 0.7–3.1)
LYMPHOCYTES NFR BLD AUTO: 34.1 % (ref 19.6–45.3)
MCH RBC QN AUTO: 28.2 PG (ref 26.6–33)
MCHC RBC AUTO-ENTMCNC: 32 G/DL (ref 31.5–35.7)
MCV RBC AUTO: 88.3 FL (ref 79–97)
MONOCYTES # BLD AUTO: 0.55 10*3/MM3 (ref 0.1–0.9)
MONOCYTES NFR BLD AUTO: 6.9 % (ref 5–12)
NEUTROPHILS NFR BLD AUTO: 4.4 10*3/MM3 (ref 1.7–7)
NEUTROPHILS NFR BLD AUTO: 54.9 % (ref 42.7–76)
NRBC BLD AUTO-RTO: 0 /100 WBC (ref 0–0.2)
PLATELET # BLD AUTO: 256 10*3/MM3 (ref 140–450)
PMV BLD AUTO: 10.6 FL (ref 6–12)
RBC # BLD AUTO: 4.43 10*6/MM3 (ref 3.77–5.28)
T4 FREE SERPL-MCNC: 0.91 NG/DL (ref 0.93–1.7)
TSH SERPL DL<=0.05 MIU/L-ACNC: 3.01 UIU/ML (ref 0.27–4.2)
WBC NRBC COR # BLD AUTO: 8 10*3/MM3 (ref 3.4–10.8)

## 2024-05-09 PROCEDURE — 85025 COMPLETE CBC W/AUTO DIFF WBC: CPT

## 2024-05-09 PROCEDURE — 84443 ASSAY THYROID STIM HORMONE: CPT

## 2024-05-09 PROCEDURE — 84439 ASSAY OF FREE THYROXINE: CPT

## 2024-05-09 PROCEDURE — 36415 COLL VENOUS BLD VENIPUNCTURE: CPT

## 2024-06-05 ENCOUNTER — LAB (OUTPATIENT)
Dept: LAB | Facility: HOSPITAL | Age: 39
End: 2024-06-05
Payer: COMMERCIAL

## 2024-06-05 ENCOUNTER — TRANSCRIBE ORDERS (OUTPATIENT)
Dept: LAB | Facility: HOSPITAL | Age: 39
End: 2024-06-05
Payer: COMMERCIAL

## 2024-06-05 DIAGNOSIS — F41.9 ANXIETY AND DEPRESSION: Primary | ICD-10-CM

## 2024-06-05 DIAGNOSIS — F32.A ANXIETY AND DEPRESSION: ICD-10-CM

## 2024-06-05 DIAGNOSIS — E53.8 LOW SERUM VITAMIN B12: ICD-10-CM

## 2024-06-05 DIAGNOSIS — F41.9 ANXIETY AND DEPRESSION: ICD-10-CM

## 2024-06-05 DIAGNOSIS — F32.A ANXIETY AND DEPRESSION: Primary | ICD-10-CM

## 2024-06-05 PROCEDURE — 36415 COLL VENOUS BLD VENIPUNCTURE: CPT

## 2024-06-05 PROCEDURE — 84439 ASSAY OF FREE THYROXINE: CPT

## 2024-06-05 PROCEDURE — 82607 VITAMIN B-12: CPT

## 2024-06-06 LAB
T4 FREE SERPL-MCNC: 0.91 NG/DL (ref 0.92–1.68)
VIT B12 BLD-MCNC: 602 PG/ML (ref 211–946)

## 2024-10-08 DIAGNOSIS — K21.9 GASTROESOPHAGEAL REFLUX DISEASE, UNSPECIFIED WHETHER ESOPHAGITIS PRESENT: ICD-10-CM

## 2024-10-15 ENCOUNTER — LAB (OUTPATIENT)
Dept: LAB | Facility: HOSPITAL | Age: 39
End: 2024-10-15
Payer: COMMERCIAL

## 2024-10-15 ENCOUNTER — TRANSCRIBE ORDERS (OUTPATIENT)
Dept: LAB | Facility: HOSPITAL | Age: 39
End: 2024-10-15
Payer: COMMERCIAL

## 2024-10-15 DIAGNOSIS — N92.6 IRREGULAR MENSTRUAL CYCLE: Primary | ICD-10-CM

## 2024-10-15 DIAGNOSIS — N92.6 IRREGULAR MENSTRUAL CYCLE: ICD-10-CM

## 2024-10-15 LAB
DEPRECATED RDW RBC AUTO: 44.4 FL (ref 37–54)
ERYTHROCYTE [DISTWIDTH] IN BLOOD BY AUTOMATED COUNT: 13.6 % (ref 12.3–15.4)
HCT VFR BLD AUTO: 41.4 % (ref 34–46.6)
HGB BLD-MCNC: 13.6 G/DL (ref 12–15.9)
MCH RBC QN AUTO: 29.3 PG (ref 26.6–33)
MCHC RBC AUTO-ENTMCNC: 32.9 G/DL (ref 31.5–35.7)
MCV RBC AUTO: 89.2 FL (ref 79–97)
PLATELET # BLD AUTO: 291 10*3/MM3 (ref 140–450)
PMV BLD AUTO: 11.3 FL (ref 6–12)
RBC # BLD AUTO: 4.64 10*6/MM3 (ref 3.77–5.28)
WBC NRBC COR # BLD AUTO: 8.95 10*3/MM3 (ref 3.4–10.8)

## 2024-10-15 PROCEDURE — 36415 COLL VENOUS BLD VENIPUNCTURE: CPT

## 2024-10-15 PROCEDURE — 84443 ASSAY THYROID STIM HORMONE: CPT

## 2024-10-15 PROCEDURE — 85027 COMPLETE CBC AUTOMATED: CPT

## 2024-10-15 PROCEDURE — 82607 VITAMIN B-12: CPT

## 2024-10-15 PROCEDURE — 84439 ASSAY OF FREE THYROXINE: CPT

## 2024-10-16 LAB
T4 FREE SERPL-MCNC: 1.12 NG/DL (ref 0.92–1.68)
TSH SERPL DL<=0.05 MIU/L-ACNC: 2.95 UIU/ML (ref 0.27–4.2)
VIT B12 BLD-MCNC: 657 PG/ML (ref 211–946)

## 2024-12-27 RX ORDER — ESOMEPRAZOLE MAGNESIUM 40 MG/1
40 CAPSULE, DELAYED RELEASE ORAL DAILY
Qty: 30 CAPSULE | Refills: 5 | Status: SHIPPED | OUTPATIENT
Start: 2024-12-27

## 2024-12-31 ENCOUNTER — TRANSCRIBE ORDERS (OUTPATIENT)
Dept: LAB | Facility: HOSPITAL | Age: 39
End: 2024-12-31
Payer: COMMERCIAL

## 2024-12-31 ENCOUNTER — LAB (OUTPATIENT)
Dept: LAB | Facility: HOSPITAL | Age: 39
End: 2024-12-31
Payer: COMMERCIAL

## 2024-12-31 DIAGNOSIS — N39.3 FEMALE STRESS INCONTINENCE: Primary | ICD-10-CM

## 2024-12-31 DIAGNOSIS — N39.3 STRESS INCONTINENCE: ICD-10-CM

## 2024-12-31 LAB
ALBUMIN SERPL-MCNC: 3.9 G/DL (ref 3.5–5.2)
ALBUMIN/GLOB SERPL: 1.1 G/DL
ALP SERPL-CCNC: 106 U/L (ref 39–117)
ALT SERPL W P-5'-P-CCNC: 28 U/L (ref 1–33)
ANION GAP SERPL CALCULATED.3IONS-SCNC: 9.5 MMOL/L (ref 5–15)
AST SERPL-CCNC: 27 U/L (ref 1–32)
BILIRUB SERPL-MCNC: <0.2 MG/DL (ref 0–1.2)
BUN SERPL-MCNC: 13 MG/DL (ref 6–20)
BUN/CREAT SERPL: 16.9 (ref 7–25)
CALCIUM SPEC-SCNC: 9.1 MG/DL (ref 8.6–10.5)
CHLORIDE SERPL-SCNC: 104 MMOL/L (ref 98–107)
CO2 SERPL-SCNC: 23.5 MMOL/L (ref 22–29)
CREAT SERPL-MCNC: 0.77 MG/DL (ref 0.57–1)
EGFRCR SERPLBLD CKD-EPI 2021: 99.5 ML/MIN/1.73
GLOBULIN UR ELPH-MCNC: 3.6 GM/DL
GLUCOSE SERPL-MCNC: 105 MG/DL (ref 65–99)
HIV 1+2 AB+HIV1 P24 AG SERPL QL IA: NORMAL
POTASSIUM SERPL-SCNC: 3.8 MMOL/L (ref 3.5–5.2)
PROT SERPL-MCNC: 7.5 G/DL (ref 6–8.5)
SODIUM SERPL-SCNC: 137 MMOL/L (ref 136–145)
TREPONEMA PALLIDUM IGG+IGM AB [PRESENCE] IN SERUM OR PLASMA BY IMMUNOASSAY: NORMAL

## 2024-12-31 PROCEDURE — 80053 COMPREHEN METABOLIC PANEL: CPT

## 2024-12-31 PROCEDURE — G0432 EIA HIV-1/HIV-2 SCREEN: HCPCS

## 2024-12-31 PROCEDURE — 36415 COLL VENOUS BLD VENIPUNCTURE: CPT

## 2024-12-31 PROCEDURE — 86780 TREPONEMA PALLIDUM: CPT

## 2025-01-09 ENCOUNTER — OFFICE VISIT (OUTPATIENT)
Dept: UROLOGY | Facility: CLINIC | Age: 40
End: 2025-01-09
Payer: COMMERCIAL

## 2025-01-09 DIAGNOSIS — R31.9 HEMATURIA, UNSPECIFIED TYPE: Primary | ICD-10-CM

## 2025-01-09 LAB
BILIRUB BLD-MCNC: NEGATIVE MG/DL
CLARITY, POC: CLEAR
COLOR UR: YELLOW
EXPIRATION DATE: NORMAL
GLUCOSE UR STRIP-MCNC: NEGATIVE MG/DL
KETONES UR QL: NEGATIVE
LEUKOCYTE EST, POC: NEGATIVE
Lab: NORMAL
NITRITE UR-MCNC: NEGATIVE MG/ML
PH UR: 6.5 [PH] (ref 5–8)
PROT UR STRIP-MCNC: NEGATIVE MG/DL
RBC # UR STRIP: NEGATIVE /UL
SP GR UR: 1.01 (ref 1–1.03)
UROBILINOGEN UR QL: NORMAL

## 2025-01-09 NOTE — PROGRESS NOTES
Gross Hematuria Female Office Visit      Patient Name: Andreina Segovia  : 1985   MRN: [id number]     Chief Complaint:  Gross Hematuria.   Chief Complaint   Patient presents with    Gross hematuria       Referring Provider: Deepti Ly MD    History of Present Illness: Ms. Segovia is a 39 y.o. female with history of gross hematuria.  She reports she has never seen blood in the toilet but will have staining on a pad.  Her last Gyn appointment was in  and she underwent endometrial biopsy which was negative. This has been going on intermittently since September.  She goes through 1 or less pads per day.    She has not had a menstrual cycle and has been perimenopausal.  No dysuria.  Has had only 1 UTI in the past.     Prior occupational exposures include: None    Smoking history: Never    Family history of  malignancy: father with bladder cancer         Subjective      Review of System:   Review of Systems   I have reviewed the ROS documented by my clinical staff,  I have updated appropriately and I agree. Sury Vazquez MD    Past Medical History:  Past Medical History:   Diagnosis Date    Abnormal Pap smear of cervix     Anemia     On and off    Ankle sprain     Bronchitis     Dental crowns present     right side upper and lower - total of 3    Diabetes mellitus 2006    Gestational    GERD (gastroesophageal reflux disease)     Hernia 2017    Hiatal    Hypermobility syndrome     Infectious mononucleosis     Knee sprain     Migraines     Ovarian cyst     STD (sexually transmitted disease) 2017    Subluxation of patella     Tendinitis of knee     Tennis elbow     Urinary incontinence     Wears glasses        Past Surgical History:  Past Surgical History:   Procedure Laterality Date    ABDOMINAL SURGERY  17    Diagnostic laparoscopy; cholecystectomy     CHOLECYSTECTOMY WITH INTRAOPERATIVE CHOLANGIOGRAM N/A 10/14/2020    Procedure: CHOLECYSTECTOMY LAPAROSCOPIC  Discussed both issues over the phone.  Recommended statin due to CT coronary calcium score of 284.  Patient agrees, side effects, not limited to myalgias and hyperglycemia was discussed.    As far as pain and paresthesias of the foot, recommended seeing podiatry.  She has an appointment next month   INTRAOPERATIVE CHOLANGIOGRAM;  Surgeon: Artie Esparza MD;  Location:  [facility];  Service: General;  Laterality: N/A;    COLONOSCOPY      DIAGNOSTIC LAPAROSCOPY N/A 01/04/2017    Procedure: DIAGNOSTIC LAPAROSCOPY, LAP BILATERAL SALPINGECTOMY;  Surgeon: Kaia Humphrey MD;  Location:  [facility];  Service:     ENDOSCOPY N/A 03/21/2019    Procedure: EGD;  Surgeon: Wyatt Cool MD;  Location:  [facility];  Service: Gastroenterology    ENDOSCOPY N/A 03/01/2022    Procedure: ESOPHAGOGASTRODUODENOSCOPY;  Surgeon: Wyatt Cool MD;  Location:  [facility];  Service: Gastroenterology;  Laterality: N/A;    SALPINGECTOMY      TUBAL ABDOMINAL LIGATION  1/4/17    With the diagnostic laparoscopy    UPPER GASTROINTESTINAL ENDOSCOPY  2022    WISDOM TOOTH EXTRACTION         Medications:    Current Outpatient Medications:     busPIRone (BUSPAR) 15 MG tablet, Take 1 tablet by mouth 2 (Two) Times a Day., Disp: 60 tablet, Rfl: 2    busPIRone (BUSPAR) 15 MG tablet, Take 1 tablet by mouth 2 (Two) Times a Day., Disp: 60 tablet, Rfl: 2    busPIRone (BUSPAR) 15 MG tablet, Take 1 tablet by mouth 2 (Two) Times a Day., Disp: 60 tablet, Rfl: 2    Cholecalciferol (VITAMIN D) 50 MCG (2000 UT) tablet, Take 1 tablet by mouth Daily., Disp: , Rfl:     EPINEPHrine (EpiPen 2-Timothy) 0.3 MG/0.3ML solution auto-injector injection, Use as directed for acute allergic reaction, Disp: 2 each, Rfl: 0    EPINEPHrine (EpiPen 2-Timothy) 0.3 MG/0.3ML solution auto-injector injection, Use as directed for acute allergic reaction., Disp: 2 each, Rfl: 0    EPINEPHrine (EpiPen 2-Timothy) 0.3 MG/0.3ML solution auto-injector injection, Inject 0.3 mL into the appropriate muscle as directed by provider as needed for acute allergic reaction, Disp: 2 each, Rfl: 1    esomeprazole (nexIUM) 40 MG capsule, Take 1 capsule by mouth Daily., Disp: 30 capsule, Rfl: 5    frovatriptan (FROVA) 2.5 MG tablet, Take 1 tablet by mouth Daily As Needed for migraine.,  "Disp: 30 tablet, Rfl: 0    levocetirizine (XYZAL) 5 MG tablet, Take 1 tablet by mouth Daily., Disp: 30 tablet, Rfl: 11    levocetirizine (XYZAL) 5 MG tablet, Take 1 tablet by mouth Daily., Disp: 30 tablet, Rfl: 11    medroxyPROGESTERone (Provera) 10 MG tablet, Take one tablet nightly for 14 nights - do this once every 4 months if no period, Disp: 14 tablet, Rfl: 7    methylphenidate (Concerta) 18 MG CR tablet, Take 1 tablet by mouth Every Morning, Disp: 30 tablet, Rfl: 0    olopatadine (PATANOL) 0.1 % ophthalmic solution, Administer 1 drop to both eyes 2 (Two) Times a Day As Needed., Disp: 5 mL, Rfl: 6    fluticasone (FLONASE) 50 MCG/ACT nasal spray, 2 sprays into the nostril(s) as directed by provider Daily for 30 days., Disp: 16 g, Rfl: 0    lisdexamfetamine (Vyvanse) 30 MG capsule, Take 1 capsule by mouth Every Morning., Disp: 30 capsule, Rfl: 0    pantoprazole (PROTONIX) 20 MG EC tablet, Take 1 tablet by mouth 2 (Two) Times a Day., Disp: 180 tablet, Rfl: 3    Allergies:  Allergies   Allergen Reactions    Naproxen Swelling    Nsaids Angioedema    Adhesive Tape Other (See Comments)     \"Takes my skin off\"; hives    Clindamycin/Lincomycin Other (See Comments)     Pill esophagitis       Social History:  Social History     Socioeconomic History    Marital status: Single   Tobacco Use    Smoking status: Never    Smokeless tobacco: Never   Vaping Use    Vaping status: Never Used   Substance and Sexual Activity    Alcohol use: Not Currently     Alcohol/week: 1.0 - 2.0 standard drink of alcohol    Drug use: Never    Sexual activity: Not Currently     Partners: Female, Male     Birth control/protection: Bilateral salpingectomy        Family History:  Family History   Problem Relation Age of Onset    Ulcerative colitis Mother     Hypertension Mother     Colon polyps Mother     Glaucoma Father     Osteoarthritis Father     Cancer Father         Bladder    Hypertension Father     Cancer Other     Diabetes Other     Heart " attack Other     Stroke Other     Diabetes Paternal Grandmother     Celiac disease Brother     Alcohol abuse Brother     Alcohol abuse Brother        Bladder & Bowel Symptom Questionnaire    How often do you usually urinate during the day ?   1 - About every 3-4 hours   2.   How many timed do you urinate at night?   0 - 0-1 time at night   3.   What is the reason that you usually urinate?   1 - Mild urge (can delay over an hour)   4.   Once you get the urge to go, how long can you     comfortably delay?   1 - 30-60 min   5.   How often do you get a sudden urge that makes you rush to the bathroom?   0 - Never   6.   How often does a sudden urge to urinate result in you leaking urine or wetting pads?   0 - Never   7.  In your opinion, how good is your bladder control?   1 - Very Good   8.  Do you have accidental bowel leakage?   no   9.  Do you have difficulty fully emptying your bladder?   no   10.  Do you experience accidental leakage when performing some physical activity such as coughing, sneezing, laughing or exercise?   yes   11. Have you tried medications to help improve your symptoms?   no   12. Would you be interested in learning about a long-lasting option that may help you with your symptoms?   no                                                                             Total Score   4     0-7 (Mild) 8-16 (Moderate) 17-28 (Severe)       Objective     Physical Exam:   Vital Signs: There were no vitals filed for this visit.  There is no height or weight on file to calculate BMI.     Physical Exam  Vitals and nursing note reviewed. Exam conducted with a chaperone present.   Constitutional:       General: She is awake. She is not in acute distress.     Appearance: Normal appearance.   HENT:      Head: Normocephalic and atraumatic.      Right Ear: External ear normal.      Left Ear: External ear normal.      Nose: Nose normal.   Eyes:      Conjunctiva/sclera: Conjunctivae normal.   Pulmonary:      Effort:  Pulmonary effort is normal. No respiratory distress.   Abdominal:      General: Abdomen is flat. There is no distension.      Palpations: Abdomen is soft. There is no mass.      Tenderness: There is no abdominal tenderness. There is no right CVA tenderness, left CVA tenderness, guarding or rebound.      Hernia: No hernia is present.   Genitourinary:     Exam position: Lithotomy position.   Skin:     General: Skin is warm.   Neurological:      General: No focal deficit present.      Mental Status: She is alert and oriented to person, place, and time.      Gait: Gait normal.   Psychiatric:         Behavior: Behavior normal. Behavior is cooperative.         Thought Content: Thought content normal.         Judgment: Judgment normal.         Labs:   Brief Urine Lab Results  (Last result in the past 365 days)        Color   Clarity   Blood   Leuk Est   Nitrite   Protein   CREAT   Urine HCG        01/09/25 0920 Yellow   Clear   Negative   Negative   Negative   Negative                        Lab Results   Component Value Date    GLUCOSE 105 (H) 12/31/2024    CALCIUM 9.1 12/31/2024     12/31/2024    K 3.8 12/31/2024    CO2 23.5 12/31/2024     12/31/2024    BUN 13 12/31/2024    CREATININE 0.77 12/31/2024    EGFRIFNONA 64 12/02/2021    BCR 16.9 12/31/2024    ANIONGAP 9.5 12/31/2024       Lab Results   Component Value Date    WBC 8.95 10/15/2024    HGB 13.6 10/15/2024    HCT 41.4 10/15/2024    MCV 89.2 10/15/2024     10/15/2024       Images:   No Images in the past 120 days found..    Measures:   Tobacco:   Andreina Segovia  reports that she has never smoked. She has never used smokeless tobacco.       Urine Incontinence: Patient reports that she is not currently experiencing any symptoms of urinary incontinence.     Assessment / Plan      Assessment/Plan:   Ms. Segovia is a 39 y.o. female who presented today for evaluation of gross hematuria.  She has had intermittent spotting on her pad.  Her  "urine has been dark.        The patient was counseled regarding the possible etiologies, relevant work-up and diagnostic approach for gross hematuria, as well as the relevant risk categories as assigned by the American [employer] Association guidelines.  I discussed that the definition of microscopic hematuria includes a microscopic urinalysis (not dipstick UA) positive for greater than 3 RBCs per high-powered field under microscopy.  We also discussed that any history of gross hematuria (or visible hematuria) places a patient at higher risk for occult urologic malignancies and necessitates prompt workup.  We discussed the aforementioned risk categories as assigned by the AUA, which are depicted below.  Ultimately, work-up is mandatory for gross or visible hematuria, as indicated by the \"HIGH RISK\" category and recommendations as depicted by the AUA Guidelines.  Given the patient's age, no smoking history, gross hematuria; the patient is deemed HIGH risk, and for these reasons I recommend proceeding with a flexible diagnostic cystoscopy and CT urogram to assess the collecting system of the kidney and bladder.            Diagnoses and all orders for this visit:    1. Hematuria, unspecified type (Primary)  -     POC Urinalysis Dipstick, Automated  -     CT Abdomen Pelvis With & Without Contrast; Future        Follow Up:   Return in about 2 weeks (around 1/23/2025) for Recheck.        Sury Vazquez MD  [facility]   "

## 2025-01-22 ENCOUNTER — HOSPITAL ENCOUNTER (OUTPATIENT)
Dept: CT IMAGING | Facility: HOSPITAL | Age: 40
Discharge: HOME | End: 2025-01-22
Admitting: UROLOGY
Payer: COMMERCIAL

## 2025-01-22 DIAGNOSIS — R31.9 HEMATURIA, UNSPECIFIED TYPE: ICD-10-CM

## 2025-01-22 PROCEDURE — 74178 CT ABD&PLV WO CNTR FLWD CNTR: CPT

## 2025-01-22 PROCEDURE — 25510000001 IOPAMIDOL PER 1 ML: Performed by: UROLOGY

## 2025-01-22 RX ORDER — IOPAMIDOL 755 MG/ML
150 INJECTION, SOLUTION INTRAVASCULAR
Status: COMPLETED | OUTPATIENT
Start: 2025-01-22 | End: 2025-01-22

## 2025-01-22 RX ADMIN — IOPAMIDOL 150 ML: 755 INJECTION, SOLUTION INTRAVENOUS at 11:07

## 2025-01-27 RX ORDER — ESOMEPRAZOLE MAGNESIUM 40 MG/1
40 CAPSULE, DELAYED RELEASE ORAL DAILY
Qty: 30 CAPSULE | Refills: 5 | Status: SHIPPED | OUTPATIENT
Start: 2025-01-27

## 2025-01-28 ENCOUNTER — PROCEDURE VISIT (OUTPATIENT)
Age: 40
End: 2025-01-28
Payer: COMMERCIAL

## 2025-01-28 DIAGNOSIS — R31.9 HEMATURIA, UNSPECIFIED TYPE: Primary | ICD-10-CM

## 2025-01-28 LAB
BILIRUB BLD-MCNC: NEGATIVE MG/DL
CLARITY, POC: ABNORMAL
COLOR UR: YELLOW
EXPIRATION DATE: ABNORMAL
GLUCOSE UR STRIP-MCNC: NEGATIVE MG/DL
KETONES UR QL: NEGATIVE
LEUKOCYTE EST, POC: ABNORMAL
Lab: ABNORMAL
NITRITE UR-MCNC: NEGATIVE MG/ML
PH UR: 6 [PH] (ref 5–8)
PROT UR STRIP-MCNC: NEGATIVE MG/DL
RBC # UR STRIP: NEGATIVE /UL
SP GR UR: 1.03 (ref 1–1.03)
UROBILINOGEN UR QL: NORMAL

## 2025-01-28 NOTE — PROGRESS NOTES
Preprocedure diagnosis  Hematuria    Postprocedure diagnosis  Same    Procedure  Flexible Cystourethroscopy    Attending surgeon  Sury Vazquez MD    Anesthesia  2% lidocaine jelly intraurethrally    Complications  None    Indications  39 y.o. female undergoing a flexible cystoscopy for the above mentioned indications.     Informed consent was obtained prior to the procedure start.       Findings  Cystoscopy revealed normal bladder mucosa with NO tumors, masses, stones or trabeculations noted.      Procedure  The patient was placed in supine position and prepped and draped in sterile fashion with lidocaine jelly instilled 5 minutes pre-procedure start.  A brief timeout including available nursing staff and awake patient was performed.  The 16 Fr digital flexible cystoscope was lubricated and gently placed into the urethral meatus. The proximal and distal portions of the urethra appeared well vascularized and normal in appearance. The bladder neck was visualized and appeared well vascularized without mucosal lesions or abnormal appearance. The bladder was then entered and  completely visualized including the trigone. There were bilateral orthotopic ureteral orifices which appeared patent and effluxed clear yellow urine. The posterior wall, lateral walls, anterior wall, and dome were visualized. The cystoscope was then retroflexed and the bladder neck was further visualized and appeared normal.  The scope was gently withdrawn and the procedure terminated.  The patient tolerated the procedure well.       I reviewed her CT scan which did not reveal any abnormalities.  Her cystoscopy was totally normal.  She has undergone a negative hematuria workup.  It is unlikely her spotting was urologic in origin.  I will see her back in 1 year

## 2025-03-09 RX ORDER — ESOMEPRAZOLE MAGNESIUM 40 MG/1
40 CAPSULE, DELAYED RELEASE ORAL DAILY
Qty: 90 CAPSULE | Refills: 3 | Status: SHIPPED | OUTPATIENT
Start: 2025-03-09 | End: 2026-03-09

## 2025-03-18 ENCOUNTER — OFFICE VISIT (OUTPATIENT)
Dept: ORTHOPEDIC SURGERY | Facility: CLINIC | Age: 40
End: 2025-03-18
Payer: OTHER MISCELLANEOUS

## 2025-03-18 VITALS
HEIGHT: 59 IN | WEIGHT: 237 LBS | SYSTOLIC BLOOD PRESSURE: 130 MMHG | DIASTOLIC BLOOD PRESSURE: 90 MMHG | BODY MASS INDEX: 47.78 KG/M2

## 2025-03-18 DIAGNOSIS — E66.813 CLASS 3 SEVERE OBESITY WITHOUT SERIOUS COMORBIDITY WITH BODY MASS INDEX (BMI) OF 45.0 TO 49.9 IN ADULT, UNSPECIFIED OBESITY TYPE: ICD-10-CM

## 2025-03-18 DIAGNOSIS — S80.01XA CONTUSION OF RIGHT KNEE, INITIAL ENCOUNTER: ICD-10-CM

## 2025-03-18 DIAGNOSIS — M25.561 RIGHT KNEE PAIN, UNSPECIFIED CHRONICITY: Primary | ICD-10-CM
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
## 2025-03-18 DIAGNOSIS — M17.11 PRIMARY OSTEOARTHRITIS OF RIGHT KNEE: ICD-10-CM

## 2025-03-18 DIAGNOSIS — E66.01 CLASS 3 SEVERE OBESITY WITHOUT SERIOUS COMORBIDITY WITH BODY MASS INDEX (BMI) OF 45.0 TO 49.9 IN ADULT, UNSPECIFIED OBESITY TYPE: ICD-10-CM

## 2025-03-18 PROCEDURE — 99213 OFFICE O/P EST LOW 20 MIN: CPT | Performed by: PHYSICIAN ASSISTANT

## 2025-03-18 NOTE — PROGRESS NOTES
[facility] Note        Subjective     CC: Initial Evaluation of the Right Knee  DOI: 1/19/2025    HPI    Andreina Segovia is a 39 y.o. female.  Established patient presents for evaluation of right knee pain.  VENKATESH: Patient reports that on the above-stated date she fell landing on the anterior aspect of her knee.  She presents today for evaluation due to continued pain despite participating in formal PT.    Pain scale: 3-4/10.  Severity of the pain moderate.  Quality of the pain burning, throbbing.  Associated symptoms pain, stiffness.  Activity related to pain walking, standing movement of knee.  Pain eased by resting.  No reported numbness or tingling.  Prior treatments Tylenol, PT.    No reported mechanical symptoms, such as locking or catching.    Overall, patient's symptoms are as above.    ROS:    Constiutional:Pt denies fever, chills, nausea, or vomiting.  MSK:as above        Objective      Past Medical History  Past Medical History:   Diagnosis Date   • Abnormal Pap smear of cervix 2014   • Anemia     On and off   • Ankle sprain    • Bronchitis    • Dental crowns present     right side upper and lower - total of 3   • Diabetes mellitus 2006    Gestational   • GERD (gastroesophageal reflux disease) 2002   • Hernia January 2017    Hiatal   • Hypermobility syndrome    • Infectious mononucleosis    • Knee sprain    • Migraines    • Ovarian cyst    • STD (sexually transmitted disease) 2017   • Subluxation of patella    • Tendinitis of knee    • Tennis elbow    • Urinary incontinence    • Wears glasses      Social History     Socioeconomic History   • Marital status: Single   Tobacco Use   • Smoking status: Never   • Smokeless tobacco: Never   Vaping Use   • Vaping status: Never Used   Substance and Sexual Activity   • Alcohol use: Not Currently     Alcohol/week: 1.0 - 2.0 standard drink of alcohol   • Drug use: Never   • Sexual activity: Not Currently     Partners: Female, Male      "Birth control/protection: Bilateral salpingectomy           Physical Exam  /90   Ht 149.9 cm (59.02\")   Wt 108 kg (237 lb)   BMI 47.84 kg/m²     Body mass index is 47.84 kg/m².    Patient is well nourished and well developed.        Ortho Exam  Right knee  Alignment: Slight genu varum  Skin: Intact without any erythema, warmth, swelling.  Motion: -10 to 120°  Tenderness: Positive tenderness directly over anterior aspect of the knee.  No medial or lateral joint line tenderness.  Instability: Lachman negative.  Varus and valgus stress test negative.   Meniscus: Pam's negative.  Patella: Compression negative.   Straight leg raise: Intact.  Motor: Grossly intact Q/HS/TA/GS/EHL/P  Sensory: Grossly intact DP/SP/S/S/T nerve distributions.       Imaging/Labs/EMG Reviewed and Interpreted:  Ordered right knee plain films.  Imaging read/interpreted by Dr. Arellano.    Indication: Right knee pain     Comparison: No prior xrays are available for comparison     IMPRESSION:      Right Knee: moderate tricompartmental arthritis with genu varum alignment, periarticular osteophytes visualized in all compartments.  No acute bony injury or fracture.      Assessment:  1. Right knee pain, unspecified chronicity    2. Contusion of right knee, initial encounter    3. Primary osteoarthritis of right knee    4. Accidental fall, initial encounter    5. Class 3 severe obesity without serious comorbidity with body mass index (BMI) of 45.0 to 49.9 in adult, unspecified obesity type        Plan:  Right knee pain secondary to contusion from a fall in the setting of patient with moderate osteoarthritis.  Reviewed imaging with the patient.  Continue PT.  Recommend OTC NSAIDS/pain medication as needed.  Obesity--patient has a BMI of 47.8.  The patient has been instructed on various weight loss avenues including diet, portion control, calorie restriction, low/no impact exercise.  It was explained that weight loss can improve joint pain " alone by decreasing the joint reaction forces.  For every pound of weight change, the knee and hip joints see a 4 to 5 fold change in pressure.    Follow up as needed.  Questions and concerns answered.      Katie Chinchilla PA-C  03/18/25  20:35 EDT      Dictated Utilizing Dragon Dictation.

## 2025-04-07 ENCOUNTER — LAB (OUTPATIENT)
Dept: LAB | Facility: HOSPITAL | Age: 40
End: 2025-04-07
Payer: COMMERCIAL

## 2025-04-07 ENCOUNTER — TRANSCRIBE ORDERS (OUTPATIENT)
Dept: LAB | Facility: HOSPITAL | Age: 40
End: 2025-04-07
Payer: COMMERCIAL

## 2025-04-07 DIAGNOSIS — R16.0 HEPATOMEGALY: Primary | ICD-10-CM

## 2025-04-07 DIAGNOSIS — R16.0 HEPATOMEGALY: ICD-10-CM

## 2025-04-07 LAB
ALBUMIN SERPL-MCNC: 4 G/DL (ref 3.5–5.2)
ALBUMIN/GLOB SERPL: 1.1 G/DL
ALP SERPL-CCNC: 107 U/L (ref 39–117)
ALT SERPL W P-5'-P-CCNC: 21 U/L (ref 1–33)
ANION GAP SERPL CALCULATED.3IONS-SCNC: 13.6 MMOL/L (ref 5–15)
APTT PPP: 30.2 SECONDS (ref 22–39)
AST SERPL-CCNC: 22 U/L (ref 1–32)
BASOPHILS # BLD AUTO: 0.04 10*3/MM3 (ref 0–0.2)
BASOPHILS NFR BLD AUTO: 0.4 % (ref 0–1.5)
BILIRUB SERPL-MCNC: 0.3 MG/DL (ref 0–1.2)
BUN SERPL-MCNC: 14 MG/DL (ref 6–20)
BUN/CREAT SERPL: 15.4 (ref 7–25)
CALCIUM SPEC-SCNC: 9.7 MG/DL (ref 8.6–10.5)
CHLORIDE SERPL-SCNC: 105 MMOL/L (ref 98–107)
CO2 SERPL-SCNC: 22.4 MMOL/L (ref 22–29)
CREAT SERPL-MCNC: 0.91 MG/DL (ref 0.57–1)
DEPRECATED RDW RBC AUTO: 41.1 FL (ref 37–54)
EGFRCR SERPLBLD CKD-EPI 2021: 81.4 ML/MIN/1.73
EOSINOPHIL # BLD AUTO: 0.27 10*3/MM3 (ref 0–0.4)
EOSINOPHIL NFR BLD AUTO: 2.8 % (ref 0.3–6.2)
ERYTHROCYTE [DISTWIDTH] IN BLOOD BY AUTOMATED COUNT: 13.2 % (ref 12.3–15.4)
GLOBULIN UR ELPH-MCNC: 3.5 GM/DL
GLUCOSE SERPL-MCNC: 101 MG/DL (ref 65–99)
HBA1C MFR BLD: 6 % (ref 4.8–5.6)
HCT VFR BLD AUTO: 38.6 % (ref 34–46.6)
HGB BLD-MCNC: 12.8 G/DL (ref 12–15.9)
IMM GRANULOCYTES # BLD AUTO: 0.05 10*3/MM3 (ref 0–0.05)
IMM GRANULOCYTES NFR BLD AUTO: 0.5 % (ref 0–0.5)
INR PPP: 0.92 (ref 0.89–1.12)
LYMPHOCYTES # BLD AUTO: 2.35 10*3/MM3 (ref 0.7–3.1)
LYMPHOCYTES NFR BLD AUTO: 24.6 % (ref 19.6–45.3)
MCH RBC QN AUTO: 28.5 PG (ref 26.6–33)
MCHC RBC AUTO-ENTMCNC: 33.2 G/DL (ref 31.5–35.7)
MCV RBC AUTO: 86 FL (ref 79–97)
MONOCYTES # BLD AUTO: 0.53 10*3/MM3 (ref 0.1–0.9)
MONOCYTES NFR BLD AUTO: 5.6 % (ref 5–12)
NEUTROPHILS NFR BLD AUTO: 6.3 10*3/MM3 (ref 1.7–7)
NEUTROPHILS NFR BLD AUTO: 66.1 % (ref 42.7–76)
NRBC BLD AUTO-RTO: 0 /100 WBC (ref 0–0.2)
PLATELET # BLD AUTO: 292 10*3/MM3 (ref 140–450)
PMV BLD AUTO: 11.1 FL (ref 6–12)
POTASSIUM SERPL-SCNC: 4.1 MMOL/L (ref 3.5–5.2)
PROT SERPL-MCNC: 7.5 G/DL (ref 6–8.5)
PROTHROMBIN TIME: 12.9 SECONDS (ref 12.2–15.3)
RBC # BLD AUTO: 4.49 10*6/MM3 (ref 3.77–5.28)
SODIUM SERPL-SCNC: 141 MMOL/L (ref 136–145)
WBC NRBC COR # BLD AUTO: 9.54 10*3/MM3 (ref 3.4–10.8)

## 2025-04-07 PROCEDURE — 85730 THROMBOPLASTIN TIME PARTIAL: CPT

## 2025-04-07 PROCEDURE — 85610 PROTHROMBIN TIME: CPT

## 2025-04-07 PROCEDURE — 80053 COMPREHEN METABOLIC PANEL: CPT | Performed by: INTERNAL MEDICINE

## 2025-04-07 PROCEDURE — 85025 COMPLETE CBC W/AUTO DIFF WBC: CPT

## 2025-04-07 PROCEDURE — 83036 HEMOGLOBIN GLYCOSYLATED A1C: CPT | Performed by: INTERNAL MEDICINE

## 2025-04-07 PROCEDURE — 36415 COLL VENOUS BLD VENIPUNCTURE: CPT

## 2025-04-23 NOTE — PROGRESS NOTES
Follow Up      Patient Name: Andreina Segovia  : 1985   MRN: [id number]     Chief Complaint:  No chief complaint on file.  F/u GERD.    History of Present Illness: Andreina Segovia is a 39 y.o. female who is here today for follow up on GERD.    Andreina notes she is feeling much better. Has been on Nexium daily and has seen a marked improvement in her symptoms. No breakthrough symptoms. Denies dysphagia and odynophagia. No weight loss. Does have some intermittent diarrhea with varying stool habits. Does not use any medications for this problem.     Andreina does note that she had a recent CT scan with findings concerning for fatty liver and hepatomegaly. Only drinks socially. No prior hx of liver disease.     Past medical, surgical, social, and family histories are reviewed for accuracy.  No documented alleviating or exacerbating factors.  Does not endorse pain at time of exam.    Subjective      Review of Systems:   Review of Systems   Constitutional: Negative.    HENT: Negative.     Eyes: Negative.    Respiratory: Negative.     Cardiovascular: Negative.    Gastrointestinal: Negative.    Endocrine: Negative.    Genitourinary: Negative.    Musculoskeletal: Negative.    Skin: Negative.    Allergic/Immunologic: Negative.    Neurological: Negative.    Hematological: Negative.    Psychiatric/Behavioral: Negative.     All other systems reviewed and are negative.      Medications:     Current Outpatient Medications:     busPIRone (BUSPAR) 15 MG tablet, Take 1 tablet by mouth 2 (Two) Times a Day., Disp: 60 tablet, Rfl: 2    busPIRone (BUSPAR) 15 MG tablet, Take 1 tablet by mouth 2 (Two) Times a Day., Disp: 60 tablet, Rfl: 2    busPIRone (BUSPAR) 15 MG tablet, Take 1 tablet by mouth 2 (Two) Times a Day., Disp: 60 tablet, Rfl: 2    busPIRone (BUSPAR) 15 MG tablet, Take 1 tablet by mouth 2 (Two) Times a Day., Disp: 60 tablet, Rfl: 2    Cholecalciferol (VITAMIN D) 50 MCG (2000) tablet, Take 1 tablet  "by mouth Daily., Disp: , Rfl:     EPINEPHrine (EpiPen 2-Timothy) 0.3 MG/0.3ML solution auto-injector injection, Use as directed for acute allergic reaction, Disp: 2 each, Rfl: 0    EPINEPHrine (EpiPen 2-Timothy) 0.3 MG/0.3ML solution auto-injector injection, Use as directed for acute allergic reaction., Disp: 2 each, Rfl: 0    EPINEPHrine (EpiPen 2-Timothy) 0.3 MG/0.3ML solution auto-injector injection, Inject 0.3 mL into the appropriate muscle as directed by provider as needed for acute allergic reaction, Disp: 2 each, Rfl: 1    esomeprazole (nexIUM) 40 MG capsule, Take 1 capsule by mouth Daily., Disp: 90 capsule, Rfl: 3    fluticasone (FLONASE) 50 MCG/ACT nasal spray, 2 sprays into the nostril(s) as directed by provider Daily for 30 days., Disp: 16 g, Rfl: 0    fluticasone (FLONASE) 50 MCG/ACT nasal spray, Administer 2 sprays into each nostril as directed by provider Daily As Needed., Disp: 16 g, Rfl: 11    frovatriptan (FROVA) 2.5 MG tablet, Take 1 tablet by mouth Daily As Needed for migraine., Disp: 30 tablet, Rfl: 0    levocetirizine (XYZAL) 5 MG tablet, Take 1 tablet by mouth Daily., Disp: 30 tablet, Rfl: 11    levocetirizine (XYZAL) 5 MG tablet, Take 1 tablet by mouth Daily., Disp: 30 tablet, Rfl: 11    methylphenidate (Concerta) 18 MG CR tablet, Take 1 tablet by mouth Every Morning, Disp: 30 tablet, Rfl: 0    Allergies:   Allergies   Allergen Reactions    Naproxen Swelling    Nsaids Angioedema    Adhesive Tape Other (See Comments)     \"Takes my skin off\"; hives    Clindamycin/Lincomycin Other (See Comments)     Pill esophagitis       Social History:   Social History     Socioeconomic History    Marital status: Single   Tobacco Use    Smoking status: Never    Smokeless tobacco: Never   Vaping Use    Vaping status: Never Used   Substance and Sexual Activity    Alcohol use: Not Currently     Alcohol/week: 1.0 - 2.0 standard drink of alcohol    Drug use: Never    Sexual activity: Not Currently     Partners: Female, Male     " Birth control/protection: Bilateral salpingectomy         Surgical History:   Past Surgical History:   Procedure Laterality Date    ABDOMINAL SURGERY  1/4/17    Diagnostic laparoscopy; cholecystectomy 2020    CHOLECYSTECTOMY WITH INTRAOPERATIVE CHOLANGIOGRAM N/A 10/14/2020    Procedure: CHOLECYSTECTOMY LAPAROSCOPIC INTRAOPERATIVE CHOLANGIOGRAM;  Surgeon: Artie Esparza MD;  Location:  [facility];  Service: General;  Laterality: N/A;    COLONOSCOPY      DIAGNOSTIC LAPAROSCOPY N/A 01/04/2017    Procedure: DIAGNOSTIC LAPAROSCOPY, LAP BILATERAL SALPINGECTOMY;  Surgeon: Kaia Humphrey MD;  Location:  [facility];  Service:     ENDOSCOPY N/A 03/21/2019    Procedure: EGD;  Surgeon: Wyatt Cool MD;  Location:  [facility];  Service: Gastroenterology    ENDOSCOPY N/A 03/01/2022    Procedure: ESOPHAGOGASTRODUODENOSCOPY;  Surgeon: Wyatt Cool MD;  Location:  [facility];  Service: Gastroenterology;  Laterality: N/A;    SALPINGECTOMY      TUBAL ABDOMINAL LIGATION  1/4/17    With the diagnostic laparoscopy    UPPER GASTROINTESTINAL ENDOSCOPY  2022    WISDOM TOOTH EXTRACTION          Medical History:   Past Medical History:   Diagnosis Date    Abnormal Pap smear of cervix 2014    Anemia     On and off    Ankle sprain     Bronchitis     Dental crowns present     right side upper and lower - total of 3    Diabetes mellitus 2006    Gestational    GERD (gastroesophageal reflux disease) 2002    Hernia January 2017    Hiatal    Hypermobility syndrome     Infectious mononucleosis     Knee sprain     Migraines     Ovarian cyst     STD (sexually transmitted disease) 2017    Subluxation of patella     Tendinitis of knee     Tennis elbow     Urinary incontinence     Wears glasses         Objective     Physical Exam:  Vital Signs: There were no vitals filed for this visit.  Vitals:    04/24/25 1315   BP: 130/88   Pulse: 81   Temp: 97.7 °F (36.5 °C)   SpO2: 98%     Physical Exam  Vitals and nursing note  reviewed.   Constitutional:       General: She is not in acute distress.     Appearance: Normal appearance. She is normal weight. She is not ill-appearing or toxic-appearing.   HENT:      Head: Normocephalic and atraumatic.   Eyes:      General: No scleral icterus.     Extraocular Movements: Extraocular movements intact.      Conjunctiva/sclera: Conjunctivae normal.      Pupils: Pupils are equal, round, and reactive to light.   Cardiovascular:      Rate and Rhythm: Normal rate and regular rhythm.      Pulses: Normal pulses.      Heart sounds: Normal heart sounds.   Pulmonary:      Effort: Pulmonary effort is normal. No respiratory distress.      Breath sounds: Normal breath sounds.   Abdominal:      General: Abdomen is flat. Bowel sounds are normal. There is no distension.      Palpations: Abdomen is soft. There is no mass.      Tenderness: There is no abdominal tenderness. There is no guarding or rebound.      Hernia: No hernia is present.   Skin:     General: Skin is warm and dry.      Coloration: Skin is not jaundiced or pale.   Neurological:      General: No focal deficit present.      Mental Status: She is alert and oriented to person, place, and time.   Psychiatric:         Mood and Affect: Mood normal.         Behavior: Behavior normal.         Thought Content: Thought content normal.         Judgment: Judgment normal.     Assessment / Plan      Assessment/Plan:   GERD, esophagitis presence not specified   Hiatal Hernia   Abnormal CT scan; hepatomegaly and hepatic steatosis     Andreina [place] female who presents to clinic for follow-up regarding GERD.  Has been taking PPI as prescribed- notes significantly improved symptoms. CT scan with hepatomegaly and hepatic steatosis noted that was obtained when she was dealing with hematuria. Discussed fatty liver and plans going forward with patient.     >> Continue PPI therapy; has refills.   >> Continue GERD dietary and lifestyle modifications  >> For  hepatic steatosis   Recommend 10% body mass reduction   3-5 cups of  black coffee daily   Daily MVI  Recommend exercises as per the American Heart Association recommendations for 150 minutes of exercise weekly.   >> Obtain BONNER fibrosure.  If findings concerning for significant BONNER, will monitor dietary and lifestyle changes; if repeat in a few months is still concerning, low threshold for Rezdiffra.      Follow Up:   Return to clinic in 1 year    Plan of care reviewed with the patient at the conclusion of today's visit.  Education was provided regarding diagnosis, management, and any prescribed or recommended OTC medications.  Patient verbalized understanding of and agreement with management plan.     Time Statement:   Discussed plan of care in detail with patient today. Patient verbally understands and agrees. I have spent 30 minutes reviewing available diagnostics, obtaining history, examining the patient, developing a treatment plan, and educating the patient on disease process and plan of care.     Francis Cervantes, FRANSICO, APRN, AGACNP-BC.  [facility]  04/24/2025  14:34 EDT

## 2025-04-24 ENCOUNTER — OFFICE VISIT (OUTPATIENT)
Dept: GASTROENTEROLOGY | Facility: CLINIC | Age: 40
End: 2025-04-24
Payer: COMMERCIAL

## 2025-04-24 ENCOUNTER — LAB (OUTPATIENT)
Dept: LAB | Facility: HOSPITAL | Age: 40
End: 2025-04-24
Payer: COMMERCIAL

## 2025-04-24 VITALS
SYSTOLIC BLOOD PRESSURE: 130 MMHG | OXYGEN SATURATION: 98 % | HEART RATE: 81 BPM | BODY MASS INDEX: 48.99 KG/M2 | DIASTOLIC BLOOD PRESSURE: 88 MMHG | TEMPERATURE: 97.7 F | HEIGHT: 59 IN | WEIGHT: 243 LBS

## 2025-04-24 DIAGNOSIS — K21.9 GASTROESOPHAGEAL REFLUX DISEASE, UNSPECIFIED WHETHER ESOPHAGITIS PRESENT: ICD-10-CM

## 2025-04-24 DIAGNOSIS — K75.81 NASH (NONALCOHOLIC STEATOHEPATITIS): Primary | ICD-10-CM

## 2025-04-24 RX ORDER — CYANOCOBALAMIN 1000 UG/ML
1000 INJECTION, SOLUTION INTRAMUSCULAR; SUBCUTANEOUS
COMMUNITY
Start: 2025-04-17 | End: 2026-04-25

## 2025-04-25 ENCOUNTER — LAB (OUTPATIENT)
Dept: LAB | Facility: HOSPITAL | Age: 40
End: 2025-04-25
Payer: COMMERCIAL

## 2025-04-25 PROCEDURE — 36415 COLL VENOUS BLD VENIPUNCTURE: CPT | Performed by: NURSE PRACTITIONER

## 2025-04-25 PROCEDURE — 84478 ASSAY OF TRIGLYCERIDES: CPT | Performed by: NURSE PRACTITIONER

## 2025-04-25 PROCEDURE — 84450 TRANSFERASE (AST) (SGOT): CPT | Performed by: NURSE PRACTITIONER

## 2025-04-25 PROCEDURE — 84460 ALANINE AMINO (ALT) (SGPT): CPT | Performed by: NURSE PRACTITIONER

## 2025-04-25 PROCEDURE — 82465 ASSAY BLD/SERUM CHOLESTEROL: CPT | Performed by: NURSE PRACTITIONER

## 2025-04-25 PROCEDURE — 82247 BILIRUBIN TOTAL: CPT | Performed by: NURSE PRACTITIONER

## 2025-04-25 PROCEDURE — 82172 ASSAY OF APOLIPOPROTEIN: CPT | Performed by: NURSE PRACTITIONER

## 2025-04-25 PROCEDURE — 82977 ASSAY OF GGT: CPT | Performed by: NURSE PRACTITIONER

## 2025-04-25 PROCEDURE — 83883 ASSAY NEPHELOMETRY NOT SPEC: CPT | Performed by: NURSE PRACTITIONER

## 2025-04-25 PROCEDURE — 82947 ASSAY GLUCOSE BLOOD QUANT: CPT | Performed by: NURSE PRACTITIONER

## 2025-04-25 PROCEDURE — 83010 ASSAY OF HAPTOGLOBIN QUANT: CPT | Performed by: NURSE PRACTITIONER

## 2025-05-07 DIAGNOSIS — E78.00 ELEVATED CHOLESTEROL: Primary | ICD-10-CM

## 2025-05-08 ENCOUNTER — LAB (OUTPATIENT)
Dept: LAB | Facility: HOSPITAL | Age: 40
End: 2025-05-08
Payer: COMMERCIAL

## 2025-05-08 ENCOUNTER — RESULTS FOLLOW-UP (OUTPATIENT)
Dept: GASTROENTEROLOGY | Facility: CLINIC | Age: 40
End: 2025-05-08
Payer: COMMERCIAL

## 2025-05-08 LAB
CHOLEST SERPL-MCNC: 153 MG/DL (ref 0–200)
HDLC SERPL-MCNC: 50 MG/DL (ref 40–60)
LDLC SERPL CALC-MCNC: 88 MG/DL (ref 0–100)
LDLC/HDLC SERPL: 1.76 {RATIO}
TRIGL SERPL-MCNC: 75 MG/DL (ref 0–150)
VLDLC SERPL-MCNC: 15 MG/DL (ref 5–40)

## 2025-05-08 PROCEDURE — 36415 COLL VENOUS BLD VENIPUNCTURE: CPT | Performed by: NURSE PRACTITIONER

## 2025-05-08 PROCEDURE — 80061 LIPID PANEL: CPT | Performed by: NURSE PRACTITIONER

## 2025-06-27 DIAGNOSIS — K75.81 NONALCOHOLIC STEATOHEPATITIS: Primary | ICD-10-CM

## 2025-06-30 ENCOUNTER — LAB (OUTPATIENT)
Dept: LAB | Facility: HOSPITAL | Age: 40
End: 2025-06-30
Payer: COMMERCIAL

## 2025-06-30 PROCEDURE — 83010 ASSAY OF HAPTOGLOBIN QUANT: CPT | Performed by: NURSE PRACTITIONER

## 2025-06-30 PROCEDURE — 84460 ALANINE AMINO (ALT) (SGPT): CPT | Performed by: NURSE PRACTITIONER

## 2025-06-30 PROCEDURE — 82947 ASSAY GLUCOSE BLOOD QUANT: CPT | Performed by: NURSE PRACTITIONER

## 2025-06-30 PROCEDURE — 84450 TRANSFERASE (AST) (SGOT): CPT | Performed by: NURSE PRACTITIONER

## 2025-06-30 PROCEDURE — 83883 ASSAY NEPHELOMETRY NOT SPEC: CPT | Performed by: NURSE PRACTITIONER

## 2025-06-30 PROCEDURE — 82465 ASSAY BLD/SERUM CHOLESTEROL: CPT | Performed by: NURSE PRACTITIONER

## 2025-06-30 PROCEDURE — 82172 ASSAY OF APOLIPOPROTEIN: CPT | Performed by: NURSE PRACTITIONER

## 2025-06-30 PROCEDURE — 84478 ASSAY OF TRIGLYCERIDES: CPT | Performed by: NURSE PRACTITIONER

## 2025-06-30 PROCEDURE — 82247 BILIRUBIN TOTAL: CPT | Performed by: NURSE PRACTITIONER

## 2025-06-30 PROCEDURE — 82977 ASSAY OF GGT: CPT | Performed by: NURSE PRACTITIONER

## 2025-07-03 LAB
A2 MACROGLOB SERPL-MCNC: 129 MG/DL (ref 110–276)
ALT SERPL W P-5'-P-CCNC: 24 IU/L (ref 0–40)
APO A-I SERPL-MCNC: 130 MG/DL (ref 116–209)
AST SERPL W P-5'-P-CCNC: 34 IU/L (ref 0–40)
BILIRUB SERPL-MCNC: 0.1 MG/DL (ref 0–1.2)
CHOLEST SERPL-MCNC: 155 MG/DL (ref 100–199)
FIBROSIS SCORING:: ABNORMAL
FIBROSIS STAGE SERPL QL: ABNORMAL
GGT SERPL-CCNC: 22 IU/L (ref 0–60)
GLUCOSE SERPL-MCNC: 110 MG/DL (ref 70–99)
HAPTOGLOB SERPL-MCNC: 261 MG/DL (ref 42–296)
LABORATORY COMMENT REPORT: ABNORMAL
LIVER FIBR SCORE SERPL CALC.FIBROSURE: 0.01 (ref 0–0.21)
LIVER STEATOSIS GRADE SERPL QL: ABNORMAL
LIVER STEATOSIS SCORE SERPL: 0.48 (ref 0–0.4)
NASH GRADE SERPL QL: ABNORMAL
NASH INTERPRETATION SERPL-IMP: ABNORMAL
NASH SCORE SERPL: 0.22 (ref 0–0.25)
NASH SCORING: ABNORMAL
STEATOSIS SCORING: ABNORMAL
TEST PERFORMANCE INFO SPEC: ABNORMAL
TEST PERFORMANCE INFO SPEC: ABNORMAL
TRIGL SERPL-MCNC: 89 MG/DL (ref 0–149)

## 2025-07-05 ENCOUNTER — RESULTS FOLLOW-UP (OUTPATIENT)
Dept: GASTROENTEROLOGY | Facility: CLINIC | Age: 40
End: 2025-07-05
Payer: COMMERCIAL

## 2025-07-05 NOTE — LETTER
Andreina Segovia  [address] [place] [address] [place] [address]    July 7, 2025     Dear Ms. Segovia:    Below are the results from your recent visit:    Resulted Orders   BONNER Fibrosure   Result Value Ref Range    Fibrosis Score 0.01 0.00 - 0.21    Fibrosis Stage Comment       Comment:                         F0 - No fibrosis    Steatosis Score (Reference) 0.48 (H) 0.00 - 0.40    Steatosis Grade (Reference) Comment       Comment:                      S1 - Mild Steatosis                       (But Clinically Significant) (5-33%)    BONNER Score (Reference) 0.22 0.00 - 0.25    Bonner Grade (Reference) Comment       Comment:                         N0 - No BONNER    Methodology: Comment       Comment:      The analytes tested are performed by FibroSure-Specific methods.  Not intended for use with other diagnostic considerations.    Alpha 2-Macroglobulins, Qn 129 110 - 276 mg/dL    Haptoglobin 261 42 - 296 mg/dL    Apolipoprotein A-1 130 116 - 209 mg/dL    Total Bilirubin 0.1 0.0 - 1.2 mg/dL    GGT 22 0 - 60 IU/L    ALT (SGPT) 24 0 - 40 IU/L    AST (SGOT) P5P (Reference) 34 0 - 40 IU/L    Cholesterol, Total (Reference) 155 100 - 199 mg/dL    Glucose, Serum (Reference) 110 (H) 70 - 99 mg/dL    Triglycerides 89 0 - 149 mg/dL    Interpretations: (Reference) Comment       Comment:      Quantitative results of 10 biochemicals in combination with age and  gender, are analyzed using a computational algorithm to provide a  quantitative surrogate marker (0.0-1.0) of liver fibrosis (Metavir  F0-F4), hepatic steatosis (0.0-1.0, S0-S3), and Non-Alcoholic  Steato-Hepatitis (BONNER) (0.0-1.0, N0-N3), now known as Metabolic  Dysfunction-Associated Steatohepatitis (MASH). The absence of  steatosis (S<0.40) precludes the diagnosis of BONNER/MASH.  Fibrosis marker: In a study of 171 Non-Alcoholic Fatty Liver Disease  (NAFLD), now known as Metabolic Dysfunction-Associated Steatotic  Liver Disease (MASLD), patients where 23% had significant  NAFLD/MASLD  fibrosis (Metavir F2-F4) and 11% had cirrhosis by liver biopsy, a  fibrosis result of >0.3 yielded a sensitivity of 83% and a  specificity of 78% for the detection of significant fibrosis.[1]  Steatosis marker: In a population of 2997 patients, where 61% had  significant steatosis (>=5%) on a liver biopsy, a steatosis score  >0.4 had a  sensitivity of 79% and a specificity of 50% for  identification of significant steatosis.[2]  BONNER/MASH marker: In a population of 1081 NAFLD/MASLD patients, where  51% had at least some BONNER/MASH by liver biopsy, a prediction of  BONNER/MASH had a sensitivity of 72% for identifying BONNER/MASH and a  specificity of 71%.[3]    Fibrosis Scoring: Comment       Comment:           <=0.21 = Stage F0 - No fibrosis  0.21 - 0.27 = Stage F0 - F1  0.27 - 0.31 = Stage F1 - Portal fibrosis  0.31 - 0.48 = Stage F1 - F2  0.48 - 0.58 = Stage F2 - Bridging fibrosis with few septa  0.58 - 0.72 = Stage F3 - Bridging fibrosis with many septa  0.72 - 0.74 = Stage F3 - F4        >0.74 = Stage F4 - Cirrhosis    Steatosis Scoring Comment       Comment:           <=0.40 = S0  - No Steatosis (<5%)  0.40 - 0.55 = S1  - Mild Steatosis                      (but Clinically Significant) (5-33%)        >0.55 = S2S3- Moderate to Severe Steatosis                      (Clinically Significant) (%)    BONNER Scoring Comment       Comment:           <=0.25 = N0 - No BONNER/MASH  0.25 - 0.50 = N1 - Mild BONNER/MASH  0.50 - 0.75 = N2 - Moderate BONNER/MASH        >0.75 = N3 - Severe BONNER/MASH    Limitations: Comment       Comment:      BONNER FibroSure(R) Plus is recommended for patients with suspected  non-alcoholic fatty liver disease, now known as Metabolic  Dysfunction-Associated Steatotic Liver Disease or MASLD.  It is not recommended for patients with other liver diseases.  It is also not recommended in patients with Gilbert Disease,  acute hemolysis, acute viral hepatitis, drug induced hepatitis,  genetic  liver disease, autoimmune hepatitis and/or extra-hepatic  cholestasis. Any of these clinical situations may lead to  inaccurate quantitative predictions of fibrosis.    Comment Comment       Comment:      This test was developed and its performance characteristics  determined by [employer]. It has not been cleared or approved  by the Food and Drug Administration.  For questions regarding this report please contact customer service  at [phone].  References:  1.  Amita EDMONDSON et al. Diagnostic Value of Biochemical Markers  (FibroTest) for the prediction of Liver Fibrosis in patients with  Non-Alcoholic Fatty Liver Disease. BMC Gastroenterology 2006; 6:6.  2.  Giacomo KENNY. et al. The Diagnostic Performance of a Simplified  Blood Test (SteatoTest-2) for the Prediction of Liver Steatosis.  Eur J Gastroenterol Hepatol. 2019; 31:393-402.  3.  Giacomo KENNY. et al. Diagnostic performance of a new noninvasive  test for nonalcoholic steatohepatitis using a simplified histological  reference. Eur J Gastroenterol Hepatol. 2018 May; 30:569-577.       FibroSure showed F0/S1/N0.  This means there was no evidence of fibrosis nor was there evidence of BONNER though there is evidence of steatosis (fatty liver).  We will continue dietary and lifestyle modifications for fatty liver disease and recheck this lab in a year.  If evidence of worsening fatty liver, she would be a good candidate for Rezdiffra.  Please have her call me with any questions or concerns.  Thank you.  BONNER Fibrosure    If you have any questions or concerns, please don't hesitate to call.         Sincerely,        GISELLE Giron

## 2025-08-12 ENCOUNTER — OFFICE VISIT (OUTPATIENT)
Dept: SLEEP MEDICINE | Facility: CLINIC | Age: 40
End: 2025-08-12
Payer: COMMERCIAL

## 2025-08-12 VITALS
HEART RATE: 107 BPM | DIASTOLIC BLOOD PRESSURE: 70 MMHG | SYSTOLIC BLOOD PRESSURE: 122 MMHG | TEMPERATURE: 97.5 F | HEIGHT: 59 IN | BODY MASS INDEX: 49.59 KG/M2 | OXYGEN SATURATION: 95 % | WEIGHT: 246 LBS

## 2025-08-12 DIAGNOSIS — G47.19 EXCESSIVE DAYTIME SLEEPINESS: ICD-10-CM

## 2025-08-12 DIAGNOSIS — R51.9 MORNING HEADACHE: ICD-10-CM

## 2025-08-12 DIAGNOSIS — R41.840 LACK OF CONCENTRATION: ICD-10-CM

## 2025-08-12 DIAGNOSIS — R06.83 SNORING: ICD-10-CM

## 2025-08-12 DIAGNOSIS — E66.01 MORBID OBESITY WITH BMI OF 45.0-49.9, ADULT: Primary | ICD-10-CM

## 2025-08-12 PROCEDURE — 99213 OFFICE O/P EST LOW 20 MIN: CPT | Performed by: NURSE PRACTITIONER

## 2025-08-20 ENCOUNTER — HOSPITAL ENCOUNTER (OUTPATIENT)
Dept: SLEEP MEDICINE | Facility: HOSPITAL | Age: 40
Discharge: HOME | End: 2025-08-20
Admitting: NURSE PRACTITIONER
Payer: COMMERCIAL

## 2025-08-20 VITALS — HEIGHT: 59 IN | WEIGHT: 246.91 LBS | BODY MASS INDEX: 49.78 KG/M2

## 2025-08-20 DIAGNOSIS — G47.19 EXCESSIVE DAYTIME SLEEPINESS: ICD-10-CM

## 2025-08-20 DIAGNOSIS — R06.83 SNORING: ICD-10-CM

## 2025-08-20 DIAGNOSIS — R51.9 MORNING HEADACHE: ICD-10-CM

## 2025-08-20 DIAGNOSIS — R41.840 LACK OF CONCENTRATION: ICD-10-CM

## 2025-08-20 DIAGNOSIS — E66.01 MORBID OBESITY WITH BMI OF 45.0-49.9, ADULT: ICD-10-CM

## 2025-08-20 PROCEDURE — 95800 SLP STDY UNATTENDED: CPT

## 2025-08-22 ENCOUNTER — LAB (OUTPATIENT)
Dept: LAB | Facility: HOSPITAL | Age: 40
End: 2025-08-22
Payer: COMMERCIAL

## 2025-08-22 ENCOUNTER — TRANSCRIBE ORDERS (OUTPATIENT)
Dept: LAB | Facility: HOSPITAL | Age: 40
End: 2025-08-22
Payer: COMMERCIAL

## 2025-08-22 DIAGNOSIS — F90.0 ADHD, PREDOMINANTLY INATTENTIVE TYPE: Primary | ICD-10-CM

## 2025-08-29 ENCOUNTER — TELEPHONE (OUTPATIENT)
Dept: SLEEP MEDICINE | Facility: CLINIC | Age: 40
End: 2025-08-29
Payer: COMMERCIAL

## 2025-08-29 DIAGNOSIS — G47.33 OSA (OBSTRUCTIVE SLEEP APNEA): Primary | ICD-10-CM

## (undated) DEVICE — THE BITE BLOCK MAXI, LATEX FREE STRAP IS USED TO PROTECT THE ENDOSCOPE INSERTION TUBE FROM BEING BITTEN BY THE PATIENT.

## (undated) DEVICE — GOWN,PREVENTION PLUS,XXLARGE,STERILE: Brand: MEDLINE

## (undated) DEVICE — GLV SURG SENSICARE PI MIC PF SZ7 LF STRL

## (undated) DEVICE — CONTN GRAD MEAS TRIANG 32OZ BLK

## (undated) DEVICE — TUBING, SUCTION, 1/4" X 10', STRAIGHT: Brand: MEDLINE

## (undated) DEVICE — ENDOPOUCH RETRIEVER SPECIMEN RETRIEVAL BAGS: Brand: ENDOPOUCH RETRIEVER

## (undated) DEVICE — Device: Brand: DEFENDO AIR/WATER/SUCTION AND BIOPSY VALVE

## (undated) DEVICE — KT ORCA ORCAPOD DISP STRL

## (undated) DEVICE — ENDOGATOR HYBRID TUBING KIT FOR USE WITH ENDOGATOR IRRIGATION PUMP, OLYMPUS PUMP, GI4000 ESU, AND TORRENT IRRIGATION PUMP.: Brand: ENDOGATOR KIT

## (undated) DEVICE — SUT SILK 2/0 TIES 18IN A185H

## (undated) DEVICE — Device

## (undated) DEVICE — SYR LUERLOK 30CC

## (undated) DEVICE — SYR LUERLOK 20CC BX/50

## (undated) DEVICE — VISUALIZATION SYSTEM: Brand: CLEARIFY

## (undated) DEVICE — SUT VIC 0 UR6 27IN VCP603H

## (undated) DEVICE — SINGLE-USE BIOPSY FORCEPS: Brand: RADIAL JAW 4

## (undated) DEVICE — ENDOPATH XCEL BLADELESS TROCARS WITH STABILITY SLEEVES: Brand: ENDOPATH XCEL

## (undated) DEVICE — SYR LUERLOK 50ML

## (undated) DEVICE — ST EXT IV TBG W SECUR LK 20IN

## (undated) DEVICE — PK LAP LASR CHOLE 10

## (undated) DEVICE — ENDOCUT SCISSOR TIP, DISPOSABLE: Brand: RENEW

## (undated) DEVICE — ENDOPATH XCEL UNIVERSAL TROCAR STABLILITY SLEEVES: Brand: ENDOPATH XCEL

## (undated) DEVICE — INTRO ACCSR BLNT TP

## (undated) DEVICE — LUBE GEL ENDOGLIDE 1.1OZ

## (undated) DEVICE — GLV SURG SENSICARE PI MIC PF SZ7.5 LF STRL

## (undated) DEVICE — SOL IRR H2O BTL 1000ML STRL

## (undated) DEVICE — SNAP KOVER: Brand: UNBRANDED

## (undated) DEVICE — CVR HNDL LIGHT RIGID

## (undated) DEVICE — ANTIBACTERIAL UNDYED BRAIDED (POLYGLACTIN 910), SYNTHETIC ABSORBABLE SUTURE: Brand: COATED VICRYL

## (undated) DEVICE — LUER-LOK 360°: Brand: CONNECTA, LUER-LOK

## (undated) DEVICE — [HIGH FLOW INSUFFLATOR,  DO NOT USE IF PACKAGE IS DAMAGED,  KEEP DRY,  KEEP AWAY FROM SUNLIGHT,  PROTECT FROM HEAT AND RADIOACTIVE SOURCES.]: Brand: PNEUMOSURE